# Patient Record
Sex: MALE | Race: WHITE | NOT HISPANIC OR LATINO
[De-identification: names, ages, dates, MRNs, and addresses within clinical notes are randomized per-mention and may not be internally consistent; named-entity substitution may affect disease eponyms.]

---

## 2017-01-10 ENCOUNTER — APPOINTMENT (OUTPATIENT)
Dept: PULMONOLOGY | Facility: CLINIC | Age: 52
End: 2017-01-10

## 2017-01-10 VITALS
WEIGHT: 185 LBS | BODY MASS INDEX: 28.13 KG/M2 | DIASTOLIC BLOOD PRESSURE: 80 MMHG | OXYGEN SATURATION: 100 % | HEART RATE: 77 BPM | SYSTOLIC BLOOD PRESSURE: 110 MMHG

## 2017-05-25 ENCOUNTER — APPOINTMENT (OUTPATIENT)
Dept: PULMONOLOGY | Facility: CLINIC | Age: 52
End: 2017-05-25
Payer: COMMERCIAL

## 2017-05-25 VITALS
RESPIRATION RATE: 17 BRPM | WEIGHT: 184 LBS | DIASTOLIC BLOOD PRESSURE: 80 MMHG | SYSTOLIC BLOOD PRESSURE: 110 MMHG | HEIGHT: 68 IN | OXYGEN SATURATION: 98 % | HEART RATE: 73 BPM | BODY MASS INDEX: 27.89 KG/M2

## 2017-05-25 PROCEDURE — 94010 BREATHING CAPACITY TEST: CPT

## 2017-05-25 PROCEDURE — 99214 OFFICE O/P EST MOD 30 MIN: CPT | Mod: 25

## 2017-11-27 ENCOUNTER — APPOINTMENT (OUTPATIENT)
Dept: PULMONOLOGY | Facility: CLINIC | Age: 52
End: 2017-11-27
Payer: COMMERCIAL

## 2017-11-27 VITALS
BODY MASS INDEX: 27.89 KG/M2 | HEART RATE: 91 BPM | OXYGEN SATURATION: 97 % | HEIGHT: 68 IN | DIASTOLIC BLOOD PRESSURE: 80 MMHG | SYSTOLIC BLOOD PRESSURE: 118 MMHG | WEIGHT: 184 LBS

## 2017-11-27 PROCEDURE — 99214 OFFICE O/P EST MOD 30 MIN: CPT | Mod: 25

## 2017-11-27 PROCEDURE — 94010 BREATHING CAPACITY TEST: CPT

## 2018-03-07 LAB — ERYTHROCYTE [SEDIMENTATION RATE] IN BLOOD BY WESTERGREN METHOD: 5 MM/HR

## 2018-03-13 LAB — HP PNL SER: NORMAL

## 2018-03-21 ENCOUNTER — APPOINTMENT (OUTPATIENT)
Dept: PULMONOLOGY | Facility: CLINIC | Age: 53
End: 2018-03-21

## 2018-03-27 ENCOUNTER — APPOINTMENT (OUTPATIENT)
Dept: PULMONOLOGY | Facility: CLINIC | Age: 53
End: 2018-03-27
Payer: COMMERCIAL

## 2018-03-27 VITALS
HEIGHT: 68 IN | DIASTOLIC BLOOD PRESSURE: 75 MMHG | RESPIRATION RATE: 14 BRPM | HEART RATE: 86 BPM | WEIGHT: 195 LBS | BODY MASS INDEX: 29.55 KG/M2 | OXYGEN SATURATION: 100 % | SYSTOLIC BLOOD PRESSURE: 110 MMHG

## 2018-03-27 DIAGNOSIS — J45.901 ACUTE BRONCHITIS, UNSPECIFIED: ICD-10-CM

## 2018-03-27 DIAGNOSIS — J20.9 ACUTE BRONCHITIS, UNSPECIFIED: ICD-10-CM

## 2018-03-27 PROCEDURE — 99214 OFFICE O/P EST MOD 30 MIN: CPT | Mod: 25

## 2018-03-27 PROCEDURE — 94010 BREATHING CAPACITY TEST: CPT | Mod: 59

## 2018-03-27 PROCEDURE — 94618 PULMONARY STRESS TESTING: CPT

## 2018-03-27 RX ORDER — PREDNISONE 10 MG/1
10 TABLET ORAL
Qty: 1 | Refills: 0 | Status: DISCONTINUED | COMMUNITY
Start: 2017-05-25 | End: 2018-03-27

## 2018-03-29 ENCOUNTER — APPOINTMENT (OUTPATIENT)
Dept: PULMONOLOGY | Facility: CLINIC | Age: 53
End: 2018-03-29

## 2018-06-07 ENCOUNTER — NON-APPOINTMENT (OUTPATIENT)
Age: 53
End: 2018-06-07

## 2018-06-07 ENCOUNTER — APPOINTMENT (OUTPATIENT)
Dept: PULMONOLOGY | Facility: CLINIC | Age: 53
End: 2018-06-07
Payer: COMMERCIAL

## 2018-06-07 VITALS
DIASTOLIC BLOOD PRESSURE: 70 MMHG | RESPIRATION RATE: 17 BRPM | SYSTOLIC BLOOD PRESSURE: 130 MMHG | WEIGHT: 195 LBS | HEART RATE: 92 BPM | BODY MASS INDEX: 29.55 KG/M2 | OXYGEN SATURATION: 100 % | HEIGHT: 68 IN

## 2018-06-07 PROCEDURE — 86580 TB INTRADERMAL TEST: CPT

## 2018-06-07 PROCEDURE — 99214 OFFICE O/P EST MOD 30 MIN: CPT | Mod: 25

## 2018-06-07 PROCEDURE — 94010 BREATHING CAPACITY TEST: CPT

## 2018-06-08 ENCOUNTER — TRANSCRIPTION ENCOUNTER (OUTPATIENT)
Age: 53
End: 2018-06-08

## 2018-08-14 ENCOUNTER — RX RENEWAL (OUTPATIENT)
Age: 53
End: 2018-08-14

## 2018-08-14 ENCOUNTER — MEDICATION RENEWAL (OUTPATIENT)
Age: 53
End: 2018-08-14

## 2018-10-08 ENCOUNTER — APPOINTMENT (OUTPATIENT)
Dept: PULMONOLOGY | Facility: CLINIC | Age: 53
End: 2018-10-08
Payer: COMMERCIAL

## 2018-10-08 VITALS
RESPIRATION RATE: 17 BRPM | BODY MASS INDEX: 29.25 KG/M2 | HEIGHT: 68 IN | OXYGEN SATURATION: 98 % | SYSTOLIC BLOOD PRESSURE: 120 MMHG | DIASTOLIC BLOOD PRESSURE: 80 MMHG | HEART RATE: 75 BPM | WEIGHT: 193 LBS

## 2018-10-08 PROCEDURE — 99214 OFFICE O/P EST MOD 30 MIN: CPT

## 2018-10-08 NOTE — PHYSICAL EXAM

## 2018-10-08 NOTE — ADDENDUM
[FreeTextEntry1] : Documented by Rogelio Herbert acting as a scribe for Dr. Justice Lama on 10/8/18\par \par All medical record entries made by the Scribe were at my, Dr. Justice Lama's, direction and personally dictated by me on 10/8/18. I have reviewed the chart and agree that the record accurately reflects my personal performance of the history, physical exam, assessment and plan. I have also personally directed, reviewed, and agree with the discharge instructions. \par \par \par \par \par

## 2018-10-08 NOTE — PROCEDURE
[FreeTextEntry1] : PFT (10/5- [facility]) spi reveals normal flows; FEV1 was 3.75 L which is 110% of predicted; flattened inspiratory limb. \par \par He had a CT chest scan performed on 9/7/2018, which showed Stable very small calcified and noncalcified pulmonary nodules compared to 6/4/2018 without developing supporting a benign postinflammatory etiology; no developing interstitial thickening/ fibrosis. Resolved right upper lobe infiltrate/ bile PNA. \par \par \par Mild LPR

## 2018-10-08 NOTE — REASON FOR VISIT
[Follow-Up] : a follow-up visit [FreeTextEntry1] : abnormal chest CT, allergic rhinitis, sthma, GERD, insomnia, DORA

## 2018-10-08 NOTE — ASSESSMENT
[FreeTextEntry1] : **********************ALL PRINTED SCRIPTS TO BE FILLED THROUGH [facility] FUND*************************\par \par Mr. Corrales has a history of asthma, allergy, GERD, and abnormal CT. He is s/p WTC exposure.\par \par problem 1: abnormal chest CT \par -? reflecting pneumonia or inflammatory panel- Resolved\par -PPD Quantiferon gold (-)\par -Follow up Chest St in 3 months if change \par -get second opinion radiological read on CT scan\par -ESR and hypersensitivity panel were normal \par -follow up chest CT  in 9/2018\par \par problem 2: asthma (active)\par -continue QVar 80 puffs BID\par -continue to use Symbicort 160 at 2 inhalations BID\par -continue Spiriva at 1 inhalation QD \par -continue to use Singulair 10 mg QHS \par -continue to use Ventolin PRN \par \par -Asthma is  believed to be caused by inherited (genetic) and environmental factor, but its exact cause is unknown. Asthma may be triggered by allergens, lung infections, or irritants in the air. Asthma triggers are different for each person\par -Inhaler technique reviewed as well as oral hygiene techniques reviewed with patient. Avoidance of cold air, extremes of temperature, rescue inhaler should be used before exercise. Order of medication reviewed with patient. Recommended use of a cool mist humidifier in the bedroom.\par \par problem 3: cough; ? Sensory neuropathic cough \par -continue Amitriptyline 10 mg up to TID, prn (DC if able)\par -continue Promethazine DM prn\par Sensory neuropathic cough is an etiology of cough that is often realized once someone has been ruled out for common disease such as: asthma, COPD, eosinophilic bronchitis, bronchiectasis, post nasal drip, and GERD. It sometimes develops following a URI, herpes zoster outbreak in pharynx or thyroid or cervical spine injury. However, many patients have no identifiable antecedent explanation. \par \par problem 4: allergic rhinitis \par -continue to use Xyzal 5 mg QHS\par -continue to use Omnaris 1 sniff/nostril BID\par -continue Olopatadine 1 sniff each nostril BID\par -referred to Dr. Ok Islas\par -Environmental measures for allergies were encouraged including mattress and pillow cover, air purifier, and environmental controls.\par \par problem 5: GERD (FeeSST- c/w LPR)\par -continue to use Dexilant 60 mg before first meal\par -continue to use Zantac 300 mg QHS\par \par -Rule of 2s: avoid eating too much, eating too late, eating too spicy, eating two hours before bed\par -Things to avoid including overeating, spicy foods, tight clothing, eating within three hours of bed, this list is not all inclusive. \par -For treatment of reflux, possible options discussed including diet control, H2 blockers, PPIs, as well as coating motility agents discussed as treatment options. Timing of meals and proximity of last meal to sleep were discussed. If symptoms persist, a formal gastrointestinal evaluation is needed. \par \par problem 6: insomnia\par -continue to use Ambien PRN \par -recommended good sleep hygiene\par -Good sleep hygiene was encouraged including avoiding watching television an hour before bed, keeping caffeine at a low,  avoiding reading, television, or anything, in bed, no drinking any liquids three hours before bedtime, and only getting into bed when tired and ready for sleep.\par \par problem 7: foot cramps\par -continue to use MyoCalm PM\par \par problem 8: pre-op \par -at this point in time there are no absolute pulmonary contraindications to go forward with the planned orthopedic procedure\par -at the time of surgery, he should have optimal pain control, incentive spirometry, DVT and GI prophylaxis. \par \par problem 9: health maintenance\par -received flu shot\par -recommended strep pneumonia vaccines: Prevnar-13 vaccine, followed by Pneumo vaccine 23 on year following\par -recommended early intervention for URIs\par -recommended osteoporosis evaluations\par -recommended early dermatological evaluations\par -recommended after the age of 50 to the age of 70, colonoscopy every 5 years\par -encouraged early intervention\par \par \par F/U in 4 months\par He is encouraged to call with any changes, concerns, or questions. \par \par ***********************ALL PRINTED SCRIPTS TO BE FILLED THROUGH [facility] FUND************************************

## 2018-10-08 NOTE — HISTORY OF PRESENT ILLNESS
[FreeTextEntry1] : Mr Corrales is a 53 year old male coming to the office today for a follow up for abnormal chest CT, allergic rhinitis, asthma, GERD, insomnia, DORA. His chief complaints are coughing and wheezing. \par - He comes in stating he had coughing and wheezing for three weeks.\par - His sleeping has improved slightly due to medication management\par - He is not snoring\par - His heartburn and reflux has improved. \par - He has gained weight, which he believes is due to Amitriptyline. \par - He denies any headaches, nausea, vomiting, fever, chills, sweats, chest pain, chest pressure, palpitations,fatigue, diarrhea, constipation, dysphagia, myalgias, dizziness, leg swelling, leg pain, itchy eyes, itchy ears, or sour taste in the mouth. \par \par

## 2018-11-21 ENCOUNTER — MEDICATION RENEWAL (OUTPATIENT)
Age: 53
End: 2018-11-21

## 2019-02-08 ENCOUNTER — APPOINTMENT (OUTPATIENT)
Dept: PULMONOLOGY | Facility: CLINIC | Age: 54
End: 2019-02-08
Payer: COMMERCIAL

## 2019-02-08 ENCOUNTER — NON-APPOINTMENT (OUTPATIENT)
Age: 54
End: 2019-02-08

## 2019-02-08 VITALS
BODY MASS INDEX: 29.55 KG/M2 | OXYGEN SATURATION: 100 % | DIASTOLIC BLOOD PRESSURE: 78 MMHG | HEART RATE: 73 BPM | WEIGHT: 195 LBS | RESPIRATION RATE: 17 BRPM | SYSTOLIC BLOOD PRESSURE: 120 MMHG | HEIGHT: 68 IN

## 2019-02-08 DIAGNOSIS — Z00.00 ENCOUNTER FOR GENERAL ADULT MEDICAL EXAMINATION W/OUT ABNORMAL FINDINGS: ICD-10-CM

## 2019-02-08 PROCEDURE — 94010 BREATHING CAPACITY TEST: CPT

## 2019-02-08 PROCEDURE — 99214 OFFICE O/P EST MOD 30 MIN: CPT | Mod: 25

## 2019-02-08 PROCEDURE — 95012 NITRIC OXIDE EXP GAS DETER: CPT

## 2019-02-08 NOTE — PROCEDURE
[FreeTextEntry1] : PFT -  spi reveals normal flows; FEV1 was 2.68 L which is 104% of predicted; flattened inspiratory limb. \par \par FENO was 16 ; a normal value being less than 25\par \par Fractional exhaled nitric oxide (FENO) is regarded as a simple, noninvasive method for assessing eosinophilic airway inflammation. Produced by a variety of cells within the lung, nitric oxide (NO) concentrations are generally low in healthy individuals. However, high concentrations of NO appear to be involved in nonspecific host defense mechanisms and chronic inflammatory diseases such as asthma. The American Thoracic Society (ATS) therefore has recommended using FENO to aid in the diagnosis and monitoring of eosinophilic airway inflammation and asthma, and for identifying steroid responsive individuals whose chronic respiratory symptoms may be caused by airway inflammation. \par \par CT Sinus without contrast  done 10.23.2018 revealed

## 2019-02-08 NOTE — REASON FOR VISIT
[Follow-Up] : a follow-up visit [FreeTextEntry1] : abnormal chest CT, allergic rhinitis, asthma, GERD, insomnia, DORA

## 2019-02-08 NOTE — ADDENDUM
[FreeTextEntry1] : Documented by EMILIA SCHNEIDER acting as a scribe for Dr. Justice Lama on 02/08/2019.\par \par All medical record entries made by the Scribe were at my, Dr. Justice Lama's, direction and personally dictated by me on 02/08/2019. I have reviewed the chart and agree that the record accurately reflects my personal performance of the history, physical exam, assessment and plan. I have also personally directed, reviewed, and agree with the discharge instructions. \par \par \par \par

## 2019-02-08 NOTE — HISTORY OF PRESENT ILLNESS
[FreeTextEntry1] : Mr Corrales is a 53 year old male coming to the office today for a follow up for abnormal chest CT, allergic rhinitis, asthma, GERD, insomnia, DORA. His chief complaints are coughing and wheezing. \par \par - He states that he is feeling okay.\par - He states that his daughter had an MVA and is now is rehab, which has been a source of stress.\par - He states that he has been short of breath intermittently and has been using inhaler.\par - He states that he is now working part time.\par - He states that he had a recent weight gain, which he believes is due to stress and lack of time to exercise. \par - He states that his sense of smell is significantly decreased.\par - He states that he sleeps only 2-3 hours at a time, which is frequently interrupted.\par - He states that he is taking Valium and Rozerem for sleep. \par - He states that he has occasional wheezing, which he feels is exercise or stress induced. \par - He states that he has been coughing more lately and producing a yellow/green mucus. \par - He denies headaches, nausea, vomiting, fever, chills, sweats, chest pain, chest pressure, diarrhea, constipation, dysphagia, dizziness, itchy eyes, itchy ears, sour taste in the mouth, heartburn, reflux, palpitations, leg swelling, leg pain

## 2019-02-08 NOTE — ASSESSMENT
[FreeTextEntry1] : **********************ALL PRINTED SCRIPTS TO BE FILLED THROUGH [facility] FUND*************************\par \par Mr. Corrales has a history of asthma, allergy, GERD, and abnormal CT. He is s/p WTC exposure.\par \par problem 1: abnormal chest CT - improved \par -? reflecting pneumonia or inflammatory panel- Resolved\par -PPD Quantiferon gold (-)\par -Follow up Chest St in 3 months if change \par -get second opinion radiological read on CT scan\par -ESR and hypersensitivity panel were normal \par -follow up chest CT  in 10/18\par \par problem 2: asthma\par -continue QVar 80 puffs BID\par -continue to use Symbicort 160 at 2 inhalations BID\par -continue Spiriva at 1 inhalation QD \par -continue to use Singulair 10 mg QHS \par -continue to use Ventolin PRN \par \par -Asthma is  believed to be caused by inherited (genetic) and environmental factor, but its exact cause is unknown. Asthma may be triggered by allergens, lung infections, or irritants in the air. Asthma triggers are different for each person\par -Inhaler technique reviewed as well as oral hygiene techniques reviewed with patient. Avoidance of cold air, extremes of temperature, rescue inhaler should be used before exercise. Order of medication reviewed with patient. Recommended use of a cool mist humidifier in the bedroom.\par \par problem 3: cough; ? Sensory neuropathic cough \par -continue Amitriptyline 10 mg up to TID, prn (DC if able)\par -continue Promethazine DM prn\par Sensory neuropathic cough is an etiology of cough that is often realized once someone has been ruled out for common disease such as: asthma, COPD, eosinophilic bronchitis, bronchiectasis, post nasal drip, and GERD. It sometimes develops following a URI, herpes zoster outbreak in pharynx or thyroid or cervical spine injury. However, many patients have no identifiable antecedent explanation. \par \par problem 4: allergic rhinitis \par -continue to use Xyzal 5 mg QHS\par -continue to use Omnaris 1 sniff/nostril BID\par -continue Olopatadine 1 sniff each nostril BID (.6)\par -follow up with Dr. Ok Islas\par -Environmental measures for allergies were encouraged including mattress and pillow cover, air purifier, and environmental controls.\par \par problem 5: GERD (FeeSST- c/w LPR)\par -continue to use Dexilant 60 mg before first meal\par -continue to use Zantac 300 mg QHS\par \par -Rule of 2s: avoid eating too much, eating too late, eating too spicy, eating two hours before bed\par -Things to avoid including overeating, spicy foods, tight clothing, eating within three hours of bed, this list is not all inclusive. \par -For treatment of reflux, possible options discussed including diet control, H2 blockers, PPIs, as well as coating motility agents discussed as treatment options. Timing of meals and proximity of last meal to sleep were discussed. If symptoms persist, a formal gastrointestinal evaluation is needed. \par \par problem 6: insomnia\par -continue to use Ambien PRN \par -recommended good sleep hygiene\par -Good sleep hygiene was encouraged including avoiding watching television an hour before bed, keeping caffeine at a low,  avoiding reading, television, or anything, in bed, no drinking any liquids three hours before bedtime, and only getting into bed when tired and ready for sleep.\par \par problem 7: foot cramps (resolved)\par -continue to use MyoCalm PM\par \par problem 8: health maintenance\par -received flu shot in 2018\par -recommended strep pneumonia vaccines: Prevnar-13 vaccine, followed by Pneumo vaccine 23 on year following\par -recommended early intervention for URIs\par -recommended osteoporosis evaluations\par -recommended early dermatological evaluations\par -recommended after the age of 50 to the age of 70, colonoscopy every 5 years\par -encouraged early intervention\par \par \par F/U in 4 months\par He is encouraged to call with any changes, concerns, or questions. \par \par ***********************ALL PRINTED SCRIPTS TO BE FILLED THROUGH [facility] FUND************************************

## 2019-02-08 NOTE — PHYSICAL EXAM

## 2019-04-19 ENCOUNTER — TRANSCRIPTION ENCOUNTER (OUTPATIENT)
Age: 54
End: 2019-04-19

## 2019-06-06 ENCOUNTER — APPOINTMENT (OUTPATIENT)
Dept: RADIOLOGY | Facility: CLINIC | Age: 54
End: 2019-06-06
Payer: COMMERCIAL

## 2019-06-06 ENCOUNTER — OUTPATIENT (OUTPATIENT)
Dept: OUTPATIENT SERVICES | Facility: HOSPITAL | Age: 54
LOS: 1 days | End: 2019-06-06
Payer: COMMERCIAL

## 2019-06-06 ENCOUNTER — APPOINTMENT (OUTPATIENT)
Dept: MRI IMAGING | Facility: CLINIC | Age: 54
End: 2019-06-06
Payer: COMMERCIAL

## 2019-06-06 DIAGNOSIS — R29.3 ABNORMAL POSTURE: ICD-10-CM

## 2019-06-06 DIAGNOSIS — M47.816 SPONDYLOSIS WITHOUT MYELOPATHY OR RADICULOPATHY, LUMBAR REGION: ICD-10-CM

## 2019-06-06 PROCEDURE — 72082 X-RAY EXAM ENTIRE SPI 2/3 VW: CPT | Mod: 26

## 2019-06-06 PROCEDURE — 72082 X-RAY EXAM ENTIRE SPI 2/3 VW: CPT

## 2019-06-06 PROCEDURE — 72148 MRI LUMBAR SPINE W/O DYE: CPT | Mod: 26

## 2019-06-06 PROCEDURE — 72148 MRI LUMBAR SPINE W/O DYE: CPT

## 2019-06-18 ENCOUNTER — CLINICAL ADVICE (OUTPATIENT)
Age: 54
End: 2019-06-18

## 2019-06-18 ENCOUNTER — NON-APPOINTMENT (OUTPATIENT)
Age: 54
End: 2019-06-18

## 2019-06-18 ENCOUNTER — APPOINTMENT (OUTPATIENT)
Dept: PULMONOLOGY | Facility: CLINIC | Age: 54
End: 2019-06-18
Payer: COMMERCIAL

## 2019-06-18 VITALS
WEIGHT: 180 LBS | SYSTOLIC BLOOD PRESSURE: 90 MMHG | HEIGHT: 68 IN | RESPIRATION RATE: 17 BRPM | DIASTOLIC BLOOD PRESSURE: 60 MMHG | BODY MASS INDEX: 27.28 KG/M2 | OXYGEN SATURATION: 95 % | HEART RATE: 90 BPM

## 2019-06-18 PROCEDURE — 95012 NITRIC OXIDE EXP GAS DETER: CPT

## 2019-06-18 PROCEDURE — 99214 OFFICE O/P EST MOD 30 MIN: CPT | Mod: 25

## 2019-06-18 PROCEDURE — 94010 BREATHING CAPACITY TEST: CPT

## 2019-07-04 ENCOUNTER — FORM ENCOUNTER (OUTPATIENT)
Age: 54
End: 2019-07-04

## 2019-07-05 ENCOUNTER — APPOINTMENT (OUTPATIENT)
Dept: CT IMAGING | Facility: CLINIC | Age: 54
End: 2019-07-05

## 2019-07-05 ENCOUNTER — OUTPATIENT (OUTPATIENT)
Dept: OUTPATIENT SERVICES | Facility: HOSPITAL | Age: 54
LOS: 1 days | End: 2019-07-05
Payer: COMMERCIAL

## 2019-07-05 DIAGNOSIS — R93.89 ABNORMAL FINDINGS ON DIAGNOSTIC IMAGING OF OTHER SPECIFIED BODY STRUCTURES: ICD-10-CM

## 2019-07-05 PROCEDURE — 72131 CT LUMBAR SPINE W/O DYE: CPT | Mod: 26

## 2019-07-05 PROCEDURE — 72131 CT LUMBAR SPINE W/O DYE: CPT

## 2019-07-08 ENCOUNTER — OUTPATIENT (OUTPATIENT)
Dept: OUTPATIENT SERVICES | Facility: HOSPITAL | Age: 54
LOS: 1 days | End: 2019-07-08
Payer: COMMERCIAL

## 2019-07-08 VITALS
TEMPERATURE: 98 F | HEART RATE: 82 BPM | OXYGEN SATURATION: 99 % | WEIGHT: 179.9 LBS | HEIGHT: 68 IN | SYSTOLIC BLOOD PRESSURE: 106 MMHG | DIASTOLIC BLOOD PRESSURE: 73 MMHG | RESPIRATION RATE: 17 BRPM

## 2019-07-08 DIAGNOSIS — M43.10 SPONDYLOLISTHESIS, SITE UNSPECIFIED: ICD-10-CM

## 2019-07-08 DIAGNOSIS — Z98.84 BARIATRIC SURGERY STATUS: Chronic | ICD-10-CM

## 2019-07-08 DIAGNOSIS — Z98.890 OTHER SPECIFIED POSTPROCEDURAL STATES: Chronic | ICD-10-CM

## 2019-07-08 DIAGNOSIS — J45.909 UNSPECIFIED ASTHMA, UNCOMPLICATED: ICD-10-CM

## 2019-07-08 DIAGNOSIS — Z90.49 ACQUIRED ABSENCE OF OTHER SPECIFIED PARTS OF DIGESTIVE TRACT: Chronic | ICD-10-CM

## 2019-07-08 DIAGNOSIS — Z01.818 ENCOUNTER FOR OTHER PREPROCEDURAL EXAMINATION: ICD-10-CM

## 2019-07-08 DIAGNOSIS — Z29.9 ENCOUNTER FOR PROPHYLACTIC MEASURES, UNSPECIFIED: ICD-10-CM

## 2019-07-08 DIAGNOSIS — I10 ESSENTIAL (PRIMARY) HYPERTENSION: ICD-10-CM

## 2019-07-08 DIAGNOSIS — M43.16 SPONDYLOLISTHESIS, LUMBAR REGION: ICD-10-CM

## 2019-07-08 DIAGNOSIS — Z98.84 BARIATRIC SURGERY STATUS: ICD-10-CM

## 2019-07-08 LAB
ANION GAP SERPL CALC-SCNC: 10 MMOL/L — SIGNIFICANT CHANGE UP (ref 5–17)
BLD GP AB SCN SERPL QL: NEGATIVE — SIGNIFICANT CHANGE UP
BUN SERPL-MCNC: 19 MG/DL — SIGNIFICANT CHANGE UP (ref 7–23)
CALCIUM SERPL-MCNC: 9.2 MG/DL — SIGNIFICANT CHANGE UP (ref 8.4–10.5)
CHLORIDE SERPL-SCNC: 104 MMOL/L — SIGNIFICANT CHANGE UP (ref 96–108)
CO2 SERPL-SCNC: 26 MMOL/L — SIGNIFICANT CHANGE UP (ref 22–31)
CREAT SERPL-MCNC: 1.05 MG/DL — SIGNIFICANT CHANGE UP (ref 0.5–1.3)
GLUCOSE SERPL-MCNC: 96 MG/DL — SIGNIFICANT CHANGE UP (ref 70–99)
HCT VFR BLD CALC: 44.4 % — SIGNIFICANT CHANGE UP (ref 39–50)
HGB BLD-MCNC: 14.3 G/DL — SIGNIFICANT CHANGE UP (ref 13–17)
MCHC RBC-ENTMCNC: 27.5 PG — SIGNIFICANT CHANGE UP (ref 27–34)
MCHC RBC-ENTMCNC: 32.2 GM/DL — SIGNIFICANT CHANGE UP (ref 32–36)
MCV RBC AUTO: 85.4 FL — SIGNIFICANT CHANGE UP (ref 80–100)
MRSA PCR RESULT.: SIGNIFICANT CHANGE UP
PLATELET # BLD AUTO: 227 K/UL — SIGNIFICANT CHANGE UP (ref 150–400)
POTASSIUM SERPL-MCNC: 4.2 MMOL/L — SIGNIFICANT CHANGE UP (ref 3.5–5.3)
POTASSIUM SERPL-SCNC: 4.2 MMOL/L — SIGNIFICANT CHANGE UP (ref 3.5–5.3)
RBC # BLD: 5.2 M/UL — SIGNIFICANT CHANGE UP (ref 4.2–5.8)
RBC # FLD: 15.7 % — HIGH (ref 10.3–14.5)
RH IG SCN BLD-IMP: POSITIVE — SIGNIFICANT CHANGE UP
S AUREUS DNA NOSE QL NAA+PROBE: SIGNIFICANT CHANGE UP
SODIUM SERPL-SCNC: 140 MMOL/L — SIGNIFICANT CHANGE UP (ref 135–145)
WBC # BLD: 8.73 K/UL — SIGNIFICANT CHANGE UP (ref 3.8–10.5)
WBC # FLD AUTO: 8.73 K/UL — SIGNIFICANT CHANGE UP (ref 3.8–10.5)

## 2019-07-08 PROCEDURE — 86901 BLOOD TYPING SEROLOGIC RH(D): CPT

## 2019-07-08 PROCEDURE — G0463: CPT

## 2019-07-08 PROCEDURE — 85027 COMPLETE CBC AUTOMATED: CPT

## 2019-07-08 PROCEDURE — 80048 BASIC METABOLIC PNL TOTAL CA: CPT

## 2019-07-08 PROCEDURE — 87641 MR-STAPH DNA AMP PROBE: CPT

## 2019-07-08 PROCEDURE — 86900 BLOOD TYPING SEROLOGIC ABO: CPT

## 2019-07-08 PROCEDURE — 86850 RBC ANTIBODY SCREEN: CPT

## 2019-07-08 PROCEDURE — 87640 STAPH A DNA AMP PROBE: CPT

## 2019-07-08 NOTE — H&P PST ADULT - ASSESSMENT
CAPRINI SCORE [CLOT updated 18]    AGE RELATED RISK FACTORS                                                       MOBILITY RELATED FACTORS  [1 ] Age 41-60 years                                            (1 Point)                    [ ] Bed rest                                                        (1 Point)  [ ] Age: 61-74 years                                           (2 Points)                  [ ] Plaster cast                                                   (2 Points)  [ ] Age= 75 years                                              (3 Points)                    [ ] Bed bound for more than 72 hours                 (2 Points)    DISEASE RELATED RISK FACTORS                                               GENDER SPECIFIC FACTORS  [ ] Edema in the lower extremities                       (1 Point)              [ ] Pregnancy                                                     (1 Point)  [ ] Varicose veins                                               (1 Point)                     [ ] Post-partum < 6 weeks                                   (1 Point)             [1 ] BMI > 25 Kg/m2                                            (1 Point)                     [ ] Hormonal therapy  or oral contraception          (1 Point)                 [ ] Sepsis (in the previous month)                        (1 Point)               [ ] History of pregnancy complications                 (1 point)  [ ] Pneumonia or serious lung disease                                               [ ] Unexplained or recurrent                     (1 Point)           (in the previous month)                               (1 Point)  [ ] Abnormal pulmonary function test                     (1 Point)                 SURGERY RELATED RISK FACTORS  [ ] Acute myocardial infarction                              (1 Point)               [ ]  Section                                             (1 Point)  [ ] Congestive heart failure (in the previous month)  (1 Point)      [ ] Minor surgery                                                  (1 Point)   [ ] Inflammatory bowel disease                             (1 Point)               [ ] Arthroscopic surgery                                        (2 Points)  [ ] Central venous access                                      (2 Points)                [2 ] General surgery lasting more than 45 minutes (2 points)  [ ] Present or previous malignancy                     (2 Points)                [ ] Elective arthroplasty                                         (5 points)    [ ] Stroke (in the previous month)                          (5 Points)                                                                                                                                                           HEMATOLOGY RELATED FACTORS                                                 TRAUMA RELATED RISK FACTORS  [ ] Prior episodes of VTE                                     (3 Points)                [ ] Fracture of the hip, pelvis, or leg                       (5 Points)  [ ] Positive family history for VTE                         (3 Points)             [ ] Acute spinal cord injury (in the previous month)  (5 Points)  [ ] Prothrombin 19245 A                                     (3 Points)               [ ] Paralysis  (less than 1 month)                             (5 Points)  [ ] Factor V Leiden                                             (3 Points)                  [ ] Multiple Trauma within 1 month                        (5 Points)  [ ] Lupus anticoagulants                                     (3 Points)                                                           [ ] Anticardiolipin antibodies                               (3 Points)                                                       [ ] High homocysteine in the blood                      (3 Points)                                             [ ] Other congenital or acquired thrombophilia      (3 Points)                                                [ ] Heparin induced thrombocytopenia                  (3 Points)                                     Total Score [ 4 ]

## 2019-07-08 NOTE — H&P PST ADULT - ACTIVITY
able to walk up stair, moderate house chores, works as an EMT able to walk up stair, moderate house chores, works as an EMT, limited physical exam due to back pain

## 2019-07-08 NOTE — H&P PST ADULT - NSICDXFAMILYHX_GEN_ALL_CORE_FT
FAMILY HISTORY:  Family history of breast cancer, mother  age 82  FHx: Alzheimer's disease, father  age 84

## 2019-07-08 NOTE — H&P PST ADULT - GUM GEN PE MLT EXAM PC
Patient failed Xarelto  Coumadin 7.5 mg po qhs and full dose Lovenox until INR therapeutic  Unremarkable CT Abd/pelvis  Patient will need to continue follow up with outpatient Heme/Oncologist specialist and Primary Care for constant follow up of INR levels.  INR in AM: 1.42 GOAL : 2-3  Heme/Onc Dr Sheppard not examined

## 2019-07-08 NOTE — H&P PST ADULT - MALLAMPATI CLASS
normal... Well appearing, well nourished, awake, alert, oriented to person, place, time/situation and in no apparent distress. Class II - visualization of the soft palate, fauces, and uvula

## 2019-07-08 NOTE — H&P PST ADULT - HISTORY OF PRESENT ILLNESS
53 year old male with spondylolisthesis lumbar region, c/o back pain with peripheral neuropathy, states have had epidural injection with minimal relief, is now scheduled for L3 laminectomy/ L3-4 combined posterolateral posterior lumbar interbody fusion/ L3-4 pedicle screw fixation/ iliac crest bone graft. Patient medical history also includes HTN, GERD, obesity s/p gastric banding (2008), PTSD, Asthma, lung nodules.

## 2019-07-08 NOTE — H&P PST ADULT - NSICDXPASTMEDICALHX_GEN_ALL_CORE_FT
PAST MEDICAL HISTORY:  Asthma no previous intubation for asthma    H/O neuropathy upper and lower extremities    Lung nodule bilateral    PTSD (post-traumatic stress disorder) [facility] [email or web address] [facility] PAST MEDICAL HISTORY:  Asthma no previous intubation for asthma    H/O neuropathy upper and lower extremities    History of obesity s/p gastric banding    HTN (hypertension)     Lung nodule bilateral    PTSD (post-traumatic stress disorder) [facility] [employer] [facility]

## 2019-07-08 NOTE — H&P PST ADULT - NSICDXPROBLEM_GEN_ALL_CORE_FT
PROBLEM DIAGNOSES  Problem: Spondylolisthesis  Assessment and Plan: scheduled for L3 laminectomy/ L3-4 combined posterior-lateral posterior lumbar interbody fusion/ L3-4 pedicle screw fixation/ iliac crest bone graft  preop instruction given including surgical wash as per protocol discussed     Problem: LAP-BAND surgery status  Assessment and Plan: patient instructed to have lap band deflated prior to surgery, verbalized understanding     Problem: Asthma  Assessment and Plan: will continue inhalers greta-op  have been evaluated by his pulmonologist for upcoming surgery, report to obtain     Problem: HTN (hypertension)  Assessment and Plan: will continue antihypertensive medication greta-op    Problem: Need for prophylactic measure  Assessment and Plan: The Caprini score indicates this patient is at risk for a VTE event (score 3-5).  Most surgical patients in this group would benefit from pharmacologic prophylaxis.  The surgical team will determine the balance between VTE risk and bleeding risk

## 2019-07-08 NOTE — H&P PST ADULT - NSICDXPASTSURGICALHX_GEN_ALL_CORE_FT
PAST SURGICAL HISTORY:  Gastric banding status 2008    H/O carpal tunnel repair bilateral    History of cholecystectomy     History of colonoscopy 5 years ago    History of gastric surgery 1/2002    S/P LASIK surgery

## 2019-07-10 NOTE — PROCEDURE
[FreeTextEntry1] : PFT - spi reveals normal flows; FEV1 is 4.25 which is 120% of predicted, normal flow volume loop \par \par Chest CT (october 23, 2018) reveals paranasal sinuses are clear b/l. ostiomeatal complexes clear of mucosal disease. b/l Ansley cells are identified compromising the b/l infundibula more prominent on the left side. slight deviation of the bony nasal septum to the left. the previously seen sigmoid angulation is not seen in the current study. the anterior cartilaginous nasal septum is deviated to the right narrowing the right nares. focal defect or perforation seen in the anterior cartilaginous nasal septum measuring approx. 6.1 mm in AP dimension and 4.4 mm in craniocaudal dimension. this was not seen in the prior study.\par \par FENO was 10; a normal value being less than 25\par \par Fractional exhaled nitric oxide (FENO) is regarded as a simple, noninvasive method for assessing eosinophilic airway inflammation. Produced by a variety of cells within the lung, nitric oxide (NO) concentrations are generally low in healthy individuals. However, high concentrations of NO appear to be involved in nonspecific host defense mechanisms and chronic inflammatory diseases such as asthma. The American Thoracic Society (ATS) therefore has recommended using FENO to aid in the diagnosis and monitoring of eosinophilic airway inflammation and asthma, and for identifying steroid responsive individuals whose chronic respiratory symptoms may be caused by airway inflammation.

## 2019-07-10 NOTE — PHYSICAL EXAM
[General Appearance - Well Developed] : well developed [Well Groomed] : well groomed [Normal Appearance] : normal appearance [General Appearance - Well Nourished] : well nourished [No Deformities] : no deformities [Eyelids - No Xanthelasma] : the eyelids demonstrated no xanthelasmas [Normal Conjunctiva] : the conjunctiva exhibited no abnormalities [General Appearance - In No Acute Distress] : no acute distress [Normal Oropharynx] : normal oropharynx [I] : I [Neck Appearance] : the appearance of the neck was normal [Jugular Venous Distention Increased] : there was no jugular-venous distention [Neck Cervical Mass (___cm)] : no neck mass was observed [Thyroid Nodule] : there were no palpable thyroid nodules [Heart Rate And Rhythm] : heart rate and rhythm were normal [Thyroid Diffuse Enlargement] : the thyroid was not enlarged [Heart Sounds] : normal S1 and S2 [Murmurs] : no murmurs present [Auscultation Breath Sounds / Voice Sounds] : lungs were clear to auscultation bilaterally [Exaggerated Use Of Accessory Muscles For Inspiration] : no accessory muscle use [Respiration, Rhythm And Depth] : normal respiratory rhythm and effort [Abdomen Tenderness] : non-tender [Abdomen Soft] : soft [Abnormal Walk] : normal gait [Abdomen Mass (___ Cm)] : no abdominal mass palpated [Nail Clubbing] : no clubbing of the fingernails [Cyanosis, Localized] : no localized cyanosis [Gait - Sufficient For Exercise Testing] : the gait was sufficient for exercise testing [Skin Color & Pigmentation] : normal skin color and pigmentation [Petechial Hemorrhages (___cm)] : no petechial hemorrhages [No Venous Stasis] : no venous stasis [Skin Lesions] : no skin lesions [] : no rash [Deep Tendon Reflexes (DTR)] : deep tendon reflexes were 2+ and symmetric [No Skin Ulcers] : no skin ulcer [No Xanthoma] : no  xanthoma was observed [Sensation] : the sensory exam was normal to light touch and pinprick [No Focal Deficits] : no focal deficits [Affect] : the affect was normal [Impaired Insight] : insight and judgment were intact [Oriented To Time, Place, And Person] : oriented to person, place, and time [FreeTextEntry1] : I:E ratio 1:3; clear

## 2019-07-10 NOTE — REASON FOR VISIT
[Follow-Up] : a follow-up visit [FreeTextEntry1] : allergic rhinitis, asthma, abnormal chest CT, and GERD

## 2019-07-10 NOTE — HISTORY OF PRESENT ILLNESS
[FreeTextEntry1] : Mr. Corrales is 53 year old male with a history of allergic rhinitis, asthma, abnormal chest CT, and GERD presenting to the office today for a follow up visit and preop clearance. His chief complaint is back pain.\par -he will be going for an infusion on July 27 ( lumbar laminectomy and fusion)\par -he states that he has had a significant amount of neck and lower back pain.\par -he reports that he has been bringing up a lot of mucus, which he believes in from his sinuses. he has been sneezing more frequently and has been considering a sinuplasty with Dr. Wiggins\par -he notes that his reflux has been under control\par -he has been sleeping poorly lately\par -his sense of smell has been poor \par -he has been actively trying to lose weight \par -he has noticed he has needed to use his inhalers more frequently recently, although he has noticed his voice has been hoarse \par -he notes that he has  been using all of his inhalers regularly, as well as Xyzal and Singulair. \par -he denies any headaches, nausea, vomiting, fever, chills, sweats, chest pain, chest pressure, diarrhea, constipation, dysphagia, dizziness, leg swelling, leg pain, itchy eyes, itchy ears, heartburn, reflux, or sour taste in the mouth.

## 2019-07-10 NOTE — ADDENDUM
[FreeTextEntry1] : All medical record entries made by evangelista Sigala were at Dr. Justice Lama's, direction and personally dictated by me on 06/18/2019. I have reviewed the chart and agree that the record accurately reflects my personal performance of the history, physical exam, assessment and plan. I have also personally directed, reviewed, and agree with the discharge instructions.

## 2019-07-21 ENCOUNTER — TRANSCRIPTION ENCOUNTER (OUTPATIENT)
Age: 54
End: 2019-07-21

## 2019-07-22 ENCOUNTER — INPATIENT (INPATIENT)
Facility: HOSPITAL | Age: 54
LOS: 3 days | Discharge: HOME | DRG: 454 | End: 2019-07-26
Attending: NEUROLOGICAL SURGERY | Admitting: NEUROLOGICAL SURGERY
Payer: COMMERCIAL

## 2019-07-22 VITALS
HEIGHT: 68 IN | TEMPERATURE: 98 F | HEART RATE: 72 BPM | WEIGHT: 179.9 LBS | RESPIRATION RATE: 18 BRPM | OXYGEN SATURATION: 100 % | SYSTOLIC BLOOD PRESSURE: 87 MMHG | DIASTOLIC BLOOD PRESSURE: 62 MMHG

## 2019-07-22 DIAGNOSIS — Z98.84 BARIATRIC SURGERY STATUS: Chronic | ICD-10-CM

## 2019-07-22 DIAGNOSIS — Z98.890 OTHER SPECIFIED POSTPROCEDURAL STATES: Chronic | ICD-10-CM

## 2019-07-22 DIAGNOSIS — Z90.49 ACQUIRED ABSENCE OF OTHER SPECIFIED PARTS OF DIGESTIVE TRACT: Chronic | ICD-10-CM

## 2019-07-22 DIAGNOSIS — M43.16 SPONDYLOLISTHESIS, LUMBAR REGION: ICD-10-CM

## 2019-07-22 LAB
ANION GAP SERPL CALC-SCNC: 15 MMOL/L — SIGNIFICANT CHANGE UP (ref 5–17)
BASOPHILS # BLD AUTO: 0 K/UL — SIGNIFICANT CHANGE UP (ref 0–0.2)
BASOPHILS NFR BLD AUTO: 0.2 % — SIGNIFICANT CHANGE UP (ref 0–2)
BUN SERPL-MCNC: 16 MG/DL — SIGNIFICANT CHANGE UP (ref 7–23)
CALCIUM SERPL-MCNC: 7.8 MG/DL — LOW (ref 8.4–10.5)
CHLORIDE SERPL-SCNC: 103 MMOL/L — SIGNIFICANT CHANGE UP (ref 96–108)
CO2 SERPL-SCNC: 21 MMOL/L — LOW (ref 22–31)
CREAT SERPL-MCNC: 0.92 MG/DL — SIGNIFICANT CHANGE UP (ref 0.5–1.3)
EOSINOPHIL # BLD AUTO: 0 K/UL — SIGNIFICANT CHANGE UP (ref 0–0.5)
EOSINOPHIL NFR BLD AUTO: 0.3 % — SIGNIFICANT CHANGE UP (ref 0–6)
GLUCOSE SERPL-MCNC: 143 MG/DL — HIGH (ref 70–99)
HCT VFR BLD CALC: 37.9 % — LOW (ref 39–50)
HGB BLD-MCNC: 13.3 G/DL — SIGNIFICANT CHANGE UP (ref 13–17)
LYMPHOCYTES # BLD AUTO: 0.7 K/UL — LOW (ref 1–3.3)
LYMPHOCYTES # BLD AUTO: 5.9 % — LOW (ref 13–44)
MCHC RBC-ENTMCNC: 29.7 PG — SIGNIFICANT CHANGE UP (ref 27–34)
MCHC RBC-ENTMCNC: 35 GM/DL — SIGNIFICANT CHANGE UP (ref 32–36)
MCV RBC AUTO: 84.9 FL — SIGNIFICANT CHANGE UP (ref 80–100)
MONOCYTES # BLD AUTO: 0.2 K/UL — SIGNIFICANT CHANGE UP (ref 0–0.9)
MONOCYTES NFR BLD AUTO: 1.4 % — LOW (ref 2–14)
NEUTROPHILS # BLD AUTO: 10.5 K/UL — HIGH (ref 1.8–7.4)
NEUTROPHILS NFR BLD AUTO: 92.2 % — HIGH (ref 43–77)
PLATELET # BLD AUTO: 226 K/UL — SIGNIFICANT CHANGE UP (ref 150–400)
POTASSIUM SERPL-MCNC: 4 MMOL/L — SIGNIFICANT CHANGE UP (ref 3.5–5.3)
POTASSIUM SERPL-SCNC: 4 MMOL/L — SIGNIFICANT CHANGE UP (ref 3.5–5.3)
RBC # BLD: 4.47 M/UL — SIGNIFICANT CHANGE UP (ref 4.2–5.8)
RBC # FLD: 14.4 % — SIGNIFICANT CHANGE UP (ref 10.3–14.5)
RH IG SCN BLD-IMP: POSITIVE — SIGNIFICANT CHANGE UP
SODIUM SERPL-SCNC: 139 MMOL/L — SIGNIFICANT CHANGE UP (ref 135–145)
WBC # BLD: 11.4 K/UL — HIGH (ref 3.8–10.5)
WBC # FLD AUTO: 11.4 K/UL — HIGH (ref 3.8–10.5)

## 2019-07-22 RX ORDER — ONDANSETRON 8 MG/1
4 TABLET, FILM COATED ORAL EVERY 4 HOURS
Refills: 0 | Status: DISCONTINUED | OUTPATIENT
Start: 2019-07-22 | End: 2019-07-26

## 2019-07-22 RX ORDER — LORATADINE 10 MG/1
10 TABLET ORAL DAILY
Refills: 0 | Status: DISCONTINUED | OUTPATIENT
Start: 2019-07-22 | End: 2019-07-25

## 2019-07-22 RX ORDER — ACETAMINOPHEN 500 MG
650 TABLET ORAL EVERY 6 HOURS
Refills: 0 | Status: DISCONTINUED | OUTPATIENT
Start: 2019-07-22 | End: 2019-07-26

## 2019-07-22 RX ORDER — METHOCARBAMOL 500 MG/1
1000 TABLET, FILM COATED ORAL
Refills: 0 | Status: DISCONTINUED | OUTPATIENT
Start: 2019-07-22 | End: 2019-07-26

## 2019-07-22 RX ORDER — NALOXONE HYDROCHLORIDE 4 MG/.1ML
0.1 SPRAY NASAL
Refills: 0 | Status: DISCONTINUED | OUTPATIENT
Start: 2019-07-22 | End: 2019-07-24

## 2019-07-22 RX ORDER — MEPERIDINE HYDROCHLORIDE 50 MG/ML
12.5 INJECTION INTRAMUSCULAR; INTRAVENOUS; SUBCUTANEOUS
Refills: 0 | Status: DISCONTINUED | OUTPATIENT
Start: 2019-07-22 | End: 2019-07-22

## 2019-07-22 RX ORDER — CEFAZOLIN SODIUM 1 G
2000 VIAL (EA) INJECTION EVERY 8 HOURS
Refills: 0 | Status: COMPLETED | OUTPATIENT
Start: 2019-07-22 | End: 2019-07-23

## 2019-07-22 RX ORDER — FAMOTIDINE 10 MG/ML
20 INJECTION INTRAVENOUS
Refills: 0 | Status: DISCONTINUED | OUTPATIENT
Start: 2019-07-22 | End: 2019-07-26

## 2019-07-22 RX ORDER — DOCUSATE SODIUM 100 MG
100 CAPSULE ORAL THREE TIMES A DAY
Refills: 0 | Status: DISCONTINUED | OUTPATIENT
Start: 2019-07-22 | End: 2019-07-26

## 2019-07-22 RX ORDER — CEFAZOLIN SODIUM 1 G
2000 VIAL (EA) INJECTION ONCE
Refills: 0 | Status: COMPLETED | OUTPATIENT
Start: 2019-07-22 | End: 2019-07-22

## 2019-07-22 RX ORDER — LOSARTAN POTASSIUM 100 MG/1
50 TABLET, FILM COATED ORAL DAILY
Refills: 0 | Status: DISCONTINUED | OUTPATIENT
Start: 2019-07-22 | End: 2019-07-26

## 2019-07-22 RX ORDER — HYDROMORPHONE HYDROCHLORIDE 2 MG/ML
0.5 INJECTION INTRAMUSCULAR; INTRAVENOUS; SUBCUTANEOUS
Refills: 0 | Status: DISCONTINUED | OUTPATIENT
Start: 2019-07-22 | End: 2019-07-23

## 2019-07-22 RX ORDER — PANTOPRAZOLE SODIUM 20 MG/1
40 TABLET, DELAYED RELEASE ORAL
Refills: 0 | Status: DISCONTINUED | OUTPATIENT
Start: 2019-07-22 | End: 2019-07-25

## 2019-07-22 RX ORDER — DIAZEPAM 5 MG
10 TABLET ORAL
Refills: 0 | Status: DISCONTINUED | OUTPATIENT
Start: 2019-07-22 | End: 2019-07-26

## 2019-07-22 RX ORDER — FLUTICASONE PROPIONATE 50 MCG
2 SPRAY, SUSPENSION NASAL DAILY
Refills: 0 | Status: DISCONTINUED | OUTPATIENT
Start: 2019-07-22 | End: 2019-07-26

## 2019-07-22 RX ORDER — ENOXAPARIN SODIUM 100 MG/ML
40 INJECTION SUBCUTANEOUS EVERY 24 HOURS
Refills: 0 | Status: DISCONTINUED | OUTPATIENT
Start: 2019-07-23 | End: 2019-07-26

## 2019-07-22 RX ORDER — BUDESONIDE AND FORMOTEROL FUMARATE DIHYDRATE 160; 4.5 UG/1; UG/1
2 AEROSOL RESPIRATORY (INHALATION)
Refills: 0 | Status: DISCONTINUED | OUTPATIENT
Start: 2019-07-22 | End: 2019-07-26

## 2019-07-22 RX ORDER — DEXTROSE MONOHYDRATE, SODIUM CHLORIDE, AND POTASSIUM CHLORIDE 50; .745; 4.5 G/1000ML; G/1000ML; G/1000ML
1000 INJECTION, SOLUTION INTRAVENOUS
Refills: 0 | Status: DISCONTINUED | OUTPATIENT
Start: 2019-07-22 | End: 2019-07-23

## 2019-07-22 RX ORDER — CHLORHEXIDINE GLUCONATE 213 G/1000ML
1 SOLUTION TOPICAL ONCE
Refills: 0 | Status: DISCONTINUED | OUTPATIENT
Start: 2019-07-22 | End: 2019-07-22

## 2019-07-22 RX ORDER — TIOTROPIUM BROMIDE 18 UG/1
1 CAPSULE ORAL; RESPIRATORY (INHALATION) DAILY
Refills: 0 | Status: DISCONTINUED | OUTPATIENT
Start: 2019-07-22 | End: 2019-07-26

## 2019-07-22 RX ORDER — ONDANSETRON 8 MG/1
4 TABLET, FILM COATED ORAL ONCE
Refills: 0 | Status: DISCONTINUED | OUTPATIENT
Start: 2019-07-22 | End: 2019-07-22

## 2019-07-22 RX ORDER — ALBUTEROL 90 UG/1
2 AEROSOL, METERED ORAL EVERY 6 HOURS
Refills: 0 | Status: DISCONTINUED | OUTPATIENT
Start: 2019-07-22 | End: 2019-07-26

## 2019-07-22 RX ORDER — LORATADINE 10 MG/1
10 TABLET ORAL DAILY
Refills: 0 | Status: DISCONTINUED | OUTPATIENT
Start: 2019-07-22 | End: 2019-07-26

## 2019-07-22 RX ORDER — LIDOCAINE HCL 20 MG/ML
0.2 VIAL (ML) INJECTION ONCE
Refills: 0 | Status: DISCONTINUED | OUTPATIENT
Start: 2019-07-22 | End: 2019-07-22

## 2019-07-22 RX ORDER — ONDANSETRON 8 MG/1
4 TABLET, FILM COATED ORAL EVERY 6 HOURS
Refills: 0 | Status: DISCONTINUED | OUTPATIENT
Start: 2019-07-22 | End: 2019-07-26

## 2019-07-22 RX ORDER — HYDROMORPHONE HYDROCHLORIDE 2 MG/ML
0.5 INJECTION INTRAMUSCULAR; INTRAVENOUS; SUBCUTANEOUS
Refills: 0 | Status: DISCONTINUED | OUTPATIENT
Start: 2019-07-22 | End: 2019-07-22

## 2019-07-22 RX ORDER — MONTELUKAST 4 MG/1
10 TABLET, CHEWABLE ORAL DAILY
Refills: 0 | Status: DISCONTINUED | OUTPATIENT
Start: 2019-07-22 | End: 2019-07-26

## 2019-07-22 RX ORDER — HYDROMORPHONE HYDROCHLORIDE 2 MG/ML
30 INJECTION INTRAMUSCULAR; INTRAVENOUS; SUBCUTANEOUS
Refills: 0 | Status: DISCONTINUED | OUTPATIENT
Start: 2019-07-22 | End: 2019-07-23

## 2019-07-22 RX ORDER — VERAPAMIL HCL 240 MG
240 CAPSULE, EXTENDED RELEASE PELLETS 24 HR ORAL DAILY
Refills: 0 | Status: DISCONTINUED | OUTPATIENT
Start: 2019-07-22 | End: 2019-07-26

## 2019-07-22 RX ORDER — SODIUM CHLORIDE 9 MG/ML
3 INJECTION INTRAMUSCULAR; INTRAVENOUS; SUBCUTANEOUS EVERY 8 HOURS
Refills: 0 | Status: DISCONTINUED | OUTPATIENT
Start: 2019-07-22 | End: 2019-07-22

## 2019-07-22 RX ORDER — SENNA PLUS 8.6 MG/1
2 TABLET ORAL AT BEDTIME
Refills: 0 | Status: DISCONTINUED | OUTPATIENT
Start: 2019-07-22 | End: 2019-07-24

## 2019-07-22 RX ORDER — DIPHENHYDRAMINE HCL 50 MG
12.5 CAPSULE ORAL ONCE
Refills: 0 | Status: COMPLETED | OUTPATIENT
Start: 2019-07-22 | End: 2019-07-22

## 2019-07-22 RX ORDER — ACETAMINOPHEN 500 MG
1000 TABLET ORAL ONCE
Refills: 0 | Status: COMPLETED | OUTPATIENT
Start: 2019-07-22 | End: 2019-07-22

## 2019-07-22 RX ADMIN — ALBUTEROL 2 PUFF(S): 90 AEROSOL, METERED ORAL at 23:39

## 2019-07-22 RX ADMIN — DEXTROSE MONOHYDRATE, SODIUM CHLORIDE, AND POTASSIUM CHLORIDE 75 MILLILITER(S): 50; .745; 4.5 INJECTION, SOLUTION INTRAVENOUS at 13:54

## 2019-07-22 RX ADMIN — Medication 10 MILLIGRAM(S): at 13:21

## 2019-07-22 RX ADMIN — Medication 400 MILLIGRAM(S): at 13:00

## 2019-07-22 RX ADMIN — HYDROMORPHONE HYDROCHLORIDE 0.5 MILLIGRAM(S): 2 INJECTION INTRAMUSCULAR; INTRAVENOUS; SUBCUTANEOUS at 13:15

## 2019-07-22 RX ADMIN — HYDROMORPHONE HYDROCHLORIDE 0.5 MILLIGRAM(S): 2 INJECTION INTRAMUSCULAR; INTRAVENOUS; SUBCUTANEOUS at 13:30

## 2019-07-22 RX ADMIN — HYDROMORPHONE HYDROCHLORIDE 0.5 MILLIGRAM(S): 2 INJECTION INTRAMUSCULAR; INTRAVENOUS; SUBCUTANEOUS at 13:00

## 2019-07-22 RX ADMIN — HYDROMORPHONE HYDROCHLORIDE 0.5 MILLIGRAM(S): 2 INJECTION INTRAMUSCULAR; INTRAVENOUS; SUBCUTANEOUS at 13:10

## 2019-07-22 RX ADMIN — HYDROMORPHONE HYDROCHLORIDE 30 MILLILITER(S): 2 INJECTION INTRAMUSCULAR; INTRAVENOUS; SUBCUTANEOUS at 21:34

## 2019-07-22 RX ADMIN — SODIUM CHLORIDE 3 MILLILITER(S): 9 INJECTION INTRAMUSCULAR; INTRAVENOUS; SUBCUTANEOUS at 13:20

## 2019-07-22 RX ADMIN — Medication 1000 MILLIGRAM(S): at 13:30

## 2019-07-22 RX ADMIN — HYDROMORPHONE HYDROCHLORIDE 0.5 MILLIGRAM(S): 2 INJECTION INTRAMUSCULAR; INTRAVENOUS; SUBCUTANEOUS at 13:45

## 2019-07-22 RX ADMIN — Medication 12.5 MILLIGRAM(S): at 16:30

## 2019-07-22 RX ADMIN — FAMOTIDINE 20 MILLIGRAM(S): 10 INJECTION INTRAVENOUS at 13:22

## 2019-07-22 RX ADMIN — METHOCARBAMOL 1000 MILLIGRAM(S): 500 TABLET, FILM COATED ORAL at 23:39

## 2019-07-22 RX ADMIN — HYDROMORPHONE HYDROCHLORIDE 30 MILLILITER(S): 2 INJECTION INTRAMUSCULAR; INTRAVENOUS; SUBCUTANEOUS at 14:24

## 2019-07-22 RX ADMIN — Medication 100 MILLIGRAM(S): at 19:45

## 2019-07-22 NOTE — PHYSICAL THERAPY INITIAL EVALUATION ADULT - PERTINENT HX OF CURRENT PROBLEM, REHAB EVAL
54 yo M with spondylolisthesis lumbar region, c/o back pain with peripheral neuropathy, states have had epidural injection with minimal relief. Patient medical history also includes HTN, GERD, obesity s/p gastric banding (2008), PTSD, Asthma, lung nodules. Now s/p L3-L4 PLIF on 7/22

## 2019-07-22 NOTE — PHYSICAL THERAPY INITIAL EVALUATION ADULT - ADDITIONAL COMMENTS
Pt lives in a  with his daughter and spouse +2 steps to enter with U HR. All needs met on main floor. Pt was ambulating independently without use of an AD prior and was independent with all ADLS. Retired EMS and .

## 2019-07-22 NOTE — PHYSICAL THERAPY INITIAL EVALUATION ADULT - GENERAL OBSERVATIONS, REHAB EVAL
Rec'd semi-supine in bed in PACU in NAD, VSS, +PCA, +IV, +venodynes, family at b/s, +HMV x2, agreeable to PT session

## 2019-07-22 NOTE — PATIENT PROFILE ADULT - PRIMARY ROLES/RESPONSIBILITIES
Scheduled for:  EGD [id number]  Provider Name: Dr. Reed De Leon  Date:  3/27/18  Location:  [facility]  Sedation:  MAC  Time:  0602 (pt is aware to arrive at 0815)   Prep:  NPO after midnight  Meds/Allergies Reconciled?:  Physician reviewed   Diagnosis with codes:  GERD K21
retired

## 2019-07-22 NOTE — PHYSICAL THERAPY INITIAL EVALUATION ADULT - ACTIVE RANGE OF MOTION EXAMINATION, REHAB EVAL
R hip AROM limited 2/2 inc'd sciatic discomfort/Right UE Active ROM was WFL (within functional limits)/Left UE Active ROM was WFL (within functional limits)/Left LE Active ROM was WFL (within functional limits)/Right LE Active ROM was WFL (within functional limits)

## 2019-07-22 NOTE — PROGRESS NOTE ADULT - SUBJECTIVE AND OBJECTIVE BOX
Patient seen and examined at bedside.    --Anticoagulation--    T(C): 36 (07-22-19 @ 12:40), Max: 36.6 (07-22-19 @ 05:57)  HR: 92 (07-22-19 @ 14:45) (72 - 92)  BP: 127/65 (07-22-19 @ 14:45) (87/62 - 127/65)  RR: 16 (07-22-19 @ 14:45) (14 - 18)  SpO2: 98% (07-22-19 @ 14:45) (98% - 100%)  Wt(kg): --    Exam:  AAOx3, FC, 5/5 bilateral upper and lower extremities. Dressing intact without strikethrough.

## 2019-07-23 LAB
ANION GAP SERPL CALC-SCNC: 11 MMOL/L — SIGNIFICANT CHANGE UP (ref 5–17)
BASOPHILS # BLD AUTO: 0.01 K/UL — SIGNIFICANT CHANGE UP (ref 0–0.2)
BASOPHILS NFR BLD AUTO: 0.1 % — SIGNIFICANT CHANGE UP (ref 0–2)
BUN SERPL-MCNC: 12 MG/DL — SIGNIFICANT CHANGE UP (ref 7–23)
CALCIUM SERPL-MCNC: 8.9 MG/DL — SIGNIFICANT CHANGE UP (ref 8.4–10.5)
CHLORIDE SERPL-SCNC: 104 MMOL/L — SIGNIFICANT CHANGE UP (ref 96–108)
CO2 SERPL-SCNC: 25 MMOL/L — SIGNIFICANT CHANGE UP (ref 22–31)
CREAT SERPL-MCNC: 0.98 MG/DL — SIGNIFICANT CHANGE UP (ref 0.5–1.3)
EOSINOPHIL # BLD AUTO: 0 K/UL — SIGNIFICANT CHANGE UP (ref 0–0.5)
EOSINOPHIL NFR BLD AUTO: 0 % — SIGNIFICANT CHANGE UP (ref 0–6)
GLUCOSE SERPL-MCNC: 119 MG/DL — HIGH (ref 70–99)
HCT VFR BLD CALC: 35.4 % — LOW (ref 39–50)
HGB BLD-MCNC: 11.6 G/DL — LOW (ref 13–17)
IMM GRANULOCYTES NFR BLD AUTO: 0.4 % — SIGNIFICANT CHANGE UP (ref 0–1.5)
LYMPHOCYTES # BLD AUTO: 0.94 K/UL — LOW (ref 1–3.3)
LYMPHOCYTES # BLD AUTO: 8.9 % — LOW (ref 13–44)
MCHC RBC-ENTMCNC: 28.2 PG — SIGNIFICANT CHANGE UP (ref 27–34)
MCHC RBC-ENTMCNC: 32.8 GM/DL — SIGNIFICANT CHANGE UP (ref 32–36)
MCV RBC AUTO: 86.1 FL — SIGNIFICANT CHANGE UP (ref 80–100)
MONOCYTES # BLD AUTO: 0.72 K/UL — SIGNIFICANT CHANGE UP (ref 0–0.9)
MONOCYTES NFR BLD AUTO: 6.8 % — SIGNIFICANT CHANGE UP (ref 2–14)
NEUTROPHILS # BLD AUTO: 8.83 K/UL — HIGH (ref 1.8–7.4)
NEUTROPHILS NFR BLD AUTO: 83.8 % — HIGH (ref 43–77)
PLATELET # BLD AUTO: 227 K/UL — SIGNIFICANT CHANGE UP (ref 150–400)
POTASSIUM SERPL-MCNC: 5 MMOL/L — SIGNIFICANT CHANGE UP (ref 3.5–5.3)
POTASSIUM SERPL-SCNC: 5 MMOL/L — SIGNIFICANT CHANGE UP (ref 3.5–5.3)
RBC # BLD: 4.11 M/UL — LOW (ref 4.2–5.8)
RBC # FLD: 15.4 % — HIGH (ref 10.3–14.5)
SODIUM SERPL-SCNC: 140 MMOL/L — SIGNIFICANT CHANGE UP (ref 135–145)
WBC # BLD: 10.54 K/UL — HIGH (ref 3.8–10.5)
WBC # FLD AUTO: 10.54 K/UL — HIGH (ref 3.8–10.5)

## 2019-07-23 RX ORDER — OXYCODONE HYDROCHLORIDE 5 MG/1
10 TABLET ORAL EVERY 4 HOURS
Refills: 0 | Status: DISCONTINUED | OUTPATIENT
Start: 2019-07-23 | End: 2019-07-26

## 2019-07-23 RX ORDER — OXYCODONE HYDROCHLORIDE 5 MG/1
5 TABLET ORAL EVERY 4 HOURS
Refills: 0 | Status: DISCONTINUED | OUTPATIENT
Start: 2019-07-23 | End: 2019-07-26

## 2019-07-23 RX ORDER — DIPHENHYDRAMINE HCL 50 MG
50 CAPSULE ORAL EVERY 4 HOURS
Refills: 0 | Status: DISCONTINUED | OUTPATIENT
Start: 2019-07-23 | End: 2019-07-26

## 2019-07-23 RX ORDER — LANOLIN ALCOHOL/MO/W.PET/CERES
3 CREAM (GRAM) TOPICAL AT BEDTIME
Refills: 0 | Status: DISCONTINUED | OUTPATIENT
Start: 2019-07-23 | End: 2019-07-26

## 2019-07-23 RX ADMIN — LORATADINE 10 MILLIGRAM(S): 10 TABLET ORAL at 11:29

## 2019-07-23 RX ADMIN — METHOCARBAMOL 1000 MILLIGRAM(S): 500 TABLET, FILM COATED ORAL at 22:48

## 2019-07-23 RX ADMIN — TIOTROPIUM BROMIDE 1 CAPSULE(S): 18 CAPSULE ORAL; RESPIRATORY (INHALATION) at 11:29

## 2019-07-23 RX ADMIN — METHOCARBAMOL 1000 MILLIGRAM(S): 500 TABLET, FILM COATED ORAL at 17:30

## 2019-07-23 RX ADMIN — Medication 100 MILLIGRAM(S): at 02:29

## 2019-07-23 RX ADMIN — DEXTROSE MONOHYDRATE, SODIUM CHLORIDE, AND POTASSIUM CHLORIDE 75 MILLILITER(S): 50; .745; 4.5 INJECTION, SOLUTION INTRAVENOUS at 02:17

## 2019-07-23 RX ADMIN — OXYCODONE HYDROCHLORIDE 10 MILLIGRAM(S): 5 TABLET ORAL at 13:28

## 2019-07-23 RX ADMIN — FAMOTIDINE 20 MILLIGRAM(S): 10 INJECTION INTRAVENOUS at 17:30

## 2019-07-23 RX ADMIN — OXYCODONE HYDROCHLORIDE 10 MILLIGRAM(S): 5 TABLET ORAL at 20:00

## 2019-07-23 RX ADMIN — BUDESONIDE AND FORMOTEROL FUMARATE DIHYDRATE 2 PUFF(S): 160; 4.5 AEROSOL RESPIRATORY (INHALATION) at 05:40

## 2019-07-23 RX ADMIN — Medication 50 MILLIGRAM(S): at 17:11

## 2019-07-23 RX ADMIN — Medication 240 MILLIGRAM(S): at 05:42

## 2019-07-23 RX ADMIN — OXYCODONE HYDROCHLORIDE 10 MILLIGRAM(S): 5 TABLET ORAL at 14:00

## 2019-07-23 RX ADMIN — Medication 50 MILLIGRAM(S): at 22:49

## 2019-07-23 RX ADMIN — FAMOTIDINE 20 MILLIGRAM(S): 10 INJECTION INTRAVENOUS at 05:42

## 2019-07-23 RX ADMIN — BUDESONIDE AND FORMOTEROL FUMARATE DIHYDRATE 2 PUFF(S): 160; 4.5 AEROSOL RESPIRATORY (INHALATION) at 17:30

## 2019-07-23 RX ADMIN — OXYCODONE HYDROCHLORIDE 10 MILLIGRAM(S): 5 TABLET ORAL at 09:10

## 2019-07-23 RX ADMIN — OXYCODONE HYDROCHLORIDE 10 MILLIGRAM(S): 5 TABLET ORAL at 09:40

## 2019-07-23 RX ADMIN — Medication 3 MILLIGRAM(S): at 02:17

## 2019-07-23 RX ADMIN — OXYCODONE HYDROCHLORIDE 10 MILLIGRAM(S): 5 TABLET ORAL at 20:30

## 2019-07-23 RX ADMIN — METHOCARBAMOL 1000 MILLIGRAM(S): 500 TABLET, FILM COATED ORAL at 11:29

## 2019-07-23 RX ADMIN — PANTOPRAZOLE SODIUM 40 MILLIGRAM(S): 20 TABLET, DELAYED RELEASE ORAL at 05:42

## 2019-07-23 RX ADMIN — HYDROMORPHONE HYDROCHLORIDE 30 MILLILITER(S): 2 INJECTION INTRAMUSCULAR; INTRAVENOUS; SUBCUTANEOUS at 02:30

## 2019-07-23 RX ADMIN — ENOXAPARIN SODIUM 40 MILLIGRAM(S): 100 INJECTION SUBCUTANEOUS at 17:30

## 2019-07-23 RX ADMIN — LOSARTAN POTASSIUM 50 MILLIGRAM(S): 100 TABLET, FILM COATED ORAL at 05:42

## 2019-07-23 RX ADMIN — HYDROMORPHONE HYDROCHLORIDE 30 MILLILITER(S): 2 INJECTION INTRAMUSCULAR; INTRAVENOUS; SUBCUTANEOUS at 07:12

## 2019-07-23 RX ADMIN — MONTELUKAST 10 MILLIGRAM(S): 4 TABLET, CHEWABLE ORAL at 11:29

## 2019-07-23 RX ADMIN — METHOCARBAMOL 1000 MILLIGRAM(S): 500 TABLET, FILM COATED ORAL at 05:41

## 2019-07-23 NOTE — PROGRESS NOTE ADULT - SUBJECTIVE AND OBJECTIVE BOX
SUBJECTIVE: Patient seen and examined at bedside. Complains of incisional pain, minimally improved with pain medication. He ambulated through hallways today. Denies chest pain, shortness of breath, nausea/vomiting. Pre-operative back pain and bilateral lower extremity pain slightly improved post operatively.     Vital Signs Last 24 Hrs  T(C): 36.6 (07-23-19 @ 08:05), Max: 36.6 (07-22-19 @ 22:35)  T(F): 97.8 (07-23-19 @ 08:05), Max: 97.9 (07-23-19 @ 04:56)  HR: 87 (07-23-19 @ 08:05) (73 - 97)  BP: 107/62 (07-23-19 @ 08:05) (102/57 - 138/91)  BP(mean): 87 (07-22-19 @ 19:00) (72 - 91)  RR: 17 (07-23-19 @ 08:05) (11 - 19)  SpO2: 100% (07-23-19 @ 08:05) (96% - 100%)    PHYSICAL EXAM:    Constitutional: No Acute Distress, resting comfortably in bed    Neurological: Awake, alert, oriented to person, place and time, speech clear and fluent, face equal, tongue midline, briskly following commands, no drift, moves all extremities with 5/5 strength, sensation intact to light touch throughout, pupils 4mm and reactive bilaterally, extraocular movements intact, no nystagmus    Incision: +low back dressing C/D/I    Drains: +HMV    Pulmonary: Clear to Auscultation, No rales, No rhonchi, No wheezes     Cardiovascular: S1, S2, Regular rate and rhythm     Gastrointestinal: Soft, Non-tender, Non-distended, +bowel sounds x 4    Extremities: No calf tenderness bilaterally, no cyanosis, clubbing or edema          LABS:                          11.6   10.54 )-----------( 227      ( 23 Jul 2019 08:23 )             35.4    07-23    140  |  104  |  12  ----------------------------<  119<H>  5.0   |  25  |  0.98    Ca    8.9      23 Jul 2019 06:17        07-22 @ 07:01  -  07-23 @ 07:00  --------------------------------------------------------  IN: 975 mL / OUT: 3065 mL / NET: -2090 mL    07-23 @ 07:01 - 07-23 @ 13:16  --------------------------------------------------------  IN: 300 mL / OUT: 970 mL / NET: -670 mL        IMAGING:     MEDICATIONS:  Antibiotics:    Neuro:  acetaminophen   Tablet .. 650 milliGRAM(s) Oral every 6 hours PRN Temp greater or equal to 38C (100.4F), Mild Pain (1 - 3)  diazepam    Tablet 10 milliGRAM(s) Oral two times a day PRN as needed  melatonin 3 milliGRAM(s) Oral at bedtime PRN Insomnia  methocarbamol 1000 milliGRAM(s) Oral four times a day  ondansetron   Disintegrating Tablet 4 milliGRAM(s) Oral every 4 hours PRN Nausea  ondansetron Injectable 4 milliGRAM(s) IV Push every 6 hours PRN Nausea  oxyCODONE    IR 5 milliGRAM(s) Oral every 4 hours PRN Moderate Pain (4 - 6)  oxyCODONE    IR 10 milliGRAM(s) Oral every 4 hours PRN Severe Pain (7 - 10)    Cardiac:  losartan 50 milliGRAM(s) Oral daily  verapamil  milliGRAM(s) Oral daily    Pulm:  ALBUTerol    90 MICROgram(s) HFA Inhaler 2 Puff(s) Inhalation every 6 hours PRN Wheezing  buDESOnide 160 MICROgram(s)/formoterol 4.5 MICROgram(s) Inhaler 2 Puff(s) Inhalation two times a day  loratadine 10 milliGRAM(s) Oral daily  loratadine 10 milliGRAM(s) Oral daily  montelukast 10 milliGRAM(s) Oral daily  tiotropium 18 MICROgram(s) Capsule 1 Capsule(s) Inhalation daily    GI/:  docusate sodium 100 milliGRAM(s) Oral three times a day  famotidine    Tablet 20 milliGRAM(s) Oral two times a day  pantoprazole    Tablet 40 milliGRAM(s) Oral before breakfast  senna 2 Tablet(s) Oral at bedtime    Other:   enoxaparin Injectable 40 milliGRAM(s) SubCutaneous every 24 hours  fluticasone propionate 50 MICROgram(s)/spray Nasal Spray 2 Spray(s) Both Nostrils daily  naloxone Injectable 0.1 milliGRAM(s) IV Push every 3 minutes PRN For ANY of the following changes in patient status:  A. RR LESS THAN 10 breaths per minute, B. Oxygen saturation LESS THAN 90%, C. Sedation score of 6        DIET: SUBJECTIVE: Patient seen and examined at bedside. Complains of incisional pain, minimally improved with pain medication. He ambulated through hallways today. Denies chest pain, shortness of breath, nausea/vomiting. Pre-operative back pain and bilateral lower extremity pain slightly improved post operatively.     Vital Signs Last 24 Hrs  T(C): 36.6 (07-23-19 @ 08:05), Max: 36.6 (07-22-19 @ 22:35)  T(F): 97.8 (07-23-19 @ 08:05), Max: 97.9 (07-23-19 @ 04:56)  HR: 87 (07-23-19 @ 08:05) (73 - 97)  BP: 107/62 (07-23-19 @ 08:05) (102/57 - 138/91)  BP(mean): 87 (07-22-19 @ 19:00) (72 - 91)  RR: 17 (07-23-19 @ 08:05) (11 - 19)  SpO2: 100% (07-23-19 @ 08:05) (96% - 100%)    PHYSICAL EXAM:    Constitutional: No Acute Distress, resting comfortably in bed    Neurological: Awake, alert, oriented to person, place and time, speech clear and fluent, face equal, tongue midline, briskly following commands, no drift, moves all extremities with 5/5 strength, sensation intact to light touch throughout, pupils 4mm and reactive bilaterally, extraocular movements intact, no nystagmus    Incision: +low back dressing C/D/I    Drains: +HMV #1 w/ 345 cc output / 24 hrs  +HMV #2 w/ 115 cc output / 24 hrs    Pulmonary: Clear to Auscultation, No rales, No rhonchi, No wheezes     Cardiovascular: S1, S2, Regular rate and rhythm     Gastrointestinal: Soft, Non-tender, Non-distended, +bowel sounds x 4    Extremities: No calf tenderness bilaterally, no cyanosis, clubbing or edema          LABS:                          11.6   10.54 )-----------( 227      ( 23 Jul 2019 08:23 )             35.4    07-23    140  |  104  |  12  ----------------------------<  119<H>  5.0   |  25  |  0.98    Ca    8.9      23 Jul 2019 06:17        07-22 @ 07:01 - 07-23 @ 07:00  --------------------------------------------------------  IN: 975 mL / OUT: 3065 mL / NET: -2090 mL    07-23 @ 07:01 - 07-23 @ 13:16  --------------------------------------------------------  IN: 300 mL / OUT: 970 mL / NET: -670 mL        IMAGING: no new imaging    MEDICATIONS:  Antibiotics:    Neuro:  acetaminophen   Tablet .. 650 milliGRAM(s) Oral every 6 hours PRN Temp greater or equal to 38C (100.4F), Mild Pain (1 - 3)  diazepam    Tablet 10 milliGRAM(s) Oral two times a day PRN as needed  melatonin 3 milliGRAM(s) Oral at bedtime PRN Insomnia  methocarbamol 1000 milliGRAM(s) Oral four times a day  ondansetron   Disintegrating Tablet 4 milliGRAM(s) Oral every 4 hours PRN Nausea  ondansetron Injectable 4 milliGRAM(s) IV Push every 6 hours PRN Nausea  oxyCODONE    IR 5 milliGRAM(s) Oral every 4 hours PRN Moderate Pain (4 - 6)  oxyCODONE    IR 10 milliGRAM(s) Oral every 4 hours PRN Severe Pain (7 - 10)    Cardiac:  losartan 50 milliGRAM(s) Oral daily  verapamil  milliGRAM(s) Oral daily    Pulm:  ALBUTerol    90 MICROgram(s) HFA Inhaler 2 Puff(s) Inhalation every 6 hours PRN Wheezing  buDESOnide 160 MICROgram(s)/formoterol 4.5 MICROgram(s) Inhaler 2 Puff(s) Inhalation two times a day  loratadine 10 milliGRAM(s) Oral daily  loratadine 10 milliGRAM(s) Oral daily  montelukast 10 milliGRAM(s) Oral daily  tiotropium 18 MICROgram(s) Capsule 1 Capsule(s) Inhalation daily    GI/:  docusate sodium 100 milliGRAM(s) Oral three times a day  famotidine    Tablet 20 milliGRAM(s) Oral two times a day  pantoprazole    Tablet 40 milliGRAM(s) Oral before breakfast  senna 2 Tablet(s) Oral at bedtime    Other:   enoxaparin Injectable 40 milliGRAM(s) SubCutaneous every 24 hours  fluticasone propionate 50 MICROgram(s)/spray Nasal Spray 2 Spray(s) Both Nostrils daily  naloxone Injectable 0.1 milliGRAM(s) IV Push every 3 minutes PRN For ANY of the following changes in patient status:  A. RR LESS THAN 10 breaths per minute, B. Oxygen saturation LESS THAN 90%, C. Sedation score of 6        DIET: regular

## 2019-07-23 NOTE — PROGRESS NOTE ADULT - SUBJECTIVE AND OBJECTIVE BOX
Day 1 of Anesthesia Pain Management Service    SUBJECTIVE: Doing well    Pain Scale Score:	[X] Refer to charted pain scores    THERAPY:    [ ] IV PCA Morphine		        [ ] 5 mg/mL	[ ] 1 mg/mL  [X] IV PCA Hydromorphone	[ ] 5 mg/mL	[X] 1 mg/mL  [ ] IV PCA Fentanyl		        [ ] 50 micrograms/mL    Demand dose: 0.2 mg     Lockout: 6 minutes   Continuous Rate: 0 mg/hr  4 Hour Limit: 4 mg    MEDICATIONS  (STANDING):  buDESOnide 160 MICROgram(s)/formoterol 4.5 MICROgram(s) Inhaler 2 Puff(s) Inhalation two times a day  docusate sodium 100 milliGRAM(s) Oral three times a day  enoxaparin Injectable 40 milliGRAM(s) SubCutaneous every 24 hours  famotidine    Tablet 20 milliGRAM(s) Oral two times a day  fluticasone propionate 50 MICROgram(s)/spray Nasal Spray 2 Spray(s) Both Nostrils daily  loratadine 10 milliGRAM(s) Oral daily  loratadine 10 milliGRAM(s) Oral daily  losartan 50 milliGRAM(s) Oral daily  methocarbamol 1000 milliGRAM(s) Oral four times a day  montelukast 10 milliGRAM(s) Oral daily  pantoprazole    Tablet 40 milliGRAM(s) Oral before breakfast  senna 2 Tablet(s) Oral at bedtime  tiotropium 18 MICROgram(s) Capsule 1 Capsule(s) Inhalation daily  verapamil  milliGRAM(s) Oral daily    MEDICATIONS  (PRN):  acetaminophen   Tablet .. 650 milliGRAM(s) Oral every 6 hours PRN Temp greater or equal to 38C (100.4F), Mild Pain (1 - 3)  ALBUTerol    90 MICROgram(s) HFA Inhaler 2 Puff(s) Inhalation every 6 hours PRN Wheezing  diazepam    Tablet 10 milliGRAM(s) Oral two times a day PRN as needed  melatonin 3 milliGRAM(s) Oral at bedtime PRN Insomnia  naloxone Injectable 0.1 milliGRAM(s) IV Push every 3 minutes PRN For ANY of the following changes in patient status:  A. RR LESS THAN 10 breaths per minute, B. Oxygen saturation LESS THAN 90%, C. Sedation score of 6  ondansetron   Disintegrating Tablet 4 milliGRAM(s) Oral every 4 hours PRN Nausea  ondansetron Injectable 4 milliGRAM(s) IV Push every 6 hours PRN Nausea  oxyCODONE    IR 5 milliGRAM(s) Oral every 4 hours PRN Moderate Pain (4 - 6)  oxyCODONE    IR 10 milliGRAM(s) Oral every 4 hours PRN Severe Pain (7 - 10)      OBJECTIVE:    Sedation Score:	[ X] Alert	 [ ] Drowsy 	[ ] Arousable	[ ] Asleep	[ ] Unresponsive    Side Effects:	[X ] None	[ ] Nausea	[ ] Vomiting	[ ] Pruritus  		[ ] Other:    Vital Signs Last 24 Hrs  T(C): 36.6 (23 Jul 2019 08:05), Max: 36.6 (22 Jul 2019 22:35)  T(F): 97.8 (23 Jul 2019 08:05), Max: 97.9 (23 Jul 2019 04:56)  HR: 87 (23 Jul 2019 08:05) (72 - 97)  BP: 107/62 (23 Jul 2019 08:05) (102/57 - 138/91)  BP(mean): 87 (22 Jul 2019 19:00) (72 - 91)  RR: 17 (23 Jul 2019 08:05) (11 - 19)  SpO2: 100% (23 Jul 2019 08:05) (96% - 100%)    ASSESSMENT/ PLAN    Therapy to  be:               [  ] Continued   [X ] Discontinued   [ X] Changed to PRN Analgesics    Documentation and Verification of current medications:   [X] Done	[ ] Not done, not eligible    Comments: PCA D\C'd by surgical service

## 2019-07-23 NOTE — PROGRESS NOTE ADULT - ASSESSMENT
HPI: 53 year old male with a past medical history of HTN, GERD, asthma, lung nodules, obesity s/p gastric band in 2008 with spondylolisthesis lumbar region, c/o back pain with peripheral neuropathy, states have had epidural injection with minimal relief, is now scheduled for L3 laminectomy/ L3-4 combined posterolateral posterior lumbar interbody fusion/ L3-4 pedicle screw fixation/ iliac crest bone graft.    PROCEDURE: 7/22 s/p L3-4 PLIF; POD#1    PLAN:   - Continue HMVs; monitor output  - PCA discontinued today; started on Oxycodone 5/10mg PRN. Continue Valium and Robaxin PRN for muscle spasm (Valium 10mg BID and Robaxin 1000mg QID are home doses - confirmed Valium on [place]  Reference # [id number])  - Continue Losartan 50mg daily and Verapamil 240mg daily for history of HTN; BP well controlled  - Continue Proventil, Symbicort, Singulair, Spiriva for history of asthma  - Continue colace, senna for bowel regimen  - Continue Protonix and Pepcid for history of GERD  - Encouraged ambulation  - Encouraged incentive spirometer  - DVT prophylaxis: SQL, b/l venodynes  - Dispo: outpatient PT upon discharge  - Possible discharge home tomorrow   - Will discuss with Dr. Skinner    Spectralink # [id number]    Assessment:  Please Check When Present   []  GCS  E   V  M     Heart Failure: []Acute, [] acute on chronic , []chronic  Heart Failure:  [] Diastolic (HFpEF), [] Systolic (HFrEF), []Combined (HFpEF and HFrEF), [] RHF, [] Pulm HTN, [] Other    [] АННА, [] ATN, [] AIN, [] other  [] CKD1, [] CKD2, [] CKD 3, [] CKD 4, [] CKD 5, []ESRD    Encephalopathy: [] Metabolic, [] Hepatic, [] toxic, [] Neurological, [] Other    Abnormal Nurtitional Status: [] malnurtition (see nutrition note), [ ]underweight: BMI < 19, [] morbid obesity: BMI >40, [] Cachexia    [] Sepsis  [] hypovolemic shock,[] cardiogenic shock, [] hemorrhagic shock, [] neuogenic shock  [] Acute Respiratory Failure  []Cerebral edema, [] Brain compression/ herniation,   [] Functional quadriplegia  [] Acute blood loss anemia

## 2019-07-23 NOTE — CHART NOTE - NSCHARTNOTEFT_GEN_A_CORE
CAPRINI SCORE [CLOT] Score on Admission for     AGE RELATED RISK FACTORS                                                       MOBILITY RELATED FACTORS  [x ] Age 41-60 years                                            (1 Point)                  [ ] Bed rest                                                        (1 Point)  [ ] Age 61-74 years                                           (2 Points)                 [ ] Plaster cast                                                   (2 Points)  [ ] Age > 75 years                                              (3 Points)                 [ ] Bed bound for more than 72 hours         (2 Points)    DISEASE RELATED RISK FACTORS                                               GENDER SPECIFIC FACTORS  [ ] Edema in the lower extremities                       (1 Point)           [ ] Pregnancy                                                          (1 Point)  [ ] Varicose veins                                               (1 Point)                  [ ] Post-partum < 6 weeks                                   (1 Point)             [ ] BMI > 25 Kg/m2                                            (1 Point)                  [ ] Hormonal therapy  or oral contraception   (1 Point)                 [ ] Sepsis (in the previous month)                        (1 Point)            [ ] History of pregnancy complications             (1 point)  [ ] Pneumonia or serious lung disease                                            [ ] Unexplained or recurrent               (1 Point)           (in the previous month)                               (1 Point)  [ ] Abnormal pulmonary function test                     (1 Point)                 SURGERY RELATED RISK FACTORS (include planned surgeries)  [ ] Acute myocardial infarction                              (1 Point)                 [ ]  Section                                             (1 Point)  [ ] Congestive heart failure (in the previous month)  (1 Point)        [ ] Minor surgery                                                  (1 Point)   [ ] Inflammatory bowel disease                             (1 Point)                 [ ] Arthroscopic surgery                                        (2 Points)  [ ] Central venous access                                      (2 Points)  [ ] History / presence of malignancy                   (2 points)   [ ] General surgery lasting more than 45 minutes   (2 Points)       [ ] Stroke (in the previous month)                          (5 Points)               [ ] Elective arthroplasty                                         (5 Points)                                                                                                                                               HEMATOLOGY RELATED FACTORS                                                 TRAUMA RELATED RISK FACTORS  [ ] Prior episodes of VTE                                     (3 Points)                [ ] Fracture of the hip, pelvis, or leg                       (5 Points)  [ ] Positive family history for VTE                         (3 Points)             [ ] Acute spinal cord injury (in the previous month)  (5 Points)  [ ] Prothrombin 78499 A                                     (3 Points)                [ ] Paralysis  (less than 1 month)                             (5 Points)  [ ] Factor V Leiden                                             (3 Points)                   [ ] Multiple Trauma within 1 month                        (5 Points)  [ ] Lupus anticoagulants                                     (3 Points)                                                           [ ] Anticardiolipin antibodies                               (3 Points)                                                       [ ] High homocysteine in the blood                      (3 Points)                                             [ ] Other congenital or acquired thrombophilia      (3 Points)                                                [ ] Heparin induced thrombocytopenia                  (3 Points)                                          Total Score [      1    ]    Risk:  Very low 0   Low 1 to 2   Moderate 3 to 4   High =5       VTE Prophylaxis Recommendations:  [ x] mechanical pneumatic compression devices                                      [ ] contraindicated: _____________________  [ ] chemoprophylaxis                                                                                    [x ] contraindicated: ____post op_________________    **** HIGH LIKELIHOOD DVT PRESENT ON ADMISSION  [ ] (please order LE dopplers within 24 hours of admission)

## 2019-07-24 LAB
ANION GAP SERPL CALC-SCNC: 11 MMOL/L — SIGNIFICANT CHANGE UP (ref 5–17)
BASOPHILS # BLD AUTO: 0.04 K/UL — SIGNIFICANT CHANGE UP (ref 0–0.2)
BASOPHILS NFR BLD AUTO: 0.4 % — SIGNIFICANT CHANGE UP (ref 0–2)
BUN SERPL-MCNC: 16 MG/DL — SIGNIFICANT CHANGE UP (ref 7–23)
CALCIUM SERPL-MCNC: 9 MG/DL — SIGNIFICANT CHANGE UP (ref 8.4–10.5)
CHLORIDE SERPL-SCNC: 100 MMOL/L — SIGNIFICANT CHANGE UP (ref 96–108)
CO2 SERPL-SCNC: 26 MMOL/L — SIGNIFICANT CHANGE UP (ref 22–31)
CREAT SERPL-MCNC: 1.26 MG/DL — SIGNIFICANT CHANGE UP (ref 0.5–1.3)
EOSINOPHIL # BLD AUTO: 0.01 K/UL — SIGNIFICANT CHANGE UP (ref 0–0.5)
EOSINOPHIL NFR BLD AUTO: 0.1 % — SIGNIFICANT CHANGE UP (ref 0–6)
GLUCOSE SERPL-MCNC: 103 MG/DL — HIGH (ref 70–99)
HCT VFR BLD CALC: 36.9 % — LOW (ref 39–50)
HGB BLD-MCNC: 11.8 G/DL — LOW (ref 13–17)
IMM GRANULOCYTES NFR BLD AUTO: 0.6 % — SIGNIFICANT CHANGE UP (ref 0–1.5)
LYMPHOCYTES # BLD AUTO: 2.38 K/UL — SIGNIFICANT CHANGE UP (ref 1–3.3)
LYMPHOCYTES # BLD AUTO: 22.4 % — SIGNIFICANT CHANGE UP (ref 13–44)
MCHC RBC-ENTMCNC: 28.1 PG — SIGNIFICANT CHANGE UP (ref 27–34)
MCHC RBC-ENTMCNC: 32 GM/DL — SIGNIFICANT CHANGE UP (ref 32–36)
MCV RBC AUTO: 87.9 FL — SIGNIFICANT CHANGE UP (ref 80–100)
MONOCYTES # BLD AUTO: 0.98 K/UL — HIGH (ref 0–0.9)
MONOCYTES NFR BLD AUTO: 9.2 % — SIGNIFICANT CHANGE UP (ref 2–14)
NEUTROPHILS # BLD AUTO: 7.17 K/UL — SIGNIFICANT CHANGE UP (ref 1.8–7.4)
NEUTROPHILS NFR BLD AUTO: 67.3 % — SIGNIFICANT CHANGE UP (ref 43–77)
PLATELET # BLD AUTO: 249 K/UL — SIGNIFICANT CHANGE UP (ref 150–400)
POTASSIUM SERPL-MCNC: 4.8 MMOL/L — SIGNIFICANT CHANGE UP (ref 3.5–5.3)
POTASSIUM SERPL-SCNC: 4.8 MMOL/L — SIGNIFICANT CHANGE UP (ref 3.5–5.3)
RBC # BLD: 4.2 M/UL — SIGNIFICANT CHANGE UP (ref 4.2–5.8)
RBC # FLD: 15.8 % — HIGH (ref 10.3–14.5)
SODIUM SERPL-SCNC: 137 MMOL/L — SIGNIFICANT CHANGE UP (ref 135–145)
WBC # BLD: 10.64 K/UL — HIGH (ref 3.8–10.5)
WBC # FLD AUTO: 10.64 K/UL — HIGH (ref 3.8–10.5)

## 2019-07-24 RX ORDER — LOPERAMIDE HCL 2 MG
2 TABLET ORAL ONCE
Refills: 0 | Status: COMPLETED | OUTPATIENT
Start: 2019-07-24 | End: 2019-07-24

## 2019-07-24 RX ADMIN — Medication 650 MILLIGRAM(S): at 11:27

## 2019-07-24 RX ADMIN — METHOCARBAMOL 1000 MILLIGRAM(S): 500 TABLET, FILM COATED ORAL at 23:27

## 2019-07-24 RX ADMIN — OXYCODONE HYDROCHLORIDE 10 MILLIGRAM(S): 5 TABLET ORAL at 01:34

## 2019-07-24 RX ADMIN — OXYCODONE HYDROCHLORIDE 10 MILLIGRAM(S): 5 TABLET ORAL at 20:08

## 2019-07-24 RX ADMIN — Medication 100 MILLIGRAM(S): at 06:45

## 2019-07-24 RX ADMIN — TIOTROPIUM BROMIDE 1 CAPSULE(S): 18 CAPSULE ORAL; RESPIRATORY (INHALATION) at 11:27

## 2019-07-24 RX ADMIN — OXYCODONE HYDROCHLORIDE 10 MILLIGRAM(S): 5 TABLET ORAL at 19:37

## 2019-07-24 RX ADMIN — BUDESONIDE AND FORMOTEROL FUMARATE DIHYDRATE 2 PUFF(S): 160; 4.5 AEROSOL RESPIRATORY (INHALATION) at 17:24

## 2019-07-24 RX ADMIN — OXYCODONE HYDROCHLORIDE 10 MILLIGRAM(S): 5 TABLET ORAL at 01:04

## 2019-07-24 RX ADMIN — OXYCODONE HYDROCHLORIDE 10 MILLIGRAM(S): 5 TABLET ORAL at 16:11

## 2019-07-24 RX ADMIN — FAMOTIDINE 20 MILLIGRAM(S): 10 INJECTION INTRAVENOUS at 17:24

## 2019-07-24 RX ADMIN — Medication 10 MILLIGRAM(S): at 01:05

## 2019-07-24 RX ADMIN — OXYCODONE HYDROCHLORIDE 10 MILLIGRAM(S): 5 TABLET ORAL at 07:20

## 2019-07-24 RX ADMIN — OXYCODONE HYDROCHLORIDE 10 MILLIGRAM(S): 5 TABLET ORAL at 12:10

## 2019-07-24 RX ADMIN — OXYCODONE HYDROCHLORIDE 10 MILLIGRAM(S): 5 TABLET ORAL at 06:46

## 2019-07-24 RX ADMIN — BUDESONIDE AND FORMOTEROL FUMARATE DIHYDRATE 2 PUFF(S): 160; 4.5 AEROSOL RESPIRATORY (INHALATION) at 06:44

## 2019-07-24 RX ADMIN — Medication 10 MILLIGRAM(S): at 06:45

## 2019-07-24 RX ADMIN — OXYCODONE HYDROCHLORIDE 10 MILLIGRAM(S): 5 TABLET ORAL at 15:41

## 2019-07-24 RX ADMIN — ENOXAPARIN SODIUM 40 MILLIGRAM(S): 100 INJECTION SUBCUTANEOUS at 17:24

## 2019-07-24 RX ADMIN — METHOCARBAMOL 1000 MILLIGRAM(S): 500 TABLET, FILM COATED ORAL at 11:27

## 2019-07-24 RX ADMIN — Medication 100 MILLIGRAM(S): at 11:27

## 2019-07-24 RX ADMIN — PANTOPRAZOLE SODIUM 40 MILLIGRAM(S): 20 TABLET, DELAYED RELEASE ORAL at 06:45

## 2019-07-24 RX ADMIN — OXYCODONE HYDROCHLORIDE 10 MILLIGRAM(S): 5 TABLET ORAL at 11:39

## 2019-07-24 RX ADMIN — MONTELUKAST 10 MILLIGRAM(S): 4 TABLET, CHEWABLE ORAL at 11:27

## 2019-07-24 RX ADMIN — LORATADINE 10 MILLIGRAM(S): 10 TABLET ORAL at 11:27

## 2019-07-24 RX ADMIN — Medication 2 MILLIGRAM(S): at 23:45

## 2019-07-24 RX ADMIN — FAMOTIDINE 20 MILLIGRAM(S): 10 INJECTION INTRAVENOUS at 06:45

## 2019-07-24 NOTE — PROGRESS NOTE ADULT - ASSESSMENT
HPI: 53 year old male with a past medical history of HTN, GERD, asthma, lung nodules, obesity s/p gastric band in 2008 with spondylolisthesis lumbar region, c/o back pain with peripheral neuropathy, states have had epidural injection with minimal relief, is now scheduled for L3 laminectomy/ L3-4 combined posterolateral posterior lumbar interbody fusion/ L3-4 pedicle screw fixation/ iliac crest bone graft.    PROCEDURE: 7/22 s/p L3-4 PLIF; POD#2    PLAN:   - Continue HMVs; monitor output  - Continue Oxycodone 5/10mg PRN. Continue Valium and Robaxin PRN for muscle spasm (Valium 10mg BID and Robaxin 1000mg QID are home doses - confirmed Valium on [place]  Reference # [id number])  - Continue Losartan 50mg daily and Verapamil 240mg daily for history of HTN; BP well controlled  - Continue Proventil, Symbicort, Singulair, Spiriva for history of asthma  - Continue colace, senna for bowel regimen  - Continue Protonix and Pepcid for history of GERD  - Encouraged ambulation  - Encouraged incentive spirometer  - DVT prophylaxis: SQL, b/l venodynes  - Dispo: outpatient PT upon discharge  - Possible discharge home tomorrow   - Will discuss with Dr. Skinner    Spectralink # [id number]    Assessment:  Please Check When Present   []  GCS  E   V  M     Heart Failure: []Acute, [] acute on chronic , []chronic  Heart Failure:  [] Diastolic (HFpEF), [] Systolic (HFrEF), []Combined (HFpEF and HFrEF), [] RHF, [] Pulm HTN, [] Other    [] АННА, [] ATN, [] AIN, [] other  [] CKD1, [] CKD2, [] CKD 3, [] CKD 4, [] CKD 5, []ESRD    Encephalopathy: [] Metabolic, [] Hepatic, [] toxic, [] Neurological, [] Other    Abnormal Nurtitional Status: [] malnurtition (see nutrition note), [ ]underweight: BMI < 19, [] morbid obesity: BMI >40, [] Cachexia    [] Sepsis  [] hypovolemic shock,[] cardiogenic shock, [] hemorrhagic shock, [] neuogenic shock  [] Acute Respiratory Failure  []Cerebral edema, [] Brain compression/ herniation,   [] Functional quadriplegia  [] Acute blood loss anemia

## 2019-07-24 NOTE — PROGRESS NOTE ADULT - SUBJECTIVE AND OBJECTIVE BOX
SUBJECTIVE: Patient seen and examined at bedside. Still with post operative pain which patient states is minimally improved with pain medication. He ambulated through hallways today. Denies chest pain, shortness of breath, nausea/vomiting. Pre-operative back pain and bilateral lower extremity pain slightly improved post operatively.     Vital Signs Last 24 Hrs  T(C): 36.4 (07-24-19 @ 13:23), Max: 37.3 (07-24-19 @ 09:02)  T(F): 97.5 (07-24-19 @ 13:23), Max: 99.1 (07-24-19 @ 09:02)  HR: 108 (07-24-19 @ 13:23) (83 - 108)  BP: 102/67 (07-24-19 @ 13:23) (102/66 - 131/75)  RR: 16 (07-24-19 @ 13:23) (16 - 18)  SpO2: 97% (07-24-19 @ 13:23) (97% - 99%)    PHYSICAL EXAM:    Constitutional: No Acute Distress, resting comfortably in bed    Neurological: Awake, alert, oriented to person, place and time, speech clear and fluent, face equal, tongue midline, briskly following commands, no drift, moves all extremities with 5/5 strength, sensation intact to light touch throughout, pupils 4mm and reactive bilaterally, extraocular movements intact, no nystagmus    Incision: +low back dressing C/D/I    Drains: +HMV #1 w/ 175 cc output / 24 hrs  +HMV #2 w/ 90 cc output / 24 hrs    Pulmonary: Clear to Auscultation, No rales, No rhonchi, No wheezes     Cardiovascular: S1, S2, Regular rate and rhythm     Gastrointestinal: Soft, Non-tender, Non-distended, +bowel sounds x 4    Extremities: No calf tenderness bilaterally, no cyanosis, clubbing or edema          LABS:                          11.6   10.54 )-----------( 227      ( 23 Jul 2019 08:23 )             35.4    07-23    140  |  104  |  12  ----------------------------<  119<H>  5.0   |  25  |  0.98    Ca    8.9      23 Jul 2019 06:17        07-22 @ 07:01  -  07-23 @ 07:00  --------------------------------------------------------  IN: 975 mL / OUT: 3065 mL / NET: -2090 mL    07-23 @ 07:01 - 07-23 @ 13:16  --------------------------------------------------------  IN: 300 mL / OUT: 970 mL / NET: -670 mL        IMAGING: no new imaging      MEDICATIONS  (STANDING):  buDESOnide 160 MICROgram(s)/formoterol 4.5 MICROgram(s) Inhaler 2 Puff(s) Inhalation two times a day  docusate sodium 100 milliGRAM(s) Oral three times a day  enoxaparin Injectable 40 milliGRAM(s) SubCutaneous every 24 hours  famotidine    Tablet 20 milliGRAM(s) Oral two times a day  fluticasone propionate 50 MICROgram(s)/spray Nasal Spray 2 Spray(s) Both Nostrils daily  loratadine 10 milliGRAM(s) Oral daily  loratadine 10 milliGRAM(s) Oral daily  losartan 50 milliGRAM(s) Oral daily  methocarbamol 1000 milliGRAM(s) Oral four times a day  montelukast 10 milliGRAM(s) Oral daily  pantoprazole    Tablet 40 milliGRAM(s) Oral before breakfast  senna 2 Tablet(s) Oral at bedtime  tiotropium 18 MICROgram(s) Capsule 1 Capsule(s) Inhalation daily  verapamil  milliGRAM(s) Oral daily    MEDICATIONS  (PRN):  acetaminophen   Tablet .. 650 milliGRAM(s) Oral every 6 hours PRN Temp greater or equal to 38C (100.4F), Mild Pain (1 - 3)  ALBUTerol    90 MICROgram(s) HFA Inhaler 2 Puff(s) Inhalation every 6 hours PRN Wheezing  diazepam    Tablet 10 milliGRAM(s) Oral two times a day PRN as needed  diphenhydrAMINE 50 milliGRAM(s) Oral every 4 hours PRN Rash and/or Itching  melatonin 3 milliGRAM(s) Oral at bedtime PRN Insomnia  ondansetron   Disintegrating Tablet 4 milliGRAM(s) Oral every 4 hours PRN Nausea  ondansetron Injectable 4 milliGRAM(s) IV Push every 6 hours PRN Nausea  oxyCODONE    IR 5 milliGRAM(s) Oral every 4 hours PRN Moderate Pain (4 - 6)  oxyCODONE    IR 10 milliGRAM(s) Oral every 4 hours PRN Severe Pain (7 - 10)          DIET: regular

## 2019-07-24 NOTE — PROGRESS NOTE ADULT - SUBJECTIVE AND OBJECTIVE BOX
HPI:  53 year old male with spondylolisthesis lumbar region, c/o back pain with peripheral neuropathy, states have had epidural injection with minimal relief, is now scheduled for L3 laminectomy/ L3-4 combined posterolateral posterior lumbar interbody fusion/ L3-4 pedicle screw fixation/ iliac crest bone graft. Patient medical history also includes HTN, GERD, obesity s/p gastric banding (2008), PTSD, Asthma, lung nodules. (08 Jul 2019 07:47)    OVERNIGHT EVENTS:  Vital Signs Last 24 Hrs  T(C): 36.4 (24 Jul 2019 13:23), Max: 37.3 (24 Jul 2019 09:02)  T(F): 97.5 (24 Jul 2019 13:23), Max: 99.1 (24 Jul 2019 09:02)  HR: 108 (24 Jul 2019 13:23) (87 - 108)  BP: 102/67 (24 Jul 2019 13:23) (102/66 - 131/75)  BP(mean): --  RR: 16 (24 Jul 2019 13:23) (16 - 18)  SpO2: 97% (24 Jul 2019 13:23) (97% - 99%)    I&O's Detail    23 Jul 2019 07:01  -  24 Jul 2019 07:00  --------------------------------------------------------  IN:    Oral Fluid: 1840 mL  Total IN: 1840 mL    OUT:    Accordian: 175 mL    Accordian: 90 mL    Indwelling Catheter - Urethral: 900 mL    Voided: 2600 mL  Total OUT: 3765 mL    Total NET: -1925 mL      24 Jul 2019 07:01  -  24 Jul 2019 13:47  --------------------------------------------------------  IN:    Oral Fluid: 840 mL  Total IN: 840 mL    OUT:    Accordian: 10 mL    Accordian: 8 mL    Voided: 700 mL  Total OUT: 718 mL    Total NET: 122 mL        I&O's Summary    23 Jul 2019 07:01  -  24 Jul 2019 07:00  --------------------------------------------------------  IN: 1840 mL / OUT: 3765 mL / NET: -1925 mL    24 Jul 2019 07:01  -  24 Jul 2019 13:47  --------------------------------------------------------  IN: 840 mL / OUT: 718 mL / NET: 122 mL        PHYSICAL EXAM:  Neurological:    Motor exam:         [x] Upper extremity                 D             B          T          WE       WF      HI                                               R         5/5        5/5        5/5       5/5     5/5       5/5                                               L          5/5        5/5        5/5       5/5     5/5       5/5         [x] Lower extremity                Ps          Ha        Quad    EHL        FHL                                               R        5/5        5/5        5/5       5/5         5/5                                               L         5/5        5/5       5/5       5/5          5/5                                                        [] warm well perfused; capillary refill <3 seconds     Sensation: [x] intact to light touch  [] decreased:     DTR's 1/2            Gait Ambulating independently    Cardiovascular:  Respiratory:  Gastrointestinal:  Genitourinary:  Extremities:  Incision/Wound: Clean and dry    TUBES/LINES:  [] CVC  [] A-line  [] Lumbar Drain  [] Ventriculostomy  [] Other    DIET:  [] NPO  [] Mechanical  [] Tube feeds    LABS:                        11.8   10.64 )-----------( 249      ( 24 Jul 2019 09:45 )             36.9     07-24    137  |  100  |  16  ----------------------------<  103<H>  4.8   |  26  |  1.26    Ca    9.0      24 Jul 2019 07:08              CAPILLARY BLOOD GLUCOSE              Drug Levels: [] N/A    CSF Analysis: [] N/A      Allergies    No Known Allergies    Intolerances      MEDICATIONS:  Antibiotics:    Neuro:  acetaminophen   Tablet .. 650 milliGRAM(s) Oral every 6 hours PRN  diazepam    Tablet 10 milliGRAM(s) Oral two times a day PRN  diphenhydrAMINE 50 milliGRAM(s) Oral every 4 hours PRN  melatonin 3 milliGRAM(s) Oral at bedtime PRN  methocarbamol 1000 milliGRAM(s) Oral four times a day  ondansetron   Disintegrating Tablet 4 milliGRAM(s) Oral every 4 hours PRN  ondansetron Injectable 4 milliGRAM(s) IV Push every 6 hours PRN  oxyCODONE    IR 5 milliGRAM(s) Oral every 4 hours PRN  oxyCODONE    IR 10 milliGRAM(s) Oral every 4 hours PRN    Anticoagulation:  enoxaparin Injectable 40 milliGRAM(s) SubCutaneous every 24 hours    OTHER:  ALBUTerol    90 MICROgram(s) HFA Inhaler 2 Puff(s) Inhalation every 6 hours PRN  buDESOnide 160 MICROgram(s)/formoterol 4.5 MICROgram(s) Inhaler 2 Puff(s) Inhalation two times a day  docusate sodium 100 milliGRAM(s) Oral three times a day  famotidine    Tablet 20 milliGRAM(s) Oral two times a day  fluticasone propionate 50 MICROgram(s)/spray Nasal Spray 2 Spray(s) Both Nostrils daily  loratadine 10 milliGRAM(s) Oral daily  loratadine 10 milliGRAM(s) Oral daily  losartan 50 milliGRAM(s) Oral daily  montelukast 10 milliGRAM(s) Oral daily  pantoprazole    Tablet 40 milliGRAM(s) Oral before breakfast  senna 2 Tablet(s) Oral at bedtime  tiotropium 18 MICROgram(s) Capsule 1 Capsule(s) Inhalation daily  verapamil  milliGRAM(s) Oral daily    IVF:    CULTURES:    RADIOLOGY & ADDITIONAL TESTS:      ASSESSMENT:  HPI:  53 year old male with spondylolisthesis lumbar region, c/o back pain with peripheral neuropathy, states have had epidural injection with minimal relief, is now scheduled for L3 laminectomy/ L3-4 combined posterolateral posterior lumbar interbody fusion/ L3-4 pedicle screw fixation/ iliac crest bone graft. Patient medical history also includes HTN, GERD, obesity s/p gastric banding (2008), PTSD, Asthma, lung nodules. (08 Jul 2019 07:47)    53y Male s/p    PLAN:  NEURO:  CARDIOVASCULAR:  PULMONARY:  RENAL:  GI:  HEME:  ID:  ENDO:    DVT PROPHYLAXIS:  [x] Venodynes                                [x] Heparin/Lovenox    FALL RISK:  [] Low Risk                                    [] Impulsive

## 2019-07-25 LAB
ANION GAP SERPL CALC-SCNC: 12 MMOL/L — SIGNIFICANT CHANGE UP (ref 5–17)
BASOPHILS # BLD AUTO: 0.03 K/UL — SIGNIFICANT CHANGE UP (ref 0–0.2)
BASOPHILS NFR BLD AUTO: 0.3 % — SIGNIFICANT CHANGE UP (ref 0–2)
BUN SERPL-MCNC: 10 MG/DL — SIGNIFICANT CHANGE UP (ref 7–23)
C DIFF GDH STL QL: NEGATIVE — SIGNIFICANT CHANGE UP
C DIFF GDH STL QL: SIGNIFICANT CHANGE UP
CALCIUM SERPL-MCNC: 8.9 MG/DL — SIGNIFICANT CHANGE UP (ref 8.4–10.5)
CHLORIDE SERPL-SCNC: 96 MMOL/L — SIGNIFICANT CHANGE UP (ref 96–108)
CO2 SERPL-SCNC: 28 MMOL/L — SIGNIFICANT CHANGE UP (ref 22–31)
CREAT SERPL-MCNC: 1.2 MG/DL — SIGNIFICANT CHANGE UP (ref 0.5–1.3)
EOSINOPHIL # BLD AUTO: 0.04 K/UL — SIGNIFICANT CHANGE UP (ref 0–0.5)
EOSINOPHIL NFR BLD AUTO: 0.5 % — SIGNIFICANT CHANGE UP (ref 0–6)
GLUCOSE SERPL-MCNC: 84 MG/DL — SIGNIFICANT CHANGE UP (ref 70–99)
HCT VFR BLD CALC: 34.2 % — LOW (ref 39–50)
HGB BLD-MCNC: 10.9 G/DL — LOW (ref 13–17)
IMM GRANULOCYTES NFR BLD AUTO: 0.5 % — SIGNIFICANT CHANGE UP (ref 0–1.5)
LYMPHOCYTES # BLD AUTO: 2.3 K/UL — SIGNIFICANT CHANGE UP (ref 1–3.3)
LYMPHOCYTES # BLD AUTO: 26.7 % — SIGNIFICANT CHANGE UP (ref 13–44)
MCHC RBC-ENTMCNC: 27.4 PG — SIGNIFICANT CHANGE UP (ref 27–34)
MCHC RBC-ENTMCNC: 31.9 GM/DL — LOW (ref 32–36)
MCV RBC AUTO: 85.9 FL — SIGNIFICANT CHANGE UP (ref 80–100)
MONOCYTES # BLD AUTO: 0.9 K/UL — SIGNIFICANT CHANGE UP (ref 0–0.9)
MONOCYTES NFR BLD AUTO: 10.5 % — SIGNIFICANT CHANGE UP (ref 2–14)
NEUTROPHILS # BLD AUTO: 5.3 K/UL — SIGNIFICANT CHANGE UP (ref 1.8–7.4)
NEUTROPHILS NFR BLD AUTO: 61.5 % — SIGNIFICANT CHANGE UP (ref 43–77)
PLATELET # BLD AUTO: 250 K/UL — SIGNIFICANT CHANGE UP (ref 150–400)
POTASSIUM SERPL-MCNC: 3.6 MMOL/L — SIGNIFICANT CHANGE UP (ref 3.5–5.3)
POTASSIUM SERPL-SCNC: 3.6 MMOL/L — SIGNIFICANT CHANGE UP (ref 3.5–5.3)
RBC # BLD: 3.98 M/UL — LOW (ref 4.2–5.8)
RBC # FLD: 15.2 % — HIGH (ref 10.3–14.5)
SODIUM SERPL-SCNC: 136 MMOL/L — SIGNIFICANT CHANGE UP (ref 135–145)
WBC # BLD: 8.61 K/UL — SIGNIFICANT CHANGE UP (ref 3.8–10.5)
WBC # FLD AUTO: 8.61 K/UL — SIGNIFICANT CHANGE UP (ref 3.8–10.5)

## 2019-07-25 PROCEDURE — 99222 1ST HOSP IP/OBS MODERATE 55: CPT | Mod: GC

## 2019-07-25 RX ORDER — LOPERAMIDE HCL 2 MG
2 TABLET ORAL EVERY 6 HOURS
Refills: 0 | Status: DISCONTINUED | OUTPATIENT
Start: 2019-07-25 | End: 2019-07-26

## 2019-07-25 RX ADMIN — PANTOPRAZOLE SODIUM 40 MILLIGRAM(S): 20 TABLET, DELAYED RELEASE ORAL at 06:59

## 2019-07-25 RX ADMIN — OXYCODONE HYDROCHLORIDE 10 MILLIGRAM(S): 5 TABLET ORAL at 00:32

## 2019-07-25 RX ADMIN — OXYCODONE HYDROCHLORIDE 10 MILLIGRAM(S): 5 TABLET ORAL at 19:42

## 2019-07-25 RX ADMIN — LORATADINE 10 MILLIGRAM(S): 10 TABLET ORAL at 12:00

## 2019-07-25 RX ADMIN — OXYCODONE HYDROCHLORIDE 10 MILLIGRAM(S): 5 TABLET ORAL at 01:02

## 2019-07-25 RX ADMIN — BUDESONIDE AND FORMOTEROL FUMARATE DIHYDRATE 2 PUFF(S): 160; 4.5 AEROSOL RESPIRATORY (INHALATION) at 17:49

## 2019-07-25 RX ADMIN — OXYCODONE HYDROCHLORIDE 10 MILLIGRAM(S): 5 TABLET ORAL at 14:32

## 2019-07-25 RX ADMIN — Medication 240 MILLIGRAM(S): at 06:59

## 2019-07-25 RX ADMIN — LOSARTAN POTASSIUM 50 MILLIGRAM(S): 100 TABLET, FILM COATED ORAL at 07:00

## 2019-07-25 RX ADMIN — FAMOTIDINE 20 MILLIGRAM(S): 10 INJECTION INTRAVENOUS at 17:50

## 2019-07-25 RX ADMIN — OXYCODONE HYDROCHLORIDE 10 MILLIGRAM(S): 5 TABLET ORAL at 05:15

## 2019-07-25 RX ADMIN — METHOCARBAMOL 1000 MILLIGRAM(S): 500 TABLET, FILM COATED ORAL at 06:59

## 2019-07-25 RX ADMIN — BUDESONIDE AND FORMOTEROL FUMARATE DIHYDRATE 2 PUFF(S): 160; 4.5 AEROSOL RESPIRATORY (INHALATION) at 07:01

## 2019-07-25 RX ADMIN — METHOCARBAMOL 1000 MILLIGRAM(S): 500 TABLET, FILM COATED ORAL at 17:50

## 2019-07-25 RX ADMIN — TIOTROPIUM BROMIDE 1 CAPSULE(S): 18 CAPSULE ORAL; RESPIRATORY (INHALATION) at 12:00

## 2019-07-25 RX ADMIN — OXYCODONE HYDROCHLORIDE 10 MILLIGRAM(S): 5 TABLET ORAL at 09:35

## 2019-07-25 RX ADMIN — OXYCODONE HYDROCHLORIDE 10 MILLIGRAM(S): 5 TABLET ORAL at 20:12

## 2019-07-25 RX ADMIN — METHOCARBAMOL 1000 MILLIGRAM(S): 500 TABLET, FILM COATED ORAL at 12:00

## 2019-07-25 RX ADMIN — ENOXAPARIN SODIUM 40 MILLIGRAM(S): 100 INJECTION SUBCUTANEOUS at 17:49

## 2019-07-25 RX ADMIN — FAMOTIDINE 20 MILLIGRAM(S): 10 INJECTION INTRAVENOUS at 06:59

## 2019-07-25 RX ADMIN — OXYCODONE HYDROCHLORIDE 10 MILLIGRAM(S): 5 TABLET ORAL at 15:02

## 2019-07-25 RX ADMIN — OXYCODONE HYDROCHLORIDE 10 MILLIGRAM(S): 5 TABLET ORAL at 04:42

## 2019-07-25 RX ADMIN — OXYCODONE HYDROCHLORIDE 10 MILLIGRAM(S): 5 TABLET ORAL at 09:05

## 2019-07-25 RX ADMIN — MONTELUKAST 10 MILLIGRAM(S): 4 TABLET, CHEWABLE ORAL at 12:00

## 2019-07-25 RX ADMIN — Medication 2 MILLIGRAM(S): at 21:48

## 2019-07-25 NOTE — CONSULT NOTE ADULT - SUBJECTIVE AND OBJECTIVE BOX
Patient is a 53y old  Male who presented with a chief complaint of Admitted 7/22 s/p L3-4 PLIF (24 Jul 2019 13:36)      HPI:  53 year old male with spondylolisthesis lumbar region, c/o back pain with peripheral neuropathy, states have had epidural injection with minimal relief, is now scheduled for L3 laminectomy/ L3-4 combined posterolateral posterior lumbar interbody fusion/ L3-4 pedicle screw fixation/ iliac crest bone graft. Patient medical history also includes HTN, GERD, obesity s/p gastric bypass (2008) followed by gastric band, (~2014) PTSD, Asthma, lung nodules. (08 Jul 2019 07:47)    Pt s/p lumbar fusion 7/22  Reports baseline has BMs ~daily at home- "normal formed stools" no dumping syndrome post bariatric surgery  no nausea or vomiting  gastric band deflated pre-op for lumbar surgery.  Yesterday had first BMs after surgery on 7/22 - loose watery and "clear viscous: in appearance.  Proceeded to have 12  similar watery stools in 12 hours.  Has been tolerating PO (solids) with no associated nausea/ vomiting or abdominal distension. no fever.  no personal or family history of IBD  has not had any laxatives  some feeling of rectal pressure and hemorrhoidal discomfort  no history of ileus or SBO  no raw or uncooked foods, no sick contacts, no recent travel    Overnight received 1 dose Imodium - no decrease in stool frequency  stool specimen sent for  Cdiff today    PAST MEDICAL & SURGICAL HISTORY:  History of obesity: s/p gastric banding following gastric bypass  HTN (hypertension)  PTSD (post-traumatic stress disorder): [place] [employer] survivor ([employer])  H/O neuropathy: upper and lower extremities  Lung nodule: bilateral  Asthma: no previous intubation for asthma  S/P LASIK surgery  History of cholecystectomy  History of colonoscopy: 5 years ago (hemorrhoids)  Gastric bypass status: 2008  H/O carpal tunnel repair: bilateral  s/p aissatou      Allergies  No Known Allergies    PRE-OP HOME MEDS:  · 	Singulair 10 mg oral tablet: Last Dose Taken:  , 1 tab(s) orally once a day  · 	albuterol 90 mcg/inh inhalation aerosol: Last Dose Taken:  , 2 puff(s) inhaled 4 times a day  · 	Xyzal 5 mg oral tablet: Last Dose Taken:  , 1 tab(s) orally once a day (in the evening)  · 	Dexilant 60 mg oral delayed release capsule: Last Dose Taken:  , 1 cap(s) orally once a day  · 	raNITIdine 300 mg oral tablet: Last Dose Taken:  , 1 tab(s) orally once a day in the morning   · 	diazePAM 10 mg oral tablet: Last Dose Taken:  , 2 tablet orally as needed   · 	verapamil 240 mg/12 hours oral tablet, extended release: Last Dose Taken:  , 1 tab(s) orally once a day (in the morning)  · 	telmisartan 40 mg oral tablet: Last Dose Taken:  , 1 tab(s) orally once a day  · 	loratadine 10 mg oral tablet: Last Dose Taken:  , 1 tab(s) orally once a day  · 	Omnaris 50 mcg/inh nasal spray: Last Dose Taken:  , 1 spray(s) nasal once a day  · 	Symbicort 160 mcg-4.5 mcg/inh inhalation aerosol: 2 puff(s) inhaled 2 times a day  · 	Robaxin 500 mg oral tablet: 2 tab(s) orally 4 times a day  · 	Percocet 10/325 oral tablet: Last Dose Taken:  , 1 tab(s) orally every 6 hours, PRN  · 	1to 1 ratio tincture CBD oil: Last Dose Taken:  , at night   · 	THC oil: 0.5 ml at bedtime   · 	Spiriva 18 mcg inhalation capsule: 1 cap(s) inhaled once a day      MEDICATIONS  (STANDING):  buDESOnide 160 MICROgram(s)/formoterol 4.5 MICROgram(s) Inhaler 2 Puff(s) Inhalation two times a day  docusate sodium 100 milliGRAM(s) Oral three times a day  enoxaparin Injectable 40 milliGRAM(s) SubCutaneous every 24 hours  famotidine    Tablet 20 milliGRAM(s) Oral two times a day  fluticasone propionate 50 MICROgram(s)/spray Nasal Spray 2 Spray(s) Both Nostrils daily  loratadine 10 milliGRAM(s) Oral daily  losartan 50 milliGRAM(s) Oral daily  methocarbamol 1000 milliGRAM(s) Oral four times a day  montelukast 10 milliGRAM(s) Oral daily  tiotropium 18 MICROgram(s) Capsule 1 Capsule(s) Inhalation daily  verapamil  milliGRAM(s) Oral daily    MEDICATIONS  (PRN):  acetaminophen   Tablet .. 650 milliGRAM(s) Oral every 6 hours PRN Temp greater or equal to 38C (100.4F), Mild Pain (1 - 3)  ALBUTerol    90 MICROgram(s) HFA Inhaler 2 Puff(s) Inhalation every 6 hours PRN Wheezing  diazepam    Tablet 10 milliGRAM(s) Oral two times a day PRN as needed  diphenhydrAMINE 50 milliGRAM(s) Oral every 4 hours PRN Rash and/or Itching  melatonin 3 milliGRAM(s) Oral at bedtime PRN Insomnia  ondansetron   Disintegrating Tablet 4 milliGRAM(s) Oral every 4 hours PRN Nausea  ondansetron Injectable 4 milliGRAM(s) IV Push every 6 hours PRN Nausea  oxyCODONE    IR 5 milliGRAM(s) Oral every 4 hours PRN Moderate Pain (4 - 6)  oxyCODONE    IR 10 milliGRAM(s) Oral every 4 hours PRN Severe Pain (7 - 10)      Social History:    Marital Status:  ( X  )    (   ) Single    (   )    (  )   Lives with: (  ) alone  ( X ) children   ( X ) spouse   (  ) parents  (  ) other      Family History   IBD (  ) Yes   (X  ) No  GI Malignancy (  )  Yes    (X  ) No    Health Management  Last Colonoscopy - 5-6 years ago, hemorrhoids  Last EGD 2-3 years ago (Dr LIZBETH Chirinos)      Advanced Directives: (  X   ) None    (      ) DNR    (     ) DNI    (     ) Health Care Proxy:     Review of Systems:    General:  No wt loss, fevers, chills, night sweats,fatigue,   CV:  No pain, palpitatioins, hypo/hypertension  Resp:  No dyspnea, cough, tachypnea, wheezing  GI:  see HPI  :  No pain, bleeding, incontinence, nocturia  Muscle:  +incisional back pain  Neuro:  s/p lumbar fusion +drain x2, some pain to thighs. ambulating +incisional pain  Psych:  No fatigue, insomnia, mood problems +PTSD  Endocrine:  No polyuria, polydypsia, cold/heat intolerance  Heme:  No petechiae, ecchymosis, easy bruisability  Skin:  No rash, tattoos, scars, edema      Vital Signs Last 24 Hrs  T(C): 36.9 (25 Jul 2019 13:19), Max: 37 (25 Jul 2019 04:39)  T(F): 98.5 (25 Jul 2019 13:19), Max: 98.6 (25 Jul 2019 04:39)  HR: 89 (25 Jul 2019 13:19) (89 - 105)  BP: 91/58 (25 Jul 2019 13:19) (91/58 - 118/66)  BP(mean): --  RR: 16 (25 Jul 2019 13:19) (16 - 16)  SpO2: 97% (25 Jul 2019 13:19) (97% - 98%)    PHYSICAL EXAM:    Constitutional: NAD, well-developed pleasant WM  sitting at edge of bad and ambulating in room.  Neck: No LAD, supple  Respiratory: clear b/l no rales, rhonchi or wheeze  Cardiovascular: S1 and S2, RRR, no M  Gastrointestinal: BS+, soft, NT/ND, +well healed surgical scars.    Rectal: normal tone +external hemorrhoids - non bleeding.  no palpable rectal mass or stool, no lesion appreciated  Back: incision with dressing +hemovac x2  Extremities: No peripheral edema, neg clubbing, cyanosis  Vascular: 2+ peripheral pulses  Neurological: A/O x 3, no focal deficits  Psychiatric: Normal mood, normal affect  Skin: No rashes WH surgical scars        LABS:                        10.9   8.61  )-----------( 250      ( 25 Jul 2019 09:48 )             34.2     07-25    136  |  96  |  10  ----------------------------<  84  3.6   |  28  |  1.20    Ca    8.9      25 Jul 2019 07:48      C. difficile GDH &amp; toxins A/B by EIA . (07.25.19 @ 11:13)    Clostridium difficile GDH Toxins A&amp;B, EIA:   Negative    Clostridium difficile GDH Interpretation: Negative for toxigenic C. Difficile.  This specimen is negative for C.  Difficile glutamate dehydrogenase (GDH) antigen and negative for C.  Difficile Toxins A & B, by EIA      RADIOLOGY & ADDITIONAL TESTS:

## 2019-07-25 NOTE — PROVIDER CONTACT NOTE (OTHER) - BACKGROUND
7/22 L3 decompressive laminectomy, L3-4 combined posterolateral and posterior lumbar interbody fusion with iliac crest bone graft and local autograft.  L3-4 pedicle screw fixation

## 2019-07-25 NOTE — PROGRESS NOTE ADULT - SUBJECTIVE AND OBJECTIVE BOX
SUBJECTIVE: Patient seen and examined.  No acute distress.  Patient doing very well.  He is up walking.  Some pain in left thigh that comes and goes.  He had that pre operatively as well.  He is also complaining of 12 loose watery bowel movements in 12 hours.  He has not taken any stool softners.      Vital Signs Last 24 Hrs  T(C): 36.9 (25 Jul 2019 08:39), Max: 37 (25 Jul 2019 04:39)  T(F): 98.5 (25 Jul 2019 08:39), Max: 98.6 (25 Jul 2019 04:39)  HR: 91 (25 Jul 2019 08:39) (91 - 108)  BP: 116/67 (25 Jul 2019 08:39) (94/64 - 118/66)  BP(mean): --  RR: 16 (25 Jul 2019 08:39) (16 - 16)  SpO2: 97% (25 Jul 2019 08:39) (97% - 98%)    PHYSICAL EXAM:    Constitutional: No Acute Distress     Neurological: AOx3, Following Commands, Moving all Extremities     Motor exam:          Upper extremity                         Delt     Bicep     Tricep    HG                                                 R         5/5        5/5        5/5       5/5                                               L          5/5        5/5        5/5       5/5          Lower extremity                        HF         KF        KE       DF         PF                                                  R        5/5        5/5        5/5       5/5         5/5                                               L         5/5        5/5       5/5       5/5          5/5                                                 Sensation: [x] intact to light touch  [] decreased:     Pulmonary: Clear to Auscultation, No rales, No rhonchi, No wheezes     Cardiovascular: S1, S2, Regular rate and rhythm     Gastrointestinal: Soft, Non-tender, Non-distended     Extremities: No calf tenderness     Incision: c/d/i   LABS:                        10.9   8.61  )-----------( 250      ( 25 Jul 2019 09:48 )             34.2    07-25    136  |  96  |  10  ----------------------------<  84  3.6   |  28  |  1.20    Ca    8.9      25 Jul 2019 07:48        07-24 @ 07:01  -  07-25 @ 07:00  --------------------------------------------------------  IN: 1440 mL / OUT: 2137 mL / NET: -697 mL    07-25 @ 07:01 - 07-25 @ 11:01  --------------------------------------------------------  IN: 360 mL / OUT: 250 mL / NET: 110 mL      DRAINS: hmv 67, hmv95    MEDICATIONS:  Anticoagulation:   enoxaparin Injectable 40 milliGRAM(s) SubCutaneous every 24 hours    Antibiotics:    Endo:    Neuro:  acetaminophen   Tablet .. 650 milliGRAM(s) Oral every 6 hours PRN Temp greater or equal to 38C (100.4F), Mild Pain (1 - 3)  diazepam    Tablet 10 milliGRAM(s) Oral two times a day PRN as needed  diphenhydrAMINE 50 milliGRAM(s) Oral every 4 hours PRN Rash and/or Itching  melatonin 3 milliGRAM(s) Oral at bedtime PRN Insomnia  methocarbamol 1000 milliGRAM(s) Oral four times a day  ondansetron   Disintegrating Tablet 4 milliGRAM(s) Oral every 4 hours PRN Nausea  ondansetron Injectable 4 milliGRAM(s) IV Push every 6 hours PRN Nausea  oxyCODONE    IR 5 milliGRAM(s) Oral every 4 hours PRN Moderate Pain (4 - 6)  oxyCODONE    IR 10 milliGRAM(s) Oral every 4 hours PRN Severe Pain (7 - 10)    Cardiac:  losartan 50 milliGRAM(s) Oral daily  verapamil  milliGRAM(s) Oral daily    Pulm:  ALBUTerol    90 MICROgram(s) HFA Inhaler 2 Puff(s) Inhalation every 6 hours PRN Wheezing  buDESOnide 160 MICROgram(s)/formoterol 4.5 MICROgram(s) Inhaler 2 Puff(s) Inhalation two times a day  loratadine 10 milliGRAM(s) Oral daily  montelukast 10 milliGRAM(s) Oral daily  tiotropium 18 MICROgram(s) Capsule 1 Capsule(s) Inhalation daily    GI/:  docusate sodium 100 milliGRAM(s) Oral three times a day  famotidine    Tablet 20 milliGRAM(s) Oral two times a day    Other:   fluticasone propionate 50 MICROgram(s)/spray Nasal Spray 2 Spray(s) Both Nostrils daily    DIET: regular

## 2019-07-25 NOTE — CONSULT NOTE ADULT - ASSESSMENT
53 year old male with a past medical history of HTN, GERD, asthma, lung nodules, obesity s/p gastric band after gastric bypass with spondylolisthesis lumbar region, c/o back pain with peripheral neuropathy, states have had epidural injection with minimal relief, Pt now s/p  L3 laminectomy/ L3-4 combined posterolateral posterior lumbar interbody fusion/ L3-4 pedicle screw fixation/ iliac crest bone graft.    Acute diarrhea with benign abdominal exam and tolerating PO diet (gastric band deflated pre-op)  -C Diff negative 7/25  Clinically does not appear to be infectious etiology       RECS:  -OK for PO diet  -Given C diff negative, can give prn Imodium doses  -Monitor BMs  -Low fiber diet  -agree with change from PPI to pepcid for GI prophylaxis (decrease SE of loose stool)  -will check stool mata stain and stool culture for completeness    Discussed with Neurosurgery team    Thank you for the courtesy of this consult.  Julio Bennett PA-C    [facility]  [phone]  After hours and weekend coverage [phone] 53 year old male with a past medical history of HTN, GERD, asthma, lung nodules, obesity s/p gastric band after gastric bypass with spondylolisthesis lumbar region, c/o back pain with peripheral neuropathy, states have had epidural injection with minimal relief, Pt now s/p  L3 laminectomy/ L3-4 combined posterolateral posterior lumbar interbody fusion/ L3-4 pedicle screw fixation/ iliac crest bone graft.    Acute diarrhea with benign abdominal exam and tolerating PO diet (gastric band deflated pre-op)  -C Diff negative 7/25  Clinically does not appear to be infectious etiology       RECS:  -OK for PO diet (phase 3 bariatric low fiber)  -Given C diff negative, can give prn Imodium doses  -Monitor BMs  -agree with change from PPI to pepcid for GI prophylaxis (decrease SE of loose stool)  -will check stool mata stain and stool culture for completeness    Discussed with Neurosurgery team    Thank you for the courtesy of this consult.  Julio Bennett PA-C    [facility]  [phone]  After hours and weekend coverage [phone]

## 2019-07-25 NOTE — PROGRESS NOTE ADULT - SUBJECTIVE AND OBJECTIVE BOX
HPI:  53 year old male with spondylolisthesis lumbar region, c/o back pain with peripheral neuropathy, states have had epidural injection with minimal relief, is now scheduled for L3 laminectomy/ L3-4 combined posterolateral posterior lumbar interbody fusion/ L3-4 pedicle screw fixation/ iliac crest bone graft. Patient medical history also includes HTN, GERD, obesity s/p gastric banding (2008), PTSD, Asthma, lung nodules. (08 Jul 2019 07:47)    OVERNIGHT EVENTS:  Vital Signs Last 24 Hrs  T(C): 37 (25 Jul 2019 04:39), Max: 37.3 (24 Jul 2019 09:02)  T(F): 98.6 (25 Jul 2019 04:39), Max: 99.1 (24 Jul 2019 09:02)  HR: 99 (25 Jul 2019 04:39) (99 - 108)  BP: 109/76 (25 Jul 2019 04:39) (94/64 - 118/66)  BP(mean): --  RR: 16 (25 Jul 2019 04:39) (16 - 16)  SpO2: 97% (25 Jul 2019 04:39) (97% - 98%)    I&O's Detail    24 Jul 2019 07:01  -  25 Jul 2019 07:00  --------------------------------------------------------  IN:    Oral Fluid: 1440 mL  Total IN: 1440 mL    OUT:    Accordian: 95 mL    Accordian: 67 mL    Voided: 1975 mL  Total OUT: 2137 mL    Total NET: -697 mL        I&O's Summary    24 Jul 2019 07:01  -  25 Jul 2019 07:00  --------------------------------------------------------  IN: 1440 mL / OUT: 2137 mL / NET: -697 mL        PHYSICAL EXAM:  Neurological:    Motor exam:         [x] Upper extremity                 D             B          T          WE       WF      HI                                               R         5/5        5/5        5/5       5/5     5/5       5/5                                               L          5/5        5/5        5/5       5/5     5/5       5/5         [x] Lower extremity                Ps          Ha        Quad    EHL        FHL                                               R        5/5        5/5        5/5       5/5         5/5                                               L         5/5        5/5       5/5       5/5          5/5                                                        [] warm well perfused; capillary refill <3 seconds     Sensation: [x] intact to light touch  [] decreased:     Gait Amb independently    Cardiovascular:  Respiratory:  Gastrointestinal:  Genitourinary:  Extremities:  Incision/Wound: Clean and dry    TUBES/LINES:  [] CVC  [] A-line  [] Lumbar Drain  [] Ventriculostomy  [] Other    DIET:  [] NPO  [] Mechanical  [] Tube feeds    LABS:                        11.8   10.64 )-----------( 249      ( 24 Jul 2019 09:45 )             36.9     07-24    137  |  100  |  16  ----------------------------<  103<H>  4.8   |  26  |  1.26    Ca    9.0      24 Jul 2019 07:08              CAPILLARY BLOOD GLUCOSE              Drug Levels: [] N/A    CSF Analysis: [] N/A      Allergies    No Known Allergies    Intolerances      MEDICATIONS:  Antibiotics:    Neuro:  acetaminophen   Tablet .. 650 milliGRAM(s) Oral every 6 hours PRN  diazepam    Tablet 10 milliGRAM(s) Oral two times a day PRN  diphenhydrAMINE 50 milliGRAM(s) Oral every 4 hours PRN  melatonin 3 milliGRAM(s) Oral at bedtime PRN  methocarbamol 1000 milliGRAM(s) Oral four times a day  ondansetron   Disintegrating Tablet 4 milliGRAM(s) Oral every 4 hours PRN  ondansetron Injectable 4 milliGRAM(s) IV Push every 6 hours PRN  oxyCODONE    IR 5 milliGRAM(s) Oral every 4 hours PRN  oxyCODONE    IR 10 milliGRAM(s) Oral every 4 hours PRN    Anticoagulation:  enoxaparin Injectable 40 milliGRAM(s) SubCutaneous every 24 hours    OTHER:  ALBUTerol    90 MICROgram(s) HFA Inhaler 2 Puff(s) Inhalation every 6 hours PRN  buDESOnide 160 MICROgram(s)/formoterol 4.5 MICROgram(s) Inhaler 2 Puff(s) Inhalation two times a day  docusate sodium 100 milliGRAM(s) Oral three times a day  famotidine    Tablet 20 milliGRAM(s) Oral two times a day  fluticasone propionate 50 MICROgram(s)/spray Nasal Spray 2 Spray(s) Both Nostrils daily  loratadine 10 milliGRAM(s) Oral daily  loratadine 10 milliGRAM(s) Oral daily  losartan 50 milliGRAM(s) Oral daily  montelukast 10 milliGRAM(s) Oral daily  pantoprazole    Tablet 40 milliGRAM(s) Oral before breakfast  tiotropium 18 MICROgram(s) Capsule 1 Capsule(s) Inhalation daily  verapamil  milliGRAM(s) Oral daily    IVF:    CULTURES:    RADIOLOGY & ADDITIONAL TESTS:      ASSESSMENT:  HPI:  53 year old male with spondylolisthesis lumbar region, c/o back pain with peripheral neuropathy, states have had epidural injection with minimal relief, is now scheduled for L3 laminectomy/ L3-4 combined posterolateral posterior lumbar interbody fusion/ L3-4 pedicle screw fixation/ iliac crest bone graft. Patient medical history also includes HTN, GERD, obesity s/p gastric banding (2008), PTSD, Asthma, lung nodules. (08 Jul 2019 07:47)    53y Male s/p    PLAN:  NEURO:  CARDIOVASCULAR:  PULMONARY:  RENAL:  GI:  HEME:  ID:  ENDO:    DVT PROPHYLAXIS:  [x] Venodynes                                [x] Heparin/Lovenox    FALL RISK:  [] Low Risk                                    [] Impulsive

## 2019-07-25 NOTE — PROGRESS NOTE ADULT - ASSESSMENT
HPI: 53 year old male with a past medical history of HTN, GERD, asthma, lung nodules, obesity s/p gastric band in 2008 with spondylolisthesis lumbar region, c/o back pain with peripheral neuropathy, states have had epidural injection with minimal relief, is now scheduled for L3 laminectomy/ L3-4 combined posterolateral posterior lumbar interbody fusion/ L3-4 pedicle screw fixation/ iliac crest bone graft.    PROCEDURE: 7/22 s/p L3-4 PLIF; POD#2    PLAN:   - Continue HMVs; monitor output  - Continue Oxycodone 5/10mg PRN. Continue Valium and Robaxin PRN for muscle spasm (Valium 10mg BID and Robaxin 1000mg QID are home doses - [place] # [id number])  - Continue Losartan 50mg daily and Verapamil 240mg daily for history of HTN; BP well controlled  - Continue Proventil, Symbicort, Singulair, Spiriva for history of asthma  - no bowel regimen and has not taken any.  send for cdiff.  GI called as sample was clear watery and no color.  History of gastric bypass many years ago but has never had this before.    - dc protonix as can cause diarrhea continue pepcid.    - Encouraged ambulation  - Encouraged incentive spirometer  - DVT prophylaxis: SQL, b/l venodynes  - Dispo: outpatient PT upon discharge  - Possible discharge home tomorrow   - Will discuss with Dr. Skinner    Spectralink # [id number]    Assessment:  Please Check When Present   []  GCS  E   V  M     Heart Failure: []Acute, [] acute on chronic , []chronic  Heart Failure:  [] Diastolic (HFpEF), [] Systolic (HFrEF), []Combined (HFpEF and HFrEF), [] RHF, [] Pulm HTN, [] Other    [] АННА, [] ATN, [] AIN, [] other  [] CKD1, [] CKD2, [] CKD 3, [] CKD 4, [] CKD 5, []ESRD    Encephalopathy: [] Metabolic, [] Hepatic, [] toxic, [] Neurological, [] Other    Abnormal Nurtitional Status: [] malnurtition (see nutrition note), [ ]underweight: BMI < 19, [] morbid obesity: BMI >40, [] Cachexia    [] Sepsis  [] hypovolemic shock,[] cardiogenic shock, [] hemorrhagic shock, [] neuogenic shock  [] Acute Respiratory Failure  []Cerebral edema, [] Brain compression/ herniation,   [] Functional quadriplegia  [] Acute blood loss anemia

## 2019-07-26 ENCOUNTER — TRANSCRIPTION ENCOUNTER (OUTPATIENT)
Age: 54
End: 2019-07-26

## 2019-07-26 VITALS
RESPIRATION RATE: 18 BRPM | TEMPERATURE: 98 F | OXYGEN SATURATION: 95 % | DIASTOLIC BLOOD PRESSURE: 64 MMHG | HEART RATE: 89 BPM | SYSTOLIC BLOOD PRESSURE: 96 MMHG

## 2019-07-26 LAB
ALBUMIN SERPL ELPH-MCNC: 3.3 G/DL — SIGNIFICANT CHANGE UP (ref 3.3–5)
ALP SERPL-CCNC: 58 U/L — SIGNIFICANT CHANGE UP (ref 40–120)
ALT FLD-CCNC: 13 U/L — SIGNIFICANT CHANGE UP (ref 10–45)
ANION GAP SERPL CALC-SCNC: 12 MMOL/L — SIGNIFICANT CHANGE UP (ref 5–17)
AST SERPL-CCNC: 18 U/L — SIGNIFICANT CHANGE UP (ref 10–40)
BILIRUB SERPL-MCNC: 0.4 MG/DL — SIGNIFICANT CHANGE UP (ref 0.2–1.2)
BUN SERPL-MCNC: 17 MG/DL — SIGNIFICANT CHANGE UP (ref 7–23)
CALCIUM SERPL-MCNC: 8.6 MG/DL — SIGNIFICANT CHANGE UP (ref 8.4–10.5)
CHLORIDE SERPL-SCNC: 95 MMOL/L — LOW (ref 96–108)
CO2 SERPL-SCNC: 26 MMOL/L — SIGNIFICANT CHANGE UP (ref 22–31)
CREAT SERPL-MCNC: 1.13 MG/DL — SIGNIFICANT CHANGE UP (ref 0.5–1.3)
GLUCOSE SERPL-MCNC: 104 MG/DL — HIGH (ref 70–99)
HCT VFR BLD CALC: 30.9 % — LOW (ref 39–50)
HGB BLD-MCNC: 10.5 G/DL — LOW (ref 13–17)
MCHC RBC-ENTMCNC: 29.4 PG — SIGNIFICANT CHANGE UP (ref 27–34)
MCHC RBC-ENTMCNC: 34 GM/DL — SIGNIFICANT CHANGE UP (ref 32–36)
MCV RBC AUTO: 86.4 FL — SIGNIFICANT CHANGE UP (ref 80–100)
PLATELET # BLD AUTO: 208 K/UL — SIGNIFICANT CHANGE UP (ref 150–400)
POTASSIUM SERPL-MCNC: 3.7 MMOL/L — SIGNIFICANT CHANGE UP (ref 3.5–5.3)
POTASSIUM SERPL-SCNC: 3.7 MMOL/L — SIGNIFICANT CHANGE UP (ref 3.5–5.3)
PROT SERPL-MCNC: 5.5 G/DL — LOW (ref 6–8.3)
RBC # BLD: 3.58 M/UL — LOW (ref 4.2–5.8)
RBC # FLD: 13.8 % — SIGNIFICANT CHANGE UP (ref 10.3–14.5)
SODIUM SERPL-SCNC: 133 MMOL/L — LOW (ref 135–145)
WBC # BLD: 7.4 K/UL — SIGNIFICANT CHANGE UP (ref 3.8–10.5)
WBC # FLD AUTO: 7.4 K/UL — SIGNIFICANT CHANGE UP (ref 3.8–10.5)
WRIGHT STN STL: NEGATIVE — SIGNIFICANT CHANGE UP

## 2019-07-26 PROCEDURE — 86901 BLOOD TYPING SEROLOGIC RH(D): CPT

## 2019-07-26 PROCEDURE — 80053 COMPREHEN METABOLIC PANEL: CPT

## 2019-07-26 PROCEDURE — 97161 PT EVAL LOW COMPLEX 20 MIN: CPT

## 2019-07-26 PROCEDURE — 94640 AIRWAY INHALATION TREATMENT: CPT

## 2019-07-26 PROCEDURE — C1713: CPT

## 2019-07-26 PROCEDURE — 99233 SBSQ HOSP IP/OBS HIGH 50: CPT

## 2019-07-26 PROCEDURE — 80048 BASIC METABOLIC PNL TOTAL CA: CPT

## 2019-07-26 PROCEDURE — 85027 COMPLETE CBC AUTOMATED: CPT

## 2019-07-26 PROCEDURE — 87324 CLOSTRIDIUM AG IA: CPT

## 2019-07-26 PROCEDURE — C1889: CPT

## 2019-07-26 PROCEDURE — 87046 STOOL CULTR AEROBIC BACT EA: CPT

## 2019-07-26 PROCEDURE — 87045 FECES CULTURE AEROBIC BACT: CPT

## 2019-07-26 PROCEDURE — 86900 BLOOD TYPING SEROLOGIC ABO: CPT

## 2019-07-26 PROCEDURE — 76000 FLUOROSCOPY <1 HR PHYS/QHP: CPT

## 2019-07-26 PROCEDURE — 89055 LEUKOCYTE ASSESSMENT FECAL: CPT

## 2019-07-26 PROCEDURE — 87449 NOS EACH ORGANISM AG IA: CPT

## 2019-07-26 RX ORDER — LOPERAMIDE HCL 2 MG
1 TABLET ORAL
Qty: 90 | Refills: 0
Start: 2019-07-26

## 2019-07-26 RX ORDER — PHENYLEPHRINE-SHARK LIVER OIL-MINERAL OIL-PETROLATUM RECTAL OINTMENT
1 OINTMENT (GRAM) RECTAL
Qty: 1 | Refills: 0
Start: 2019-07-26

## 2019-07-26 RX ORDER — DIAZEPAM 5 MG
0 TABLET ORAL
Qty: 0 | Refills: 0 | DISCHARGE

## 2019-07-26 RX ORDER — ACETAMINOPHEN 500 MG
2 TABLET ORAL
Qty: 0 | Refills: 0 | DISCHARGE
Start: 2019-07-26

## 2019-07-26 RX ORDER — OXYCODONE HYDROCHLORIDE 5 MG/1
1 TABLET ORAL
Qty: 30 | Refills: 0
Start: 2019-07-26

## 2019-07-26 RX ORDER — PHENYLEPHRINE-SHARK LIVER OIL-MINERAL OIL-PETROLATUM RECTAL OINTMENT
1 OINTMENT (GRAM) RECTAL
Refills: 0 | Status: DISCONTINUED | OUTPATIENT
Start: 2019-07-26 | End: 2019-07-26

## 2019-07-26 RX ORDER — DIAZEPAM 5 MG
1 TABLET ORAL
Qty: 30 | Refills: 0
Start: 2019-07-26

## 2019-07-26 RX ADMIN — Medication 10 MILLIGRAM(S): at 02:38

## 2019-07-26 RX ADMIN — METHOCARBAMOL 1000 MILLIGRAM(S): 500 TABLET, FILM COATED ORAL at 00:20

## 2019-07-26 RX ADMIN — OXYCODONE HYDROCHLORIDE 10 MILLIGRAM(S): 5 TABLET ORAL at 05:38

## 2019-07-26 RX ADMIN — Medication 240 MILLIGRAM(S): at 05:38

## 2019-07-26 RX ADMIN — FAMOTIDINE 20 MILLIGRAM(S): 10 INJECTION INTRAVENOUS at 05:38

## 2019-07-26 RX ADMIN — LOSARTAN POTASSIUM 50 MILLIGRAM(S): 100 TABLET, FILM COATED ORAL at 06:42

## 2019-07-26 RX ADMIN — OXYCODONE HYDROCHLORIDE 10 MILLIGRAM(S): 5 TABLET ORAL at 00:50

## 2019-07-26 RX ADMIN — METHOCARBAMOL 1000 MILLIGRAM(S): 500 TABLET, FILM COATED ORAL at 05:39

## 2019-07-26 RX ADMIN — OXYCODONE HYDROCHLORIDE 10 MILLIGRAM(S): 5 TABLET ORAL at 00:20

## 2019-07-26 RX ADMIN — OXYCODONE HYDROCHLORIDE 10 MILLIGRAM(S): 5 TABLET ORAL at 06:08

## 2019-07-26 RX ADMIN — BUDESONIDE AND FORMOTEROL FUMARATE DIHYDRATE 2 PUFF(S): 160; 4.5 AEROSOL RESPIRATORY (INHALATION) at 05:39

## 2019-07-26 RX ADMIN — Medication 2 MILLIGRAM(S): at 05:41

## 2019-07-26 NOTE — DISCHARGE NOTE NURSING/CASE MANAGEMENT/SOCIAL WORK - NSDCDPATPORTLINK_GEN_ALL_CORE
You can access the [facility], offered by [facility], by registering with the following website: [email or web address]

## 2019-07-26 NOTE — DISCHARGE NOTE PROVIDER - NSDCFUADDINST_GEN_ALL_CORE_FT
You may shower tomorrow.    please keep incision clean and dry, do not submerge wound in water for prolonged periods of time, pat dry after showering, and do not use any creams or ointments to incision.  you may not drive until cleared by Dr. Skinner.

## 2019-07-26 NOTE — DISCHARGE NOTE PROVIDER - HOSPITAL COURSE
53 year old male with spondylolisthesis lumbar region, c/o back pain with peripheral neuropathy, states have had epidural injection with minimal relief, is now scheduled for L3 laminectomy/ L3-4 combined posterolateral posterior lumbar interbody fusion/ L3-4 pedicle screw fixation/ iliac crest bone graft. Patient medical history also includes HTN, GERD, obesity s/p gastric banding (2008), PTSD, Asthma, lung nodules.  On 7/22/2019 Patient had l3-4 plif.  Patient did very well.  Patient had diarrhea and seen by gastroentrology.  Patient was started on immodium and c diff was negative.  Patient seen by physical therapy and recommended for outpatient physical therapy.

## 2019-07-26 NOTE — DISCHARGE NOTE PROVIDER - NSDCCPCAREPLAN_GEN_ALL_CORE_FT
PRINCIPAL DISCHARGE DIAGNOSIS  Diagnosis: Spondylolisthesis  Assessment and Plan of Treatment: 7/22/2019 l3-4 PLIF      SECONDARY DISCHARGE DIAGNOSES  Diagnosis: HTN (hypertension)  Assessment and Plan of Treatment: continue home medication    Diagnosis: Asthma  Assessment and Plan of Treatment: continue home medication

## 2019-07-26 NOTE — DISCHARGE NOTE PROVIDER - CARE PROVIDER_API CALL
Malcolm Skinner)  Neurological Surgery  [address], [address]  [place] [address]  Phone: [phone]  Fax: [phone]  Follow Up Time:     Deisy Villalobos)  Gastroenterology  [address] [place] [address], [address]  [place] [address]  Phone: [phone]  Fax: [phone]  Follow Up Time:

## 2019-07-26 NOTE — PROGRESS NOTE ADULT - ATTENDING COMMENTS
Pt doing well  Neurologic exam non-focal  Ambulating well  Continue Hemovacs  DC planning
Pt doing well neurologically  Having liquid stools - DC stool softener  Continue hemovacs
Pt doing well  Continue hemovacs  Ambulating independently.
Pt doing well  Continues to have liquid stools - decreasing  Hemovacs DC'ed  DC home, RTC 1 week
Pt doing well  Continue hemovacs  Ambulating independently.

## 2019-07-26 NOTE — DISCHARGE NOTE PROVIDER - CARE PROVIDERS DIRECT ADDRESSES
[email or web address]

## 2019-07-26 NOTE — PROGRESS NOTE ADULT - SUBJECTIVE AND OBJECTIVE BOX
SUBJECTIVE: Patient seen and examined.  No acute distress.  Diarrhea improving.  He wants to go home       Vital Signs Last 24 Hrs  T(C): 36.9 (26 Jul 2019 05:09), Max: 36.9 (25 Jul 2019 08:39)  T(F): 98.4 (26 Jul 2019 05:09), Max: 98.5 (25 Jul 2019 08:39)  HR: 90 (26 Jul 2019 06:40) (89 - 99)  BP: 110/71 (26 Jul 2019 06:40) (91/58 - 116/67)  BP(mean): --  RR: 18 (26 Jul 2019 05:09) (16 - 18)  SpO2: 97% (26 Jul 2019 05:09) (97% - 99%)    PHYSICAL EXAM:    Constitutional: No Acute Distress     Neurological: AOx3, Following Commands, Moving all Extremities     Motor exam:          Upper extremity                         Delt     Bicep     Tricep    HG                                                 R         5/5        5/5        5/5       5/5                                               L          5/5        5/5        5/5       5/5          Lower extremity                        HF         KF        KE       DF         PF                                                  R        5/5        5/5        5/5       5/5         5/5                                               L         5/5        5/5       5/5       5/5          5/5                                                 Sensation: x[] intact to light touch  [] decreased:     Pulmonary: Clear to Auscultation, No rales, No rhonchi, No wheezes     Cardiovascular: S1, S2, Regular rate and rhythm     Gastrointestinal: Soft, Non-tender, Non-distended     Extremities: No calf tenderness     Incision: c/d/i  LABS:                        10.5   7.4   )-----------( 208      ( 26 Jul 2019 06:22 )             30.9    07-26    133<L>  |  95<L>  |  17  ----------------------------<  104<H>  3.7   |  26  |  1.13    Ca    8.6      26 Jul 2019 06:22    TPro  5.5<L>  /  Alb  3.3  /  TBili  0.4  /  DBili  x   /  AST  18  /  ALT  13  /  AlkPhos  58  07-26 07-25 @ 07:01  -  07-26 @ 07:00  --------------------------------------------------------  IN: 1570 mL / OUT: 2185 mL / NET: -615 mL      DRAINS: hmv (65/70)    MEDICATIONS:  Anticoagulation:   enoxaparin Injectable 40 milliGRAM(s) SubCutaneous every 24 hours    Antibiotics:    Endo:    Neuro:  acetaminophen   Tablet .. 650 milliGRAM(s) Oral every 6 hours PRN Temp greater or equal to 38C (100.4F), Mild Pain (1 - 3)  diazepam    Tablet 10 milliGRAM(s) Oral two times a day PRN as needed  diphenhydrAMINE 50 milliGRAM(s) Oral every 4 hours PRN Rash and/or Itching  melatonin 3 milliGRAM(s) Oral at bedtime PRN Insomnia  methocarbamol 1000 milliGRAM(s) Oral four times a day  ondansetron   Disintegrating Tablet 4 milliGRAM(s) Oral every 4 hours PRN Nausea  ondansetron Injectable 4 milliGRAM(s) IV Push every 6 hours PRN Nausea  oxyCODONE    IR 5 milliGRAM(s) Oral every 4 hours PRN Moderate Pain (4 - 6)  oxyCODONE    IR 10 milliGRAM(s) Oral every 4 hours PRN Severe Pain (7 - 10)    Cardiac:  losartan 50 milliGRAM(s) Oral daily  verapamil  milliGRAM(s) Oral daily    Pulm:  ALBUTerol    90 MICROgram(s) HFA Inhaler 2 Puff(s) Inhalation every 6 hours PRN Wheezing  buDESOnide 160 MICROgram(s)/formoterol 4.5 MICROgram(s) Inhaler 2 Puff(s) Inhalation two times a day  loratadine 10 milliGRAM(s) Oral daily  montelukast 10 milliGRAM(s) Oral daily  tiotropium 18 MICROgram(s) Capsule 1 Capsule(s) Inhalation daily    GI/:  docusate sodium 100 milliGRAM(s) Oral three times a day  famotidine    Tablet 20 milliGRAM(s) Oral two times a day  loperamide 2 milliGRAM(s) Oral every 6 hours PRN Diarrhea    Other:   fluticasone propionate 50 MICROgram(s)/spray Nasal Spray 2 Spray(s) Both Nostrils daily    DIET: low fiber

## 2019-07-26 NOTE — PROGRESS NOTE ADULT - ASSESSMENT
HPI: 53 year old male with a past medical history of HTN, GERD, asthma, lung nodules, obesity s/p gastric band in 2008 with spondylolisthesis lumbar region, c/o back pain with peripheral neuropathy, states have had epidural injection with minimal relief, is now scheduled for L3 laminectomy/ L3-4 combined posterolateral posterior lumbar interbody fusion/ L3-4 pedicle screw fixation/ iliac crest bone graft.    PROCEDURE: 7/22 s/p L3-4 PLIF; POD#2    PLAN:   -possible dc hmvs today as keeping patient in hospital and lower output.  will discuss with Dr. Skinner   - Continue Oxycodone 5/10mg PRN. Continue Valium and Robaxin PRN for muscle spasm (Valium 10mg BID and Robaxin 1000mg QID are home doses - [place]  Reference # [id number])  - Continue Losartan 50mg daily and Verapamil 240mg daily for history of HTN; BP well controlled  - Continue Proventil, Symbicort, Singulair, Spiriva for history of asthma  - cdiff negative,  GI saw.  low fiber diet and no contraindication to discharge.  Immodium prn    - dc protonix as can cause diarrhea continue pepcid.    - Encouraged ambulation  - Encouraged incentive spirometer  - DVT prophylaxis: SQL, b/l venodynes  - Dispo: outpatient PT upon discharge  - Possible discharge today  - Will discuss with Dr. Skinner    Spectralink # [id number]    Assessment:  Please Check When Present   []  GCS  E   V  M     Heart Failure: []Acute, [] acute on chronic , []chronic  Heart Failure:  [] Diastolic (HFpEF), [] Systolic (HFrEF), []Combined (HFpEF and HFrEF), [] RHF, [] Pulm HTN, [] Other    [] АННА, [] ATN, [] AIN, [] other  [] CKD1, [] CKD2, [] CKD 3, [] CKD 4, [] CKD 5, []ESRD    Encephalopathy: [] Metabolic, [] Hepatic, [] toxic, [] Neurological, [] Other    Abnormal Nurtitional Status: [] malnurtition (see nutrition note), [ ]underweight: BMI < 19, [] morbid obesity: BMI >40, [] Cachexia    [] Sepsis  [] hypovolemic shock,[] cardiogenic shock, [] hemorrhagic shock, [] neuogenic shock  [] Acute Respiratory Failure  []Cerebral edema, [] Brain compression/ herniation,   [] Functional quadriplegia  [] Acute blood loss anemia HPI: 53 year old male with a past medical history of HTN, GERD, asthma, lung nodules, obesity s/p gastric band in 2008 with spondylolisthesis lumbar region, c/o back pain with peripheral neuropathy, states have had epidural injection with minimal relief, is now scheduled for L3 laminectomy/ L3-4 combined posterolateral posterior lumbar interbody fusion/ L3-4 pedicle screw fixation/ iliac crest bone graft.    PROCEDURE: 7/22 s/p L3-4 PLIF; POD#2    PLAN:   -possible dc hmvs today as keeping patient in hospital and lower output.  will discuss with Dr. Skinner   - Continue Oxycodone 5/10mg PRN. Continue Valium and Robaxin PRN for muscle spasm (Valium 10mg BID and Robaxin 1000mg QID are home doses - [place] # [id number])  - Continue Losartan 50mg daily and Verapamil 240mg daily for history of HTN; BP well controlled  - Continue Proventil, Symbicort, Singulair, Spiriva for history of asthma  - cdiff negative,  GI saw.  low fiber diet and no contraindication to discharge.  Immodium prn    - dc protonix as can cause diarrhea continue pepcid.    - Encouraged ambulation  - Encouraged incentive spirometer  - DVT prophylaxis: SQL, b/l venodynes  - Dispo: outpatient PT upon discharge  - Possible discharge today  - Will discuss with Dr. Skinner    Spectralink # [id number]    Assessment:  Please Check When Present   []  GCS  E   V  M     Heart Failure: []Acute, [] acute on chronic , []chronic  Heart Failure:  [] Diastolic (HFpEF), [] Systolic (HFrEF), []Combined (HFpEF and HFrEF), [] RHF, [] Pulm HTN, [] Other    [] АННА, [] ATN, [] AIN, [] other  [] CKD1, [] CKD2, [] CKD 3, [] CKD 4, [] CKD 5, []ESRD    Encephalopathy: [] Metabolic, [] Hepatic, [] toxic, [] Neurological, [] Other    Abnormal Nurtitional Status: [] malnurtition (see nutrition note), [ ]underweight: BMI < 19, [] morbid obesity: BMI >40, [] Cachexia    [] Sepsis  [] hypovolemic shock,[] cardiogenic shock, [] hemorrhagic shock, [] neuogenic shock  [] Acute Respiratory Failure  []Cerebral edema, [] Brain compression/ herniation,   [] Functional quadriplegia  [x] Acute blood loss anemia h and h now stable

## 2019-07-26 NOTE — DISCHARGE NOTE PROVIDER - NSDCACTIVITY_GEN_ALL_CORE
Do not drive or operate machinery/Stairs allowed/Showering allowed/Walking - Indoors allowed/No heavy lifting/straining/Walking - Outdoors allowed/Do not make important decisions

## 2019-07-28 LAB
CULTURE RESULTS: SIGNIFICANT CHANGE UP
SPECIMEN SOURCE: SIGNIFICANT CHANGE UP

## 2019-08-19 PROBLEM — F43.10 POST-TRAUMATIC STRESS DISORDER, UNSPECIFIED: Chronic | Status: ACTIVE | Noted: 2019-07-08

## 2019-08-19 PROBLEM — I10 ESSENTIAL (PRIMARY) HYPERTENSION: Chronic | Status: ACTIVE | Noted: 2019-07-08

## 2019-08-20 ENCOUNTER — OUTPATIENT (OUTPATIENT)
Dept: OUTPATIENT SERVICES | Facility: HOSPITAL | Age: 54
LOS: 1 days | End: 2019-08-20
Payer: COMMERCIAL

## 2019-08-20 ENCOUNTER — TRANSCRIPTION ENCOUNTER (OUTPATIENT)
Age: 54
End: 2019-08-20

## 2019-08-20 VITALS
TEMPERATURE: 98 F | DIASTOLIC BLOOD PRESSURE: 60 MMHG | HEIGHT: 64 IN | WEIGHT: 184.97 LBS | SYSTOLIC BLOOD PRESSURE: 102 MMHG | RESPIRATION RATE: 17 BRPM | OXYGEN SATURATION: 95 % | HEART RATE: 72 BPM

## 2019-08-20 DIAGNOSIS — Z98.890 OTHER SPECIFIED POSTPROCEDURAL STATES: Chronic | ICD-10-CM

## 2019-08-20 DIAGNOSIS — Z98.84 BARIATRIC SURGERY STATUS: Chronic | ICD-10-CM

## 2019-08-20 DIAGNOSIS — Z90.49 ACQUIRED ABSENCE OF OTHER SPECIFIED PARTS OF DIGESTIVE TRACT: Chronic | ICD-10-CM

## 2019-08-20 DIAGNOSIS — M43.16 SPONDYLOLISTHESIS, LUMBAR REGION: ICD-10-CM

## 2019-08-20 LAB
ALBUMIN SERPL ELPH-MCNC: 4.2 G/DL — SIGNIFICANT CHANGE UP (ref 3.3–5)
ALP SERPL-CCNC: 93 U/L — SIGNIFICANT CHANGE UP (ref 40–120)
ALT FLD-CCNC: 12 U/L — SIGNIFICANT CHANGE UP (ref 4–41)
ANION GAP SERPL CALC-SCNC: 12 MMO/L — SIGNIFICANT CHANGE UP (ref 7–14)
AST SERPL-CCNC: 16 U/L — SIGNIFICANT CHANGE UP (ref 4–40)
BILIRUB SERPL-MCNC: 0.3 MG/DL — SIGNIFICANT CHANGE UP (ref 0.2–1.2)
BUN SERPL-MCNC: 24 MG/DL — HIGH (ref 7–23)
CALCIUM SERPL-MCNC: 8.9 MG/DL — SIGNIFICANT CHANGE UP (ref 8.4–10.5)
CHLORIDE SERPL-SCNC: 105 MMOL/L — SIGNIFICANT CHANGE UP (ref 98–107)
CO2 SERPL-SCNC: 25 MMOL/L — SIGNIFICANT CHANGE UP (ref 22–31)
CREAT SERPL-MCNC: 1.18 MG/DL — SIGNIFICANT CHANGE UP (ref 0.5–1.3)
GLUCOSE SERPL-MCNC: 92 MG/DL — SIGNIFICANT CHANGE UP (ref 70–99)
HCT VFR BLD CALC: 33.9 % — LOW (ref 39–50)
HGB BLD-MCNC: 10.4 G/DL — LOW (ref 13–17)
MCHC RBC-ENTMCNC: 25.1 PG — LOW (ref 27–34)
MCHC RBC-ENTMCNC: 30.7 % — LOW (ref 32–36)
MCV RBC AUTO: 81.7 FL — SIGNIFICANT CHANGE UP (ref 80–100)
NRBC # FLD: 0 K/UL — SIGNIFICANT CHANGE UP (ref 0–0)
PLATELET # BLD AUTO: 231 K/UL — SIGNIFICANT CHANGE UP (ref 150–400)
PMV BLD: 9.8 FL — SIGNIFICANT CHANGE UP (ref 7–13)
POTASSIUM SERPL-MCNC: 4.5 MMOL/L — SIGNIFICANT CHANGE UP (ref 3.5–5.3)
POTASSIUM SERPL-SCNC: 4.5 MMOL/L — SIGNIFICANT CHANGE UP (ref 3.5–5.3)
PROT SERPL-MCNC: 6.7 G/DL — SIGNIFICANT CHANGE UP (ref 6–8.3)
RBC # BLD: 4.15 M/UL — LOW (ref 4.2–5.8)
RBC # FLD: 14.5 % — SIGNIFICANT CHANGE UP (ref 10.3–14.5)
SODIUM SERPL-SCNC: 142 MMOL/L — SIGNIFICANT CHANGE UP (ref 135–145)
WBC # BLD: 5.56 K/UL — SIGNIFICANT CHANGE UP (ref 3.8–10.5)
WBC # FLD AUTO: 5.56 K/UL — SIGNIFICANT CHANGE UP (ref 3.8–10.5)

## 2019-08-20 PROCEDURE — 93010 ELECTROCARDIOGRAM REPORT: CPT

## 2019-08-20 RX ORDER — MONTELUKAST 4 MG/1
1 TABLET, CHEWABLE ORAL
Qty: 0 | Refills: 0 | DISCHARGE

## 2019-08-20 RX ORDER — SODIUM CHLORIDE 9 MG/ML
1000 INJECTION, SOLUTION INTRAVENOUS
Refills: 0 | Status: DISCONTINUED | OUTPATIENT
Start: 2019-08-21 | End: 2019-08-21

## 2019-08-20 RX ORDER — DEXLANSOPRAZOLE 30 MG/1
1 CAPSULE, DELAYED RELEASE ORAL
Qty: 0 | Refills: 0 | DISCHARGE

## 2019-08-20 RX ORDER — TIOTROPIUM BROMIDE 18 UG/1
1 CAPSULE ORAL; RESPIRATORY (INHALATION)
Qty: 0 | Refills: 0 | DISCHARGE

## 2019-08-20 RX ORDER — ALBUTEROL 90 UG/1
2 AEROSOL, METERED ORAL
Qty: 0 | Refills: 0 | DISCHARGE

## 2019-08-20 RX ORDER — METHOCARBAMOL 500 MG/1
2 TABLET, FILM COATED ORAL
Qty: 0 | Refills: 0 | DISCHARGE

## 2019-08-20 RX ORDER — TELMISARTAN 20 MG/1
1 TABLET ORAL
Qty: 0 | Refills: 0 | DISCHARGE

## 2019-08-20 RX ORDER — LEVOCETIRIZINE DIHYDROCHLORIDE 0.5 MG/ML
1 SOLUTION ORAL
Qty: 0 | Refills: 0 | DISCHARGE

## 2019-08-20 RX ORDER — BUDESONIDE AND FORMOTEROL FUMARATE DIHYDRATE 160; 4.5 UG/1; UG/1
2 AEROSOL RESPIRATORY (INHALATION)
Qty: 0 | Refills: 0 | DISCHARGE

## 2019-08-20 RX ORDER — CICLESONIDE 37 UG/1
2 AEROSOL, METERED NASAL
Qty: 0 | Refills: 0 | DISCHARGE

## 2019-08-20 RX ORDER — VERAPAMIL HCL 240 MG
1 CAPSULE, EXTENDED RELEASE PELLETS 24 HR ORAL
Qty: 0 | Refills: 0 | DISCHARGE

## 2019-08-20 RX ORDER — RANITIDINE HYDROCHLORIDE 150 MG/1
1 TABLET, FILM COATED ORAL
Qty: 0 | Refills: 0 | DISCHARGE

## 2019-08-20 RX ORDER — LORATADINE 10 MG/1
1 TABLET ORAL
Qty: 0 | Refills: 0 | DISCHARGE

## 2019-08-20 NOTE — ASU PATIENT PROFILE, ADULT - PMH
Asthma  no previous intubation for asthma, reactive airway  GERD (gastroesophageal reflux disease)    H/O neuropathy  upper and lower extremities  History of obesity  s/p gastric banding  HTN (hypertension)    Insomnia    Lung nodule  bilateral  PTSD (post-traumatic stress disorder)  [employer] survivor  PTSD (post-traumatic stress disorder)    RAD (reactive airway disease)

## 2019-08-20 NOTE — H&P PST ADULT - NSICDXFAMILYHX_GEN_ALL_CORE_FT
FAMILY HISTORY:  Family history of breast cancer, mother  age 82  Family history of pulmonary fibrosis, mom  FHx: Alzheimer's disease, father  age 84, mom

## 2019-08-20 NOTE — H&P PST ADULT - HISTORY OF PRESENT ILLNESS
53 year old male w/ a medical history also includes HTN, GERD, obesity s/p gastric banding (2008), PTSD, Asthma, lung nodules, PTSD, spondylolisthesis lumbar region, peripheral neuropathy. Pt. states is s/p L3 laminectomy/ L3-4 combined posterolateral posterior lumbar interbody fusion/ L3-4 pedicle screw fixation/ iliac crest bone graft on 7/22/19. Wound did not heal well, drainage noted, axb treatment initiated, currently on clindamycin but infection not improving, I&D recommended at this time. + low grade fever stated. 53 year old male presents to PST w/ a preop dx of spondylolisthesis, lumbar region and to be evaluated for a scheduled I&D on 8/21/19. Pt has a medical history that includes HTN, GERD, obesity s/p gastric banding (2008), PTSD, Asthma, lung nodules, PTSD, insomnia, spondylolisthesis lumbar region, peripheral neuropathy. Pt. states is s/p L3 laminectomy/ fusion on 7/22/19. Wound did not heal well, drainage noted, axb treatment initiated, currently on clindamycin but infection not improving, I&D recommended at this time. + low grade fever and persistent pain 8/10 stated.

## 2019-08-20 NOTE — H&P PST ADULT - NEGATIVE GENERAL SYMPTOMS
no malaise/no sweating/no anorexia/no weight gain/no weight loss/no polyphagia/no polyuria/no polydipsia

## 2019-08-20 NOTE — H&P PST ADULT - NEGATIVE GASTROINTESTINAL SYMPTOMS
no steatorrhea/no flatulence/no hematochezia/no hiccoughs/no diarrhea/no nausea/no abdominal pain/no vomiting/no change in bowel habits/no melena/no jaundice

## 2019-08-20 NOTE — H&P PST ADULT - ASSESSMENT
CAPRINI SCORE [CLOT updated 18]    AGE RELATED RISK FACTORS                                                       MOBILITY RELATED FACTORS  [1 ] Age 41-60 years                                            (1 Point)                    [ ] Bed rest                                                        (1 Point)  [ ] Age: 61-74 years                                           (2 Points)                  [ ] Plaster cast                                                   (2 Points)  [ ] Age= 75 years                                              (3 Points)                    [ ] Bed bound for more than 72 hours                 (2 Points)    DISEASE RELATED RISK FACTORS                                               GENDER SPECIFIC FACTORS  [ ] Edema in the lower extremities                       (1 Point)              [ ] Pregnancy                                                     (1 Point)  [ ] Varicose veins                                               (1 Point)                     [ ] Post-partum < 6 weeks                                   (1 Point)             [1 ] BMI > 25 Kg/m2                                            (1 Point)                     [ ] Hormonal therapy  or oral contraception          (1 Point)                 [ ] Sepsis (in the previous month)                        (1 Point)               [ ] History of pregnancy complications                 (1 point)  [ ] Pneumonia or serious lung disease                                               [ ] Unexplained or recurrent                     (1 Point)           (in the previous month)                               (1 Point)  [ ] Abnormal pulmonary function test                     (1 Point)                 SURGERY RELATED RISK FACTORS  [ ] Acute myocardial infarction                              (1 Point)               [ ]  Section                                             (1 Point)  [ ] Congestive heart failure (in the previous month)  (1 Point)      [ ] Minor surgery                                                  (1 Point)   [ ] Inflammatory bowel disease                             (1 Point)               [ ] Arthroscopic surgery                                        (2 Points)  [ ] Central venous access                                      (2 Points)                [2 ] General surgery lasting more than 45 minutes (2 points)  [ ] Present or previous malignancy                     (2 Points)                [ ] Elective arthroplasty                                         (5 points)    [ ] Stroke (in the previous month)                          (5 Points)                                                                                                                                                           HEMATOLOGY RELATED FACTORS                                                 TRAUMA RELATED RISK FACTORS  [ ] Prior episodes of VTE                                     (3 Points)                [ ] Fracture of the hip, pelvis, or leg                       (5 Points)  [ ] Positive family history for VTE                         (3 Points)             [ ] Acute spinal cord injury (in the previous month)  (5 Points)  [ ] Prothrombin 88617 A                                     (3 Points)               [ ] Paralysis  (less than 1 month)                             (5 Points)  [ ] Factor V Leiden                                             (3 Points)                  [ ] Multiple Trauma within 1 month                        (5 Points)  [ ] Lupus anticoagulants                                     (3 Points)                                                           [ ] Anticardiolipin antibodies                               (3 Points)                                                       [ ] High homocysteine in the blood                      (3 Points)                                             [ ] Other congenital or acquired thrombophilia      (3 Points)                                                [ ] Heparin induced thrombocytopenia                  (3 Points)                                     Total Score [ 4 ]

## 2019-08-20 NOTE — H&P PST ADULT - NEGATIVE PSYCHIATRIC SYMPTOMS
no agitation/no visual hallucinations/no anxiety/no depression/no suicidal ideation/no memory loss/no auditory hallucinations/no paranoia/no mood swings

## 2019-08-20 NOTE — H&P PST ADULT - NEGATIVE BREAST SYMPTOMS
no nipple discharge L/no breast tenderness L/no breast tenderness R/no breast lump R/no nipple discharge R/no breast lump L

## 2019-08-20 NOTE — H&P PST ADULT - NS MD HP SKIN WOUND STATUS
Back/ lumbar region, + purulent discharge noted, open. Back/ lumbar region, + purulent discharge noted, open, no surrounding erythema, + dehiscence

## 2019-08-20 NOTE — H&P PST ADULT - LOCATION OF BACK PAIN
cervical spine/thoracic spine/lumbar spine thoracic spine/lumbar region incisional dehiscence and infection/cervical spine/lumbar spine

## 2019-08-20 NOTE — ASU PATIENT PROFILE, ADULT - PSH
Gastric banding status  2008  H/O carpal tunnel repair  bilateral w/ ulnar nerve intratment  H/O laminectomy    History of cholecystectomy    History of colonoscopy  5 years ago  History of gastric surgery  1/2002  S/P LASIK surgery

## 2019-08-20 NOTE — H&P PST ADULT - NSICDXPASTSURGICALHX_GEN_ALL_CORE_FT
PAST SURGICAL HISTORY:  Gastric banding status 2008    H/O carpal tunnel repair bilateral w/ ulnar nerve intratment    H/O laminectomy     History of cholecystectomy     History of colonoscopy 5 years ago    History of gastric surgery 1/2002    S/P LASIK surgery PAST SURGICAL HISTORY:  Gastric banding status 2008    H/O carpal tunnel repair bilateral w/ ulnar nerve entrapment    H/O laminectomy     History of cholecystectomy     History of colonoscopy 5 years ago    History of gastric surgery 1/2002    S/P LASIK surgery

## 2019-08-20 NOTE — H&P PST ADULT - ACTIVITY
able to walk up stair, moderate house chores, works as an EMT, limited physical exam due to back pain

## 2019-08-20 NOTE — H&P PST ADULT - ATTENDING COMMENTS
As above.  Awaiting CT result but no external evidence of infection.  Suspect poor wound healing from nutritional standpoint given 200 lb weight loss after gastric band surgery.  Risks and benefits of wound revision extensively discussed.  Informed consent obtained.

## 2019-08-20 NOTE — H&P PST ADULT - NEGATIVE ENMT SYMPTOMS
no sinus symptoms/no vertigo/no abnormal taste sensation/no ear pain/no gum bleeding/no nasal congestion/no nasal discharge/no nose bleeds/no recurrent cold sores/no throat pain/no dysphagia/no dry mouth

## 2019-08-20 NOTE — H&P PST ADULT - GASTROINTESTINAL DETAILS
soft/no rebound tenderness/no masses palpable/no guarding/nontender/no distention/bowel sounds normal/no rigidity/no organomegaly/no bruit

## 2019-08-20 NOTE — H&P PST ADULT - NSICDXPASTMEDICALHX_GEN_ALL_CORE_FT
PAST MEDICAL HISTORY:  Asthma no previous intubation for asthma, reactive airway    H/O neuropathy upper and lower extremities    History of obesity s/p gastric banding    HTN (hypertension)     Insomnia     Lung nodule bilateral    PTSD (post-traumatic stress disorder) [facility]

## 2019-08-20 NOTE — H&P PST ADULT - NEGATIVE SKIN SYMPTOMS
no change in size/color of mole/no tumor/no pitted nails/no itching/no dryness/no brittle nails/no rash/no hair loss

## 2019-08-20 NOTE — H&P PST ADULT - RS GEN PE MLT RESP DETAILS PC
no wheezes/no rhonchi/no intercostal retractions/no rales/clear to auscultation bilaterally/no subcutaneous emphysema/breath sounds equal/good air movement/airway patent/no chest wall tenderness/respirations non-labored

## 2019-08-20 NOTE — H&P PST ADULT - NEGATIVE NEUROLOGICAL SYMPTOMS
no tremors/no transient paralysis/no generalized seizures/no focal seizures/no confusion/no loss of consciousness/no hemiparesis/no weakness/no difficulty walking/no vertigo/no loss of sensation/no facial palsy/no headache/no syncope

## 2019-08-20 NOTE — H&P PST ADULT - NSICDXPROBLEM_GEN_ALL_CORE_FT
PROBLEM DIAGNOSES  preop instructions provided   Problem: Asthma  Assessment and Plan: will continue inhalers greta-op  have been evaluated by his pulmonologist for last surgery, report to obtain     Problem: HTN (hypertension)  Assessment and Plan: will continue antihypertensive medication greta-op PROBLEM DIAGNOSES  preop instructions provided including NPO status, pepcid. Aware to c/w all meds as ordered qpm and prn as well as axb as per surgeons' order.     Problem: Asthma  Assessment and Plan: will continue inhalers greta-op  have been evaluated by his pulmonologist for last surgery, last report in chart from [facility]    Problem: HTN (hypertension)  Assessment and Plan: will continue antihypertensive medication greta-op. Last cc and echo/stress records requested Karine to fax to [facility].    Pt has MC scheduled today at 2 om, form provided.  Meets DORA precautions criteria, OR booking notified.

## 2019-08-21 ENCOUNTER — INPATIENT (INPATIENT)
Facility: HOSPITAL | Age: 54
LOS: 0 days | Discharge: HOME | End: 2019-08-22
Attending: NEUROLOGICAL SURGERY | Admitting: NEUROLOGICAL SURGERY
Payer: COMMERCIAL

## 2019-08-21 VITALS
RESPIRATION RATE: 17 BRPM | WEIGHT: 184.97 LBS | HEIGHT: 64 IN | DIASTOLIC BLOOD PRESSURE: 71 MMHG | HEART RATE: 84 BPM | SYSTOLIC BLOOD PRESSURE: 101 MMHG | TEMPERATURE: 98 F | OXYGEN SATURATION: 100 %

## 2019-08-21 DIAGNOSIS — Z98.890 OTHER SPECIFIED POSTPROCEDURAL STATES: Chronic | ICD-10-CM

## 2019-08-21 DIAGNOSIS — Z98.1 ARTHRODESIS STATUS: ICD-10-CM

## 2019-08-21 DIAGNOSIS — M43.16 SPONDYLOLISTHESIS, LUMBAR REGION: ICD-10-CM

## 2019-08-21 DIAGNOSIS — Z98.84 BARIATRIC SURGERY STATUS: Chronic | ICD-10-CM

## 2019-08-21 DIAGNOSIS — Z90.49 ACQUIRED ABSENCE OF OTHER SPECIFIED PARTS OF DIGESTIVE TRACT: Chronic | ICD-10-CM

## 2019-08-21 LAB
ANION GAP SERPL CALC-SCNC: 14 MMO/L — SIGNIFICANT CHANGE UP (ref 7–14)
BASOPHILS # BLD AUTO: 0.04 K/UL — SIGNIFICANT CHANGE UP (ref 0–0.2)
BASOPHILS NFR BLD AUTO: 0.5 % — SIGNIFICANT CHANGE UP (ref 0–2)
BUN SERPL-MCNC: 15 MG/DL — SIGNIFICANT CHANGE UP (ref 7–23)
CALCIUM SERPL-MCNC: 9.2 MG/DL — SIGNIFICANT CHANGE UP (ref 8.4–10.5)
CHLORIDE SERPL-SCNC: 101 MMOL/L — SIGNIFICANT CHANGE UP (ref 98–107)
CO2 SERPL-SCNC: 24 MMOL/L — SIGNIFICANT CHANGE UP (ref 22–31)
CREAT SERPL-MCNC: 1.02 MG/DL — SIGNIFICANT CHANGE UP (ref 0.5–1.3)
EOSINOPHIL # BLD AUTO: 0.04 K/UL — SIGNIFICANT CHANGE UP (ref 0–0.5)
EOSINOPHIL NFR BLD AUTO: 0.5 % — SIGNIFICANT CHANGE UP (ref 0–6)
GLUCOSE SERPL-MCNC: 111 MG/DL — HIGH (ref 70–99)
GRAM STN WND: SIGNIFICANT CHANGE UP
HCT VFR BLD CALC: 32.9 % — LOW (ref 39–50)
HGB BLD-MCNC: 10.4 G/DL — LOW (ref 13–17)
IMM GRANULOCYTES NFR BLD AUTO: 0.5 % — SIGNIFICANT CHANGE UP (ref 0–1.5)
LYMPHOCYTES # BLD AUTO: 1.04 K/UL — SIGNIFICANT CHANGE UP (ref 1–3.3)
LYMPHOCYTES # BLD AUTO: 11.9 % — LOW (ref 13–44)
MCHC RBC-ENTMCNC: 25.6 PG — LOW (ref 27–34)
MCHC RBC-ENTMCNC: 31.6 % — LOW (ref 32–36)
MCV RBC AUTO: 80.8 FL — SIGNIFICANT CHANGE UP (ref 80–100)
MONOCYTES # BLD AUTO: 0.22 K/UL — SIGNIFICANT CHANGE UP (ref 0–0.9)
MONOCYTES NFR BLD AUTO: 2.5 % — SIGNIFICANT CHANGE UP (ref 2–14)
NEUTROPHILS # BLD AUTO: 7.36 K/UL — SIGNIFICANT CHANGE UP (ref 1.8–7.4)
NEUTROPHILS NFR BLD AUTO: 84.1 % — HIGH (ref 43–77)
NRBC # FLD: 0 K/UL — SIGNIFICANT CHANGE UP (ref 0–0)
PLATELET # BLD AUTO: 230 K/UL — SIGNIFICANT CHANGE UP (ref 150–400)
PMV BLD: 9.8 FL — SIGNIFICANT CHANGE UP (ref 7–13)
POTASSIUM SERPL-MCNC: 4 MMOL/L — SIGNIFICANT CHANGE UP (ref 3.5–5.3)
POTASSIUM SERPL-SCNC: 4 MMOL/L — SIGNIFICANT CHANGE UP (ref 3.5–5.3)
RBC # BLD: 4.07 M/UL — LOW (ref 4.2–5.8)
RBC # FLD: 14.4 % — SIGNIFICANT CHANGE UP (ref 10.3–14.5)
SODIUM SERPL-SCNC: 139 MMOL/L — SIGNIFICANT CHANGE UP (ref 135–145)
SPECIMEN SOURCE: SIGNIFICANT CHANGE UP
WBC # BLD: 8.74 K/UL — SIGNIFICANT CHANGE UP (ref 3.8–10.5)
WBC # FLD AUTO: 8.74 K/UL — SIGNIFICANT CHANGE UP (ref 3.8–10.5)

## 2019-08-21 PROCEDURE — 72132 CT LUMBAR SPINE W/DYE: CPT | Mod: 26

## 2019-08-21 RX ORDER — OXYCODONE HYDROCHLORIDE 5 MG/1
5 TABLET ORAL EVERY 4 HOURS
Refills: 0 | Status: DISCONTINUED | OUTPATIENT
Start: 2019-08-21 | End: 2019-08-22

## 2019-08-21 RX ORDER — OXYCODONE AND ACETAMINOPHEN 5; 325 MG/1; MG/1
2 TABLET ORAL EVERY 4 HOURS
Refills: 0 | Status: DISCONTINUED | OUTPATIENT
Start: 2019-08-21 | End: 2019-08-21

## 2019-08-21 RX ORDER — HYDROMORPHONE HYDROCHLORIDE 2 MG/ML
1 INJECTION INTRAMUSCULAR; INTRAVENOUS; SUBCUTANEOUS
Refills: 0 | Status: DISCONTINUED | OUTPATIENT
Start: 2019-08-21 | End: 2019-08-22

## 2019-08-21 RX ORDER — ALBUTEROL 90 UG/1
2 AEROSOL, METERED ORAL EVERY 6 HOURS
Refills: 0 | Status: DISCONTINUED | OUTPATIENT
Start: 2019-08-21 | End: 2019-08-22

## 2019-08-21 RX ORDER — DIAZEPAM 5 MG
10 TABLET ORAL
Refills: 0 | Status: DISCONTINUED | OUTPATIENT
Start: 2019-08-21 | End: 2019-08-22

## 2019-08-21 RX ORDER — ACETAMINOPHEN 500 MG
650 TABLET ORAL EVERY 6 HOURS
Refills: 0 | Status: DISCONTINUED | OUTPATIENT
Start: 2019-08-21 | End: 2019-08-21

## 2019-08-21 RX ORDER — DIAZEPAM 5 MG
5 TABLET ORAL ONCE
Refills: 0 | Status: DISCONTINUED | OUTPATIENT
Start: 2019-08-21 | End: 2019-08-21

## 2019-08-21 RX ORDER — TIOTROPIUM BROMIDE 18 UG/1
1 CAPSULE ORAL; RESPIRATORY (INHALATION) DAILY
Refills: 0 | Status: DISCONTINUED | OUTPATIENT
Start: 2019-08-21 | End: 2019-08-22

## 2019-08-21 RX ORDER — LOSARTAN POTASSIUM 100 MG/1
50 TABLET, FILM COATED ORAL DAILY
Refills: 0 | Status: DISCONTINUED | OUTPATIENT
Start: 2019-08-21 | End: 2019-08-21

## 2019-08-21 RX ORDER — VERAPAMIL HCL 240 MG
240 CAPSULE, EXTENDED RELEASE PELLETS 24 HR ORAL DAILY
Refills: 0 | Status: DISCONTINUED | OUTPATIENT
Start: 2019-08-21 | End: 2019-08-22

## 2019-08-21 RX ORDER — ACETAMINOPHEN 500 MG
650 TABLET ORAL ONCE
Refills: 0 | Status: COMPLETED | OUTPATIENT
Start: 2019-08-21 | End: 2019-08-21

## 2019-08-21 RX ORDER — TEMAZEPAM 15 MG/1
15 CAPSULE ORAL AT BEDTIME
Refills: 0 | Status: DISCONTINUED | OUTPATIENT
Start: 2019-08-21 | End: 2019-08-22

## 2019-08-21 RX ORDER — BUDESONIDE AND FORMOTEROL FUMARATE DIHYDRATE 160; 4.5 UG/1; UG/1
2 AEROSOL RESPIRATORY (INHALATION)
Refills: 0 | Status: DISCONTINUED | OUTPATIENT
Start: 2019-08-21 | End: 2019-08-22

## 2019-08-21 RX ORDER — PANTOPRAZOLE SODIUM 20 MG/1
40 TABLET, DELAYED RELEASE ORAL
Refills: 0 | Status: DISCONTINUED | OUTPATIENT
Start: 2019-08-21 | End: 2019-08-22

## 2019-08-21 RX ORDER — ACETAMINOPHEN 500 MG
650 TABLET ORAL EVERY 4 HOURS
Refills: 0 | Status: DISCONTINUED | OUTPATIENT
Start: 2019-08-21 | End: 2019-08-22

## 2019-08-21 RX ORDER — METHOCARBAMOL 500 MG/1
500 TABLET, FILM COATED ORAL THREE TIMES A DAY
Refills: 0 | Status: DISCONTINUED | OUTPATIENT
Start: 2019-08-21 | End: 2019-08-22

## 2019-08-21 RX ORDER — OXYCODONE HYDROCHLORIDE 5 MG/1
10 TABLET ORAL EVERY 4 HOURS
Refills: 0 | Status: DISCONTINUED | OUTPATIENT
Start: 2019-08-21 | End: 2019-08-22

## 2019-08-21 RX ORDER — FENTANYL CITRATE 50 UG/ML
25 INJECTION INTRAVENOUS
Refills: 0 | Status: DISCONTINUED | OUTPATIENT
Start: 2019-08-21 | End: 2019-08-21

## 2019-08-21 RX ORDER — HYDROMORPHONE HYDROCHLORIDE 2 MG/ML
0.5 INJECTION INTRAMUSCULAR; INTRAVENOUS; SUBCUTANEOUS
Refills: 0 | Status: DISCONTINUED | OUTPATIENT
Start: 2019-08-21 | End: 2019-08-21

## 2019-08-21 RX ORDER — DEXTROSE MONOHYDRATE, SODIUM CHLORIDE, AND POTASSIUM CHLORIDE 50; .745; 4.5 G/1000ML; G/1000ML; G/1000ML
1000 INJECTION, SOLUTION INTRAVENOUS
Refills: 0 | Status: DISCONTINUED | OUTPATIENT
Start: 2019-08-21 | End: 2019-08-22

## 2019-08-21 RX ORDER — ONDANSETRON 8 MG/1
4 TABLET, FILM COATED ORAL ONCE
Refills: 0 | Status: DISCONTINUED | OUTPATIENT
Start: 2019-08-21 | End: 2019-08-21

## 2019-08-21 RX ORDER — SENNA PLUS 8.6 MG/1
2 TABLET ORAL AT BEDTIME
Refills: 0 | Status: DISCONTINUED | OUTPATIENT
Start: 2019-08-21 | End: 2019-08-22

## 2019-08-21 RX ORDER — DOCUSATE SODIUM 100 MG
100 CAPSULE ORAL THREE TIMES A DAY
Refills: 0 | Status: DISCONTINUED | OUTPATIENT
Start: 2019-08-21 | End: 2019-08-22

## 2019-08-21 RX ORDER — LORATADINE 10 MG/1
10 TABLET ORAL DAILY
Refills: 0 | Status: DISCONTINUED | OUTPATIENT
Start: 2019-08-21 | End: 2019-08-22

## 2019-08-21 RX ORDER — MONTELUKAST 4 MG/1
10 TABLET, CHEWABLE ORAL DAILY
Refills: 0 | Status: DISCONTINUED | OUTPATIENT
Start: 2019-08-21 | End: 2019-08-22

## 2019-08-21 RX ORDER — LOSARTAN POTASSIUM 100 MG/1
50 TABLET, FILM COATED ORAL DAILY
Refills: 0 | Status: DISCONTINUED | OUTPATIENT
Start: 2019-08-21 | End: 2019-08-22

## 2019-08-21 RX ORDER — VERAPAMIL HCL 240 MG
240 CAPSULE, EXTENDED RELEASE PELLETS 24 HR ORAL DAILY
Refills: 0 | Status: DISCONTINUED | OUTPATIENT
Start: 2019-08-21 | End: 2019-08-21

## 2019-08-21 RX ORDER — OXYCODONE AND ACETAMINOPHEN 5; 325 MG/1; MG/1
1 TABLET ORAL EVERY 4 HOURS
Refills: 0 | Status: DISCONTINUED | OUTPATIENT
Start: 2019-08-21 | End: 2019-08-21

## 2019-08-21 RX ORDER — CEFAZOLIN SODIUM 1 G
2000 VIAL (EA) INJECTION EVERY 8 HOURS
Refills: 0 | Status: DISCONTINUED | OUTPATIENT
Start: 2019-08-21 | End: 2019-08-22

## 2019-08-21 RX ADMIN — HYDROMORPHONE HYDROCHLORIDE 0.5 MILLIGRAM(S): 2 INJECTION INTRAMUSCULAR; INTRAVENOUS; SUBCUTANEOUS at 19:38

## 2019-08-21 RX ADMIN — SODIUM CHLORIDE 30 MILLILITER(S): 9 INJECTION, SOLUTION INTRAVENOUS at 15:13

## 2019-08-21 RX ADMIN — HYDROMORPHONE HYDROCHLORIDE 0.5 MILLIGRAM(S): 2 INJECTION INTRAMUSCULAR; INTRAVENOUS; SUBCUTANEOUS at 20:12

## 2019-08-21 RX ADMIN — OXYCODONE HYDROCHLORIDE 10 MILLIGRAM(S): 5 TABLET ORAL at 23:45

## 2019-08-21 RX ADMIN — Medication 650 MILLIGRAM(S): at 13:52

## 2019-08-21 RX ADMIN — HYDROMORPHONE HYDROCHLORIDE 1 MILLIGRAM(S): 2 INJECTION INTRAMUSCULAR; INTRAVENOUS; SUBCUTANEOUS at 21:03

## 2019-08-21 RX ADMIN — DEXTROSE MONOHYDRATE, SODIUM CHLORIDE, AND POTASSIUM CHLORIDE 75 MILLILITER(S): 50; .745; 4.5 INJECTION, SOLUTION INTRAVENOUS at 20:09

## 2019-08-21 RX ADMIN — Medication 5 MILLIGRAM(S): at 21:37

## 2019-08-21 RX ADMIN — METHOCARBAMOL 500 MILLIGRAM(S): 500 TABLET, FILM COATED ORAL at 21:20

## 2019-08-21 RX ADMIN — HYDROMORPHONE HYDROCHLORIDE 0.5 MILLIGRAM(S): 2 INJECTION INTRAMUSCULAR; INTRAVENOUS; SUBCUTANEOUS at 20:05

## 2019-08-21 RX ADMIN — BUDESONIDE AND FORMOTEROL FUMARATE DIHYDRATE 2 PUFF(S): 160; 4.5 AEROSOL RESPIRATORY (INHALATION) at 21:30

## 2019-08-21 RX ADMIN — HYDROMORPHONE HYDROCHLORIDE 0.5 MILLIGRAM(S): 2 INJECTION INTRAMUSCULAR; INTRAVENOUS; SUBCUTANEOUS at 19:48

## 2019-08-21 RX ADMIN — HYDROMORPHONE HYDROCHLORIDE 0.5 MILLIGRAM(S): 2 INJECTION INTRAMUSCULAR; INTRAVENOUS; SUBCUTANEOUS at 20:20

## 2019-08-21 RX ADMIN — HYDROMORPHONE HYDROCHLORIDE 1 MILLIGRAM(S): 2 INJECTION INTRAMUSCULAR; INTRAVENOUS; SUBCUTANEOUS at 20:46

## 2019-08-21 RX ADMIN — HYDROMORPHONE HYDROCHLORIDE 1 MILLIGRAM(S): 2 INJECTION INTRAMUSCULAR; INTRAVENOUS; SUBCUTANEOUS at 21:31

## 2019-08-21 RX ADMIN — DEXTROSE MONOHYDRATE, SODIUM CHLORIDE, AND POTASSIUM CHLORIDE 75 MILLILITER(S): 50; .745; 4.5 INJECTION, SOLUTION INTRAVENOUS at 23:46

## 2019-08-21 RX ADMIN — Medication 650 MILLIGRAM(S): at 15:12

## 2019-08-21 NOTE — PROGRESS NOTE ADULT - SUBJECTIVE AND OBJECTIVE BOX
Neurosurgery postop  C/O incisional pain and muscle spasm  ICU Vital Signs Last 24 Hrs  T(C): 36.5 (21 Aug 2019 19:30), Max: 36.6 (21 Aug 2019 11:39)  T(F): 97.7 (21 Aug 2019 19:30), Max: 97.9 (21 Aug 2019 11:39)  HR: 89 (21 Aug 2019 21:45) (60 - 89)  BP: 118/82 (21 Aug 2019 21:45) (101/71 - 122/68)  BP(mean): 91 (21 Aug 2019 21:45) (74 - 91)  ABP: --  ABP(mean): --  RR: 22 (21 Aug 2019 21:45) (10 - 23)  SpO2: 100% (21 Aug 2019 21:45) (99% - 100%)    AAO X 3  PERRLA, EOMI  CHAVEZ strength 5/5  SILT    Dressing C/D/I    MEDICATIONS  (STANDING):  buDESOnide 160 MICROgram(s)/formoterol 4.5 MICROgram(s) Inhaler 2 Puff(s) Inhalation two times a day  ceFAZolin   IVPB 2000 milliGRAM(s) IV Intermittent every 8 hours  diazepam    Tablet 10 milliGRAM(s) Oral two times a day  docusate sodium 100 milliGRAM(s) Oral three times a day  loratadine 10 milliGRAM(s) Oral daily  losartan 50 milliGRAM(s) Oral daily  methocarbamol 500 milliGRAM(s) Oral three times a day  montelukast 10 milliGRAM(s) Oral daily  pantoprazole    Tablet 40 milliGRAM(s) Oral before breakfast  senna 2 Tablet(s) Oral at bedtime  sodium chloride 0.9% with potassium chloride 20 mEq/L 1000 milliLiter(s) (75 mL/Hr) IV Continuous <Continuous>  tiotropium 18 MICROgram(s) Capsule 1 Capsule(s) Inhalation daily  verapamil  milliGRAM(s) Oral daily    MEDICATIONS  (PRN):  acetaminophen   Tablet .. 650 milliGRAM(s) Oral every 4 hours PRN Temp greater or equal to 38C (100.4F), Mild Pain (1 - 3)  ALBUTerol    90 MICROgram(s) HFA Inhaler 2 Puff(s) Inhalation every 6 hours PRN Shortness of Breath and/or Wheezing  HYDROmorphone  Injectable 1 milliGRAM(s) IV Push every 15 minutes PRN Severe Pain (7 - 10)  HYDROmorphone  Injectable 1 milliGRAM(s) IV Push every 3 hours PRN Severe Pain (7 - 10)  ondansetron Injectable 4 milliGRAM(s) IV Push once PRN Nausea and/or Vomiting  oxyCODONE    IR 5 milliGRAM(s) Oral every 4 hours PRN Moderate Pain (4 - 6)  oxyCODONE    IR 10 milliGRAM(s) Oral every 4 hours PRN Severe Pain (7 - 10)  temazepam 15 milliGRAM(s) Oral at bedtime PRN Insomnia                          10.4   8.74  )-----------( 230      ( 21 Aug 2019 19:42 )             32.9     08-21    139  |  101  |  15  ----------------------------<  111<H>  4.0   |  24  |  1.02    Ca    9.2      21 Aug 2019 19:42    TPro  6.7  /  Alb  4.2  /  TBili  0.3  /  DBili  x   /  AST  16  /  ALT  12  /  AlkPhos  93  08-20

## 2019-08-22 ENCOUNTER — TRANSCRIPTION ENCOUNTER (OUTPATIENT)
Age: 54
End: 2019-08-22

## 2019-08-22 VITALS — RESPIRATION RATE: 16 BRPM | OXYGEN SATURATION: 100 % | TEMPERATURE: 98 F

## 2019-08-22 RX ORDER — ACETAMINOPHEN 500 MG
2 TABLET ORAL
Qty: 0 | Refills: 0 | DISCHARGE
Start: 2019-08-22

## 2019-08-22 RX ORDER — DIPHENHYDRAMINE HCL 50 MG
25 CAPSULE ORAL ONCE
Refills: 0 | Status: COMPLETED | OUTPATIENT
Start: 2019-08-22 | End: 2019-08-22

## 2019-08-22 RX ORDER — CEPHALEXIN 500 MG
1 CAPSULE ORAL
Qty: 10 | Refills: 0
Start: 2019-08-22 | End: 2019-08-26

## 2019-08-22 RX ORDER — OXYCODONE HYDROCHLORIDE 5 MG/1
1 TABLET ORAL
Qty: 21 | Refills: 0
Start: 2019-08-22 | End: 2019-08-28

## 2019-08-22 RX ADMIN — Medication 650 MILLIGRAM(S): at 10:24

## 2019-08-22 RX ADMIN — Medication 650 MILLIGRAM(S): at 10:54

## 2019-08-22 RX ADMIN — Medication 25 MILLIGRAM(S): at 01:31

## 2019-08-22 RX ADMIN — Medication 10 MILLIGRAM(S): at 06:09

## 2019-08-22 RX ADMIN — PANTOPRAZOLE SODIUM 40 MILLIGRAM(S): 20 TABLET, DELAYED RELEASE ORAL at 06:09

## 2019-08-22 RX ADMIN — OXYCODONE HYDROCHLORIDE 10 MILLIGRAM(S): 5 TABLET ORAL at 05:21

## 2019-08-22 RX ADMIN — Medication 100 MILLIGRAM(S): at 03:02

## 2019-08-22 RX ADMIN — OXYCODONE HYDROCHLORIDE 10 MILLIGRAM(S): 5 TABLET ORAL at 00:15

## 2019-08-22 RX ADMIN — Medication 650 MILLIGRAM(S): at 03:48

## 2019-08-22 RX ADMIN — BUDESONIDE AND FORMOTEROL FUMARATE DIHYDRATE 2 PUFF(S): 160; 4.5 AEROSOL RESPIRATORY (INHALATION) at 10:25

## 2019-08-22 RX ADMIN — OXYCODONE HYDROCHLORIDE 10 MILLIGRAM(S): 5 TABLET ORAL at 05:52

## 2019-08-22 RX ADMIN — OXYCODONE HYDROCHLORIDE 10 MILLIGRAM(S): 5 TABLET ORAL at 11:36

## 2019-08-22 RX ADMIN — Medication 650 MILLIGRAM(S): at 03:17

## 2019-08-22 NOTE — PROGRESS NOTE ADULT - SUBJECTIVE AND OBJECTIVE BOX
ANESTHESIA POSTOP CHECK    53y Male POSTOP DAY 1 S/P Exploration and Drainage of Back Wound    Vital Signs Last 24 Hrs  T(C): 36.7 (22 Aug 2019 11:04), Max: 36.7 (21 Aug 2019 22:00)  T(F): 98 (22 Aug 2019 11:04), Max: 98.1 (21 Aug 2019 22:00)  HR: 83 (22 Aug 2019 05:20) (60 - 93)  BP: 102/68 (22 Aug 2019 05:20) (99/61 - 126/84)  BP(mean): 70 (21 Aug 2019 22:45) (70 - 92)  RR: 16 (22 Aug 2019 11:04) (10 - 23)  SpO2: 100% (22 Aug 2019 11:04) (99% - 100%)  I&O's Summary    21 Aug 2019 07:01  -  22 Aug 2019 07:00  --------------------------------------------------------  IN: 1350 mL / OUT: 625 mL / NET: 725 mL        [x ] NO APPARENT ANESTHESIA COMPLICATIONS      Comments:

## 2019-08-22 NOTE — DISCHARGE NOTE PROVIDER - NSDCFUADDINST_GEN_ALL_CORE_FT
Do not drive while taking narcotics or pain medications.   Do not lift more than 5-10 lbs.   Keep incision clean and dry and do not use any ointments.   Report to the ED for persistent fever, vomiting, headaches not relieved by medications, drainage from the incision or any change in neurologic or mental status or seizures.   Call the office to make a follow up appointment

## 2019-08-22 NOTE — PROGRESS NOTE ADULT - ASSESSMENT
53M now POD 1 s/p complex back wound closure, doing well this AM, no issues, doing well and stable for discharge    - please send with Aquacel surgical dressings, can shower and dry and then apply pad to back  - please have follow-up with Dr. Castro in 1 week    #[id number] Plastic Surgery

## 2019-08-22 NOTE — DISCHARGE NOTE PROVIDER - HOSPITAL COURSE
Hospital Course    53 year old male presents to PST w/ a preop dx of spondylolisthesis, lumbar region and to be evaluated for a scheduled I&D on 8/21/19. Pt has a medical history that includes HTN, GERD, obesity s/p gastric banding (2008), PTSD, Asthma, lung nodules, PTSD, insomnia, spondylolisthesis lumbar region, peripheral neuropathy. Pt. states is s/p L3 laminectomy/ fusion on 7/22/19. Wound did not heal well, drainage noted, axb treatment initiated, currently on clindamycin but infection not improving, I&D recommended at this time. + low grade fever and persistent pain 8/10 stated. (20 Aug 2019 08:15)        8/21 s/p wound washout and repair. Planned procedure went uneventful.    Patient stable for Dc to home with keflex 500mg bid x 5 days.    Daily aquacel dressing change to back.

## 2019-08-22 NOTE — DISCHARGE NOTE PROVIDER - PROVIDER TOKENS
PROVIDER:[TOKEN:[[id number]],FOLLOWUP:[1 week]],PROVIDER:[TOKEN:[[id number]],FOLLOWUP:[1 week]]

## 2019-08-22 NOTE — PROGRESS NOTE ADULT - ASSESSMENT
AP  dc home today per neuro  patient to see Dr Castro next week  patient should wet incision with soap and water in shower  patient will be sent with dressings for home

## 2019-08-22 NOTE — PROGRESS NOTE ADULT - SUBJECTIVE AND OBJECTIVE BOX
Subjective: POD 1 s/p complex back wound closure, doing well this AM, no issues    Exam:  GA: well-appearing  Pulm: breathing comfortably  GI: non-distended  Back: incision c/d/i, Aquacel dressing replaced    Vital Signs Last 24 Hrs  T(C): 36.4 (22 Aug 2019 05:20), Max: 36.7 (21 Aug 2019 22:00)  T(F): 97.5 (22 Aug 2019 05:20), Max: 98.1 (21 Aug 2019 22:00)  HR: 83 (22 Aug 2019 05:20) (60 - 93)  BP: 102/68 (22 Aug 2019 05:20) (99/61 - 126/84)  BP(mean): 70 (21 Aug 2019 22:45) (70 - 92)  RR: 18 (22 Aug 2019 05:20) (10 - 23)  SpO2: 99% (22 Aug 2019 05:20) (99% - 100%)    I&O's Detail    21 Aug 2019 07:01  -  22 Aug 2019 07:00  --------------------------------------------------------  IN:    IV PiggyBack: 50 mL    lactated ringers.: 400 mL    Oral Fluid: 150 mL    sodium chloride 0.9% with potassium chloride 20 mEq/L: 750 mL  Total IN: 1350 mL    OUT:    Voided: 625 mL  Total OUT: 625 mL    Total NET: 725 mL      MEDICATIONS  (STANDING):  buDESOnide 160 MICROgram(s)/formoterol 4.5 MICROgram(s) Inhaler 2 Puff(s) Inhalation two times a day  ceFAZolin   IVPB 2000 milliGRAM(s) IV Intermittent every 8 hours  diazepam    Tablet 10 milliGRAM(s) Oral two times a day  docusate sodium 100 milliGRAM(s) Oral three times a day  loratadine 10 milliGRAM(s) Oral daily  losartan 50 milliGRAM(s) Oral daily  methocarbamol 500 milliGRAM(s) Oral three times a day  montelukast 10 milliGRAM(s) Oral daily  pantoprazole    Tablet 40 milliGRAM(s) Oral before breakfast  senna 2 Tablet(s) Oral at bedtime  sodium chloride 0.9% with potassium chloride 20 mEq/L 1000 milliLiter(s) (75 mL/Hr) IV Continuous <Continuous>  tiotropium 18 MICROgram(s) Capsule 1 Capsule(s) Inhalation daily  verapamil  milliGRAM(s) Oral daily    MEDICATIONS  (PRN):  acetaminophen   Tablet .. 650 milliGRAM(s) Oral every 4 hours PRN Temp greater or equal to 38C (100.4F), Mild Pain (1 - 3)  ALBUTerol    90 MICROgram(s) HFA Inhaler 2 Puff(s) Inhalation every 6 hours PRN Shortness of Breath and/or Wheezing  HYDROmorphone  Injectable 1 milliGRAM(s) IV Push every 15 minutes PRN Severe Pain (7 - 10)  HYDROmorphone  Injectable 1 milliGRAM(s) IV Push every 3 hours PRN Severe Pain (7 - 10)  oxyCODONE    IR 5 milliGRAM(s) Oral every 4 hours PRN Moderate Pain (4 - 6)  oxyCODONE    IR 10 milliGRAM(s) Oral every 4 hours PRN Severe Pain (7 - 10)  temazepam 15 milliGRAM(s) Oral at bedtime PRN Insomnia      LABS:                        10.4   8.74  )-----------( 230      ( 21 Aug 2019 19:42 )             32.9     08-21    139  |  101  |  15  ----------------------------<  111<H>  4.0   |  24  |  1.02    Ca    9.2      21 Aug 2019 19:42

## 2019-08-22 NOTE — PROGRESS NOTE ADULT - SUBJECTIVE AND OBJECTIVE BOX
POD 1 s/p complex back wound closure. Patient states "I feel 300% better today."      Exam  Dressing intact  No collections

## 2019-08-22 NOTE — DISCHARGE NOTE PROVIDER - NSDCCPCAREPLAN_GEN_ALL_CORE_FT
PRINCIPAL DISCHARGE DIAGNOSIS  Diagnosis: Disruption of traumatic injury wound repair  Assessment and Plan of Treatment:

## 2019-08-22 NOTE — DISCHARGE NOTE PROVIDER - NSDCFUSCHEDAPPT_GEN_ALL_CORE_FT
ABHIJIT PETERS ; 09/20/2019 ; NPP Puled [address] [facility] ABHIJIT PETERS ; 09/20/2019 ; NPP Puled [address] [facility]

## 2019-08-22 NOTE — DISCHARGE NOTE PROVIDER - CARE PROVIDERS DIRECT ADDRESSES
[email or web address]

## 2019-08-22 NOTE — DISCHARGE NOTE PROVIDER - CARE PROVIDER_API CALL
Malcolm Skinner)  Neurological Surgery  [address], [address]  [place] [address]  Phone: [phone]  Fax: [phone]  Follow Up Time: 1 week    Maria Guadalupe Castro)  Plastic Surgery  [address] [place] [address]  Phone: [phone]  Fax: [phone]  Follow Up Time: 1 week

## 2019-08-22 NOTE — DISCHARGE NOTE NURSING/CASE MANAGEMENT/SOCIAL WORK - NSDCDPATPORTLINK_GEN_ALL_CORE
You can access the [facility] Patient Portal, offered by [facility], by registering with the following website: [email or web address]

## 2019-08-24 LAB
GRAM STN WND: SIGNIFICANT CHANGE UP
METHOD TYPE: SIGNIFICANT CHANGE UP
ORGANISM # SPEC MICROSCOPIC CNT: SIGNIFICANT CHANGE UP

## 2019-08-25 LAB
-  AMIKACIN: SIGNIFICANT CHANGE UP
-  AMPICILLIN/SULBACTAM: SIGNIFICANT CHANGE UP
-  AMPICILLIN: SIGNIFICANT CHANGE UP
-  AMPICILLIN: SIGNIFICANT CHANGE UP
-  AZTREONAM: SIGNIFICANT CHANGE UP
-  CEFAZOLIN: SIGNIFICANT CHANGE UP
-  CEFEPIME: SIGNIFICANT CHANGE UP
-  CEFOXITIN: SIGNIFICANT CHANGE UP
-  CEFTAZIDIME: SIGNIFICANT CHANGE UP
-  CEFTRIAXONE: SIGNIFICANT CHANGE UP
-  CIPROFLOXACIN: SIGNIFICANT CHANGE UP
-  CIPROFLOXACIN: SIGNIFICANT CHANGE UP
-  ERTAPENEM: SIGNIFICANT CHANGE UP
-  GENTAMICIN: SIGNIFICANT CHANGE UP
-  IMIPENEM: SIGNIFICANT CHANGE UP
-  LEVOFLOXACIN: SIGNIFICANT CHANGE UP
-  MEROPENEM: SIGNIFICANT CHANGE UP
-  PIPERACILLIN/TAZOBACTAM: SIGNIFICANT CHANGE UP
-  TETRACYCLINE: SIGNIFICANT CHANGE UP
-  TIGECYCLINE: SIGNIFICANT CHANGE UP
-  TOBRAMYCIN: SIGNIFICANT CHANGE UP
-  TRIMETHOPRIM/SULFAMETHOXAZOLE: SIGNIFICANT CHANGE UP
-  VANCOMYCIN: SIGNIFICANT CHANGE UP
METHOD TYPE: SIGNIFICANT CHANGE UP

## 2019-09-20 ENCOUNTER — APPOINTMENT (OUTPATIENT)
Dept: PULMONOLOGY | Facility: CLINIC | Age: 54
End: 2019-09-20
Payer: COMMERCIAL

## 2019-09-20 ENCOUNTER — NON-APPOINTMENT (OUTPATIENT)
Age: 54
End: 2019-09-20

## 2019-09-20 VITALS
OXYGEN SATURATION: 98 % | WEIGHT: 181 LBS | HEART RATE: 75 BPM | DIASTOLIC BLOOD PRESSURE: 70 MMHG | HEIGHT: 68 IN | RESPIRATION RATE: 17 BRPM | SYSTOLIC BLOOD PRESSURE: 120 MMHG | BODY MASS INDEX: 27.43 KG/M2

## 2019-09-20 PROBLEM — G47.00 INSOMNIA, UNSPECIFIED: Chronic | Status: ACTIVE | Noted: 2019-08-20

## 2019-09-20 PROCEDURE — 94010 BREATHING CAPACITY TEST: CPT

## 2019-09-20 PROCEDURE — 99214 OFFICE O/P EST MOD 30 MIN: CPT | Mod: 25

## 2019-09-20 NOTE — REVIEW OF SYSTEMS
[Negative] : Pulmonary Hypertension [Dyspnea] : dyspnea [Constipation] : constipation [Back Pain] : ~T back pain [Myalgias] : myalgias [Arthralgias] : arthralgias [As Noted in HPI] : as noted in HPI [Difficulty Initiating Sleep] : difficulty falling asleep [Difficulty Maintaining Sleep] : difficulty maintaining sleep [Postnasal Drip] : no postnasal drip [Nasal Congestion] : no nasal congestion [Sinus Problems] : no sinus problems [Dysphagia] : no dysphagia [Chest Discomfort] : no chest discomfort [Diarrhea] : no diarrhea

## 2019-09-20 NOTE — HISTORY OF PRESENT ILLNESS
[FreeTextEntry1] : Mr. Corrales is 53 year old male with a history of allergic rhinitis, asthma, abnormal chest CT, and GERD presenting to the office today for a follow up visit and preop clearance. His chief complaint is \par -he reports having neck and muscle pain from having slept on it wrong\par -he reports feeling genuinely uncomfortable s/p orthopedic surgery (laminectomy, lumbar fusion, and cleanup of 1st surgery with the help of a plastic surgeon)\par -he notes he had a barbed suture that began to poke through his skin\par -he reports feeling that his muscles feel completely ripped apart \par -he notes Dr Wiggins wants him to have a clarifix (ENT)\par -he notes his whole C spine is in stenosis, herniated, and bulging\par -he notes he gets neuropathy in his hands\par -he notes he has difficulty exercising with even 3-5 pound weights\par -he reports he has been needing to use his Ventolin more often, possibly due to the change in season or inactivity\par -he notes having had an Echo\par -he notes having had an endoscopy last week, which was normal \par -he notes his sinuses are clear\par -he notes he is unable to sleep well due to his pain.\par -he notes he has been feeling constipated due to his medications\par -he denies any chest pain, chest pressure, diarrhea, dysphagia, dizziness, sour taste in the mouth

## 2019-09-20 NOTE — ASSESSMENT
[FreeTextEntry1] : **********************ALL PRINTED SCRIPTS TO BE FILLED THROUGH [facility] FUND*************************\par \par Mr. Corrales has a history of asthma, allergy, GERD, and abnormal CT. He is s/p [facility] exposure. He now presents to the office today for preop clearance for a laminectomy. He is currently stable from a pulmonary perspective. He is post-op complicated spinal surgery.\par \par problem 1: abnormal chest CT - improved \par -? reflecting pneumonia or inflammatory panel- Resolved\par -PPD Quantiferon gold (-), ESR / Hypersensitivity panel (wnl)\par -Follow up Chest CT - 9/2020\par \par problem 2: asthma\par -continue QVar 80 puffs BID\par -continue to use Symbicort 160 at 2 inhalations BID\par -continue Spiriva at 1 inhalation QD \par -continue to use Singulair 10 mg QHS \par -continue to use Ventolin PRN \par \par -Asthma is  believed to be caused by inherited (genetic) and environmental factor, but its exact cause is unknown. Asthma may be triggered by allergens, lung infections, or irritants in the air. Asthma triggers are different for each person\par -Inhaler technique reviewed as well as oral hygiene techniques reviewed with patient. Avoidance of cold air, extremes of temperature, rescue inhaler should be used before exercise. Order of medication reviewed with patient. Recommended use of a cool mist humidifier in the bedroom.\par \par problem 3: cough; ? Sensory neuropathic cough - improved. \par -continue Amitriptyline 10 mg up to TID, prn (DC if able)\par -continue Promethazine DM prn\par Sensory neuropathic cough is an etiology of cough that is often realized once someone has been ruled out for common disease such as: asthma, COPD, eosinophilic bronchitis, bronchiectasis, post nasal drip, and GERD. It sometimes develops following a URI, herpes zoster outbreak in pharynx or thyroid or cervical spine injury. However, many patients have no identifiable antecedent explanation. \par \par problem 4: allergic rhinitis \par -recommended to use "Navage" nasal rinse device \par -add Claritin 10 mg QAM \par -continue to use Xyzal 5 mg QHS\par -continue to use Omnaris 1 sniff/nostril BID\par -continue Olopatadine 1 sniff each nostril BID (.6)\par -follow up with Dr. Ok Islas  - ?surgery\par -Environmental measures for allergies were encouraged including mattress and pillow cover, air purifier, and environmental controls.\par \par problem 5: GERD (FeeSST- c/w LPR)\par -continue to use Dexilant 60 mg before first meal\par -discontinue to use Zantac 300 mg QHS; transition to Pepcid 40 mg QHS \par \par -Rule of 2s: avoid eating too much, eating too late, eating too spicy, eating two hours before bed\par -Things to avoid including overeating, spicy foods, tight clothing, eating within three hours of bed, this list is not all inclusive. \par -For treatment of reflux, possible options discussed including diet control, H2 blockers, PPIs, as well as coating motility agents discussed as treatment options. Timing of meals and proximity of last meal to sleep were discussed. If symptoms persist, a formal gastrointestinal evaluation is needed. \par \par problem 6: insomnia\par -continue to use Ambien PRN \par -recommended good sleep hygiene\par -Good sleep hygiene was encouraged including avoiding watching television an hour before bed, keeping caffeine at a low,  avoiding reading, television, or anything, in bed, no drinking any liquids three hours before bedtime, and only getting into bed when tired and ready for sleep.\par \par problem 7: foot cramps (resolved)\par -continue to use MyoCalm PM\par \par problem 8: health maintenance\par -received flu shot in 2018\par -recommended strep pneumonia vaccines: Prevnar-13 vaccine, followed by Pneumo vaccine 23 on year following\par -recommended early intervention for URIs\par -recommended osteoporosis evaluations\par -recommended early dermatological evaluations\par -recommended after the age of 50 to the age of 70, colonoscopy every 5 years\par -encouraged early intervention\par \par \par F/U in 4 months\par He is encouraged to call with any changes, concerns, or questions. \par \par ***********************ALL PRINTED SCRIPTS TO BE FILLED THROUGH [facility] FUND************************************

## 2019-09-20 NOTE — ADDENDUM
[FreeTextEntry1] : Documented by George Govea acting as a scribe for Dr. Justice Lama on 09/20/2019.\par \par All medical record entries made by the Scribe were at my, Dr. Justice Lama's, direction and personally dictated by me on 09/20/2019. I have reviewed the chart and agree that the record accurately reflects my personal performance of the history, physical exam, assessment and plan. I have also personally directed, reviewed, and agree with the discharge instructions.

## 2019-09-20 NOTE — PHYSICAL EXAM
[General Appearance - Well Developed] : well developed [Normal Appearance] : normal appearance [Well Groomed] : well groomed [General Appearance - Well Nourished] : well nourished [No Deformities] : no deformities [General Appearance - In No Acute Distress] : no acute distress [Normal Conjunctiva] : the conjunctiva exhibited no abnormalities [Eyelids - No Xanthelasma] : the eyelids demonstrated no xanthelasmas [Normal Oropharynx] : normal oropharynx [I] : I [Neck Appearance] : the appearance of the neck was normal [Neck Cervical Mass (___cm)] : no neck mass was observed [Thyroid Diffuse Enlargement] : the thyroid was not enlarged [Jugular Venous Distention Increased] : there was no jugular-venous distention [Thyroid Nodule] : there were no palpable thyroid nodules [Heart Rate And Rhythm] : heart rate and rhythm were normal [Heart Sounds] : normal S1 and S2 [Murmurs] : no murmurs present [Respiration, Rhythm And Depth] : normal respiratory rhythm and effort [Exaggerated Use Of Accessory Muscles For Inspiration] : no accessory muscle use [Auscultation Breath Sounds / Voice Sounds] : lungs were clear to auscultation bilaterally [Abdomen Tenderness] : non-tender [Abdomen Soft] : soft [Abdomen Mass (___ Cm)] : no abdominal mass palpated [Abnormal Walk] : normal gait [Gait - Sufficient For Exercise Testing] : the gait was sufficient for exercise testing [Nail Clubbing] : no clubbing of the fingernails [Cyanosis, Localized] : no localized cyanosis [Petechial Hemorrhages (___cm)] : no petechial hemorrhages [Skin Color & Pigmentation] : normal skin color and pigmentation [] : no rash [Skin Lesions] : no skin lesions [No Venous Stasis] : no venous stasis [No Skin Ulcers] : no skin ulcer [No Xanthoma] : no  xanthoma was observed [Deep Tendon Reflexes (DTR)] : deep tendon reflexes were 2+ and symmetric [Sensation] : the sensory exam was normal to light touch and pinprick [No Focal Deficits] : no focal deficits [Oriented To Time, Place, And Person] : oriented to person, place, and time [Impaired Insight] : insight and judgment were intact [Affect] : the affect was normal [FreeTextEntry1] : I:E ratio 1:3; clear

## 2019-09-20 NOTE — PROCEDURE
[FreeTextEntry1] : PFT revealed normal flows, with a FEV1 of 4.12L, which is 116% of predicted, with a normal flow volume loop \par \par Lumbar CT (7.5.19) reveals Multilevel spondylosis, most pronounced at the L3-4 level where there is moderate to severe central canal stenosis and bilateral foraminal narrowing.\par \par Chest CT (9.6.19) reveals no change prior study dated 9/17/2018. These findings are likely of postinflammatory, or postinfectious etiology.

## 2019-10-21 ENCOUNTER — OUTPATIENT (OUTPATIENT)
Dept: OUTPATIENT SERVICES | Facility: HOSPITAL | Age: 54
LOS: 1 days | End: 2019-10-21
Payer: COMMERCIAL

## 2019-10-21 ENCOUNTER — APPOINTMENT (OUTPATIENT)
Dept: MRI IMAGING | Facility: CLINIC | Age: 54
End: 2019-10-21
Payer: COMMERCIAL

## 2019-10-21 DIAGNOSIS — Z98.84 BARIATRIC SURGERY STATUS: Chronic | ICD-10-CM

## 2019-10-21 DIAGNOSIS — Z98.890 OTHER SPECIFIED POSTPROCEDURAL STATES: Chronic | ICD-10-CM

## 2019-10-21 DIAGNOSIS — Z90.49 ACQUIRED ABSENCE OF OTHER SPECIFIED PARTS OF DIGESTIVE TRACT: Chronic | ICD-10-CM

## 2019-10-21 DIAGNOSIS — Z00.8 ENCOUNTER FOR OTHER GENERAL EXAMINATION: ICD-10-CM

## 2019-10-21 PROCEDURE — 72141 MRI NECK SPINE W/O DYE: CPT | Mod: 26

## 2019-10-21 PROCEDURE — 72141 MRI NECK SPINE W/O DYE: CPT

## 2019-10-29 ENCOUNTER — OUTPATIENT (OUTPATIENT)
Dept: OUTPATIENT SERVICES | Facility: HOSPITAL | Age: 54
LOS: 1 days | End: 2019-10-29
Payer: COMMERCIAL

## 2019-10-29 ENCOUNTER — APPOINTMENT (OUTPATIENT)
Dept: CT IMAGING | Facility: CLINIC | Age: 54
End: 2019-10-29
Payer: COMMERCIAL

## 2019-10-29 DIAGNOSIS — Z98.890 OTHER SPECIFIED POSTPROCEDURAL STATES: Chronic | ICD-10-CM

## 2019-10-29 DIAGNOSIS — M47.816 SPONDYLOSIS WITHOUT MYELOPATHY OR RADICULOPATHY, LUMBAR REGION: ICD-10-CM

## 2019-10-29 DIAGNOSIS — Z90.49 ACQUIRED ABSENCE OF OTHER SPECIFIED PARTS OF DIGESTIVE TRACT: Chronic | ICD-10-CM

## 2019-10-29 DIAGNOSIS — Z98.84 BARIATRIC SURGERY STATUS: Chronic | ICD-10-CM

## 2019-10-29 PROCEDURE — 76376 3D RENDER W/INTRP POSTPROCES: CPT | Mod: 26

## 2019-10-29 PROCEDURE — 72131 CT LUMBAR SPINE W/O DYE: CPT | Mod: 26

## 2019-10-29 PROCEDURE — 76376 3D RENDER W/INTRP POSTPROCES: CPT

## 2019-10-29 PROCEDURE — 72131 CT LUMBAR SPINE W/O DYE: CPT

## 2019-11-27 ENCOUNTER — OUTPATIENT (OUTPATIENT)
Dept: OUTPATIENT SERVICES | Facility: HOSPITAL | Age: 54
LOS: 1 days | End: 2019-11-27
Payer: COMMERCIAL

## 2019-11-27 VITALS
DIASTOLIC BLOOD PRESSURE: 83 MMHG | HEIGHT: 68 IN | RESPIRATION RATE: 16 BRPM | OXYGEN SATURATION: 98 % | HEART RATE: 73 BPM | WEIGHT: 184.97 LBS | TEMPERATURE: 98 F | SYSTOLIC BLOOD PRESSURE: 119 MMHG

## 2019-11-27 DIAGNOSIS — K21.9 GASTRO-ESOPHAGEAL REFLUX DISEASE WITHOUT ESOPHAGITIS: ICD-10-CM

## 2019-11-27 DIAGNOSIS — Z98.890 OTHER SPECIFIED POSTPROCEDURAL STATES: Chronic | ICD-10-CM

## 2019-11-27 DIAGNOSIS — Z29.9 ENCOUNTER FOR PROPHYLACTIC MEASURES, UNSPECIFIED: ICD-10-CM

## 2019-11-27 DIAGNOSIS — J45.20 MILD INTERMITTENT ASTHMA, UNCOMPLICATED: ICD-10-CM

## 2019-11-27 DIAGNOSIS — Z01.818 ENCOUNTER FOR OTHER PREPROCEDURAL EXAMINATION: ICD-10-CM

## 2019-11-27 DIAGNOSIS — M47.12 OTHER SPONDYLOSIS WITH MYELOPATHY, CERVICAL REGION: ICD-10-CM

## 2019-11-27 DIAGNOSIS — Z98.84 BARIATRIC SURGERY STATUS: Chronic | ICD-10-CM

## 2019-11-27 DIAGNOSIS — Z90.49 ACQUIRED ABSENCE OF OTHER SPECIFIED PARTS OF DIGESTIVE TRACT: Chronic | ICD-10-CM

## 2019-11-27 DIAGNOSIS — I10 ESSENTIAL (PRIMARY) HYPERTENSION: ICD-10-CM

## 2019-11-27 DIAGNOSIS — M47.22 OTHER SPONDYLOSIS WITH RADICULOPATHY, CERVICAL REGION: ICD-10-CM

## 2019-11-27 LAB
ANION GAP SERPL CALC-SCNC: 13 MMOL/L — SIGNIFICANT CHANGE UP (ref 5–17)
BLD GP AB SCN SERPL QL: NEGATIVE — SIGNIFICANT CHANGE UP
BUN SERPL-MCNC: 24 MG/DL — HIGH (ref 7–23)
CALCIUM SERPL-MCNC: 9 MG/DL — SIGNIFICANT CHANGE UP (ref 8.4–10.5)
CHLORIDE SERPL-SCNC: 103 MMOL/L — SIGNIFICANT CHANGE UP (ref 96–108)
CO2 SERPL-SCNC: 26 MMOL/L — SIGNIFICANT CHANGE UP (ref 22–31)
CREAT SERPL-MCNC: 1.2 MG/DL — SIGNIFICANT CHANGE UP (ref 0.5–1.3)
GLUCOSE SERPL-MCNC: 84 MG/DL — SIGNIFICANT CHANGE UP (ref 70–99)
MRSA PCR RESULT.: SIGNIFICANT CHANGE UP
POTASSIUM SERPL-MCNC: 4.1 MMOL/L — SIGNIFICANT CHANGE UP (ref 3.5–5.3)
POTASSIUM SERPL-SCNC: 4.1 MMOL/L — SIGNIFICANT CHANGE UP (ref 3.5–5.3)
RH IG SCN BLD-IMP: POSITIVE — SIGNIFICANT CHANGE UP
S AUREUS DNA NOSE QL NAA+PROBE: SIGNIFICANT CHANGE UP
SODIUM SERPL-SCNC: 142 MMOL/L — SIGNIFICANT CHANGE UP (ref 135–145)

## 2019-11-27 PROCEDURE — 86850 RBC ANTIBODY SCREEN: CPT

## 2019-11-27 PROCEDURE — G0463: CPT

## 2019-11-27 PROCEDURE — 80048 BASIC METABOLIC PNL TOTAL CA: CPT

## 2019-11-27 PROCEDURE — 86901 BLOOD TYPING SEROLOGIC RH(D): CPT

## 2019-11-27 PROCEDURE — 86900 BLOOD TYPING SEROLOGIC ABO: CPT

## 2019-11-27 PROCEDURE — 87640 STAPH A DNA AMP PROBE: CPT

## 2019-11-27 RX ORDER — OLOPATADINE HYDROCHLORIDE 665 UG/1
0 SPRAY, METERED NASAL
Qty: 0 | Refills: 0 | DISCHARGE

## 2019-11-27 RX ORDER — TELMISARTAN 20 MG/1
1 TABLET ORAL
Qty: 0 | Refills: 0 | DISCHARGE

## 2019-11-27 RX ORDER — MONTELUKAST 4 MG/1
1 TABLET, CHEWABLE ORAL
Qty: 0 | Refills: 0 | DISCHARGE

## 2019-11-27 RX ORDER — METHOCARBAMOL 500 MG/1
1 TABLET, FILM COATED ORAL
Qty: 0 | Refills: 0 | DISCHARGE

## 2019-11-27 RX ORDER — DEXLANSOPRAZOLE 30 MG/1
1 CAPSULE, DELAYED RELEASE ORAL
Qty: 0 | Refills: 0 | DISCHARGE

## 2019-11-27 RX ORDER — TEMAZEPAM 15 MG/1
1 CAPSULE ORAL
Qty: 0 | Refills: 0 | DISCHARGE

## 2019-11-27 RX ORDER — VERAPAMIL HCL 240 MG
1 CAPSULE, EXTENDED RELEASE PELLETS 24 HR ORAL
Qty: 0 | Refills: 0 | DISCHARGE

## 2019-11-27 RX ORDER — ALBUTEROL 90 UG/1
2 AEROSOL, METERED ORAL
Qty: 0 | Refills: 0 | DISCHARGE

## 2019-11-27 NOTE — H&P PST ADULT - HISTORY OF PRESENT ILLNESS
54 year old male H/O HTN, GERD, obesity s/p gastric by pass 2002, lap banding (2008, Pt refuses to have lap band deflated for surgery ), PTSD, Asthma, lung nodules, insomnia, spondylolisthesis lumbar region, S/P Lumbar 3 laminectomy /fusion  surgery 7/2019 & ID lumbar spine 8/2019 peripheral neuropathy. C/O Cervical pain radiating down left arm for about 2 years. Presents C5-6 anterior cervical discectomy and fusion 12/11/19. 54 year old male H/O HTN, GERD, obesity s/p gastric by pass 2002, lap banding (2008, Pt refuses to have lap band deflated for surgery, advised pt we recommend deflating lap band to prevent aspiration pneumonia ), PTSD, Asthma, lung nodules, insomnia, spondylolisthesis lumbar region, S/P Lumbar 3 laminectomy /fusion  surgery 7/2019 & ID lumbar spine 8/2019 peripheral neuropathy. C/O Cervical pain radiating down left arm for about 2 years. Presents C5-6 anterior cervical discectomy and fusion 12/11/19.

## 2019-11-27 NOTE — H&P PST ADULT - ASSESSMENT
JAMELI VTE 2.0 SCORE [CLOT updated 2019]    AGE RELATED RISK FACTORS                                                       MOBILITY RELATED FACTORS  [x ] Age 41-60 years                                            (1 Point)                    [ ] Bed rest                                                        (1 Point)  [ ] Age: 61-74 years                                           (2 Points)                  [ ] Plaster cast                                                   (2 Points)  [ ] Age= 75 years                                              (3 Points)                    [ ] Bed bound for more than 72 hours                 (2 Points)    DISEASE RELATED RISK FACTORS                                               GENDER SPECIFIC FACTORS  [ ] Edema in the lower extremities                       (1 Point)              [ ] Pregnancy                                                     (1 Point)  [ ] Varicose veins                                               (1 Point)                     [ ] Post-partum < 6 weeks                                   (1 Point)             [x ] BMI > 25 Kg/m2                                            (1 Point)                     [ ] Hormonal therapy  or oral contraception          (1 Point)                 [ ] Sepsis (in the previous month)                        (1 Point)               [ ] History of pregnancy complications                 (1 point)  [ ] Pneumonia or serious lung disease                                               [ ] Unexplained or recurrent                     (1 Point)           (in the previous month)                               (1 Point)  [ ] Abnormal pulmonary function test                     (1 Point)                 SURGERY RELATED RISK FACTORS  [ ] Acute myocardial infarction                              (1 Point)               [ ]  Section                                             (1 Point)  [ ] Congestive heart failure (in the previous month)  (1 Point)      [ ] Minor surgery                                                  (1 Point)   [ ] Inflammatory bowel disease                             (1 Point)               [ ] Arthroscopic surgery                                        (2 Points)  [ ] Central venous access                                      (2 Points)                [x ] General surgery lasting more than 45 minutes (2 points)  [ ] Malignancy- Present or previous                   (2 Points)                [ ] Elective arthroplasty                                         (5 points)    [ ] Stroke (in the previous month)                          (5 Points)                                                                                                                                                           HEMATOLOGY RELATED FACTORS                                                 TRAUMA RELATED RISK FACTORS  [ ] Prior episodes of VTE                                     (3 Points)                [ ] Fracture of the hip, pelvis, or leg                       (5 Points)  [ ] Positive family history for VTE                         (3 Points)             [ ] Acute spinal cord injury (in the previous month)  (5 Points)  [ ] Prothrombin 88786 A                                     (3 Points)               [ ] Paralysis  (less than 1 month)                             (5 Points)  [ ] Factor V Leiden                                             (3 Points)                  [ ] Multiple Trauma within 1 month                        (5 Points)  [ ] Lupus anticoagulants                                     (3 Points)                                                           [ ] Anticardiolipin antibodies                               (3 Points)                                                       [ ] High homocysteine in the blood                      (3 Points)                                             [ ] Other congenital or acquired thrombophilia      (3 Points)                                                [ ] Heparin induced thrombocytopenia                  (3 Points)                                     Total Score [      4    ]

## 2019-11-27 NOTE — H&P PST ADULT - NSICDXPASTSURGICALHX_GEN_ALL_CORE_FT
PAST SURGICAL HISTORY:  Gastric banding status 2008    H/O carpal tunnel repair bilateral w/ ulnar nerve intratment    H/O laminectomy     History of cholecystectomy     History of colonoscopy 5 years ago    History of gastric surgery 1/2002    S/P LASIK surgery PAST SURGICAL HISTORY:  Gastric banding status 2008    H/O carpal tunnel repair bilateral w/ ulnar nerve intratment    H/O laminectomy 7/2019 and fusion, ID lumbar 8/2019    History of cholecystectomy     History of colonoscopy 5 years ago    History of gastric surgery 1/2002    S/P LASIK surgery

## 2019-11-27 NOTE — H&P PST ADULT - PAIN CHRONIC, PROFILE
"  History     Chief Complaint:  Suicidal     HPI   Krzysztof Mills is a 19 year old male who presents to the emergency department today for evaluation of suicidal ideation. The patient reports he \"did something stupid and someone called him in for it.\" He states that he was cutting his wrists and posted a video on [employer] without any caption indicating suicidal ideation and \"someone saw it and called 911.\" He endorses superficial lacerations to the left and right wrist. He denies suicidal ideation and reports concern about missing work tomorrow. He reports history of previous inpatient psychiatric stay, but reports he usually is placed for 3 days then discharged home. He reports he has antidepressant medications at home, but he has not been taking them because they \"do not work.\" DEC states the sister reported the patient has an episode like this once a month where he isolates himself for several days then sends a mass text to multiple family and friends stating \"I love you and I'm going to die\" and he did this tonight. DEC reports when she spoke with the patient, he denies his sister's report. Of note, the patient has no history of striking staff before.      [place] [employer] report that the patient had a hold signed for APS (acute psychiatric services) at [facility] and that they do not know why the paramedics ignored the hold and brought the patient here. [place] [employer] report the patient has all his psychiatric history at [facility] and had his two most previous inpatient stays there as well. Police report the patient has a history of recording and posting videos and pictures with a gun to his head while making suicidal statements and the SWAT team have been involved. Police report tonight the patient texted multiple people saying \"goodbye\" and that he wanted to end his life. Police report the patient also sent a video of himself actively cutting his wrists.    Allergies:  Cephalexin    Medications:    Wellbutrin   Trazodone "     Past Medical History:    Medical history reviewed. No pertinent medical history.    Past Surgical History:    Surgical history reviewed. No pertinent surgical history.    Family History:    Family history reviewed. No pertinent family history.     Social History:  The patient was accompanied to the ED by EMS and [place] Police.  Alcohol Use: Positive  Marital Status:  Single [1]     Review of Systems   Psychiatric/Behavioral: Positive for dysphoric mood (depressed), self-injury and suicidal ideas. Negative for agitation.   All other systems reviewed and are negative.    Physical Exam     Patient Vitals for the past 24 hrs:   BP Temp Temp src Pulse Resp SpO2   02/28/18 2319 (!) 153/97 98.2  F (36.8  C) Oral 92 20 98 %     Physical Exam  General/Appearance: appears stated age, well-groomed, appears comfortable  Eyes: EOMI, no scleral injection, no icterus  ENT: MMM  Neck: supple, nl ROM, no stiffness  Cardiovascular: RRR, nl S1S2, no m/r/g, 2+ pulses in all 4 extremities, cap refill <2sec  Respiratory: CTAB, good air movement throughout, no wheezes/rhonchi/rales, no increased WOB, no retractions  Back: no lesions  GI: abd soft, non-distended, nttp,  no HSM, no rebound, no guarding, nl BS  MSK: HERNANDEZ, good tone, no bony abnormality  Skin: warm and well-perfused, no rash, no edema, no ecchymosis, nl turgor  Neuro: GCS 15, alert and oriented, no gross focal neuro deficits  PSYCH:    Appearance: Casually dressed, appears stated age.     Attitude: Cooperative.     Eye Contact: Minimal    Speech: Regular rate rhythm normal volume and tone    Psychomotor Behavior: Normal    Mood: depressed    Affect: flat    Thought Process: Intact/tangential/flight of ideas    Thought Content: currently denies SI. - HI. - paranoia. - hallucinations    Insight: poor    Judgment: poor    Oriented to: Person place and time.     Attention Span and Concentration: Intact     Recent and Remote Memory: Intact  Heme: no petechia, no purpura, no  "active bleeding    Emergency Department Course     Laboratory:  Laboratory findings were communicated with the patient who voiced understanding of the findings.    Drug abuse screen 77 urine: Negative   CBC: WBC 12.9 (H), HGB 14.1,   BMP: WNL (Creatinine 0.94)  Acetaminophen: <2  Salicylate: <2  Alcohol level blood: <0.01    Emergency Department Course:    2310 I performed an exam of the patient as documented above.     2312 I spoke to DEC, who has already evaluated the patient.     2320 Nursing notes and vitals reviewed.    2350 IV was inserted and blood was drawn for laboratory testing, results above.    Impression & Plan      Medical Decision Making:  Krzysztof Mills is a 19 year old male who presents to the emergency department today for evaluation of suicidal ideation. Tonight he reportedly made a video and posted on [employer] of him cutting his wrists. He texted multiple people saying \"goodbye\" and that he wanted to end his life. Here he denies all this. However, after talking to family members, they are able to confirm that this indeed did happen. He has a history, per police who were involved, of frequently doing things that have involved SWAT team such as posting pictures of him holding a gun to his head. Clinically, he is stating he no longer is suicidal, however I do not feel comfortable with the statement. I do feel he needs to be brought in for further evaluation.  Medically he has been cleared as labs have been unremarkable. The lacerations to his wrists are extremely superficial and do not need further attention.    Diagnosis:    ICD-10-CM    1. Suicidal ideation R45.851      Disposition:   The patient is going to [facility] under Dr Henrietta Tinsley Disclosure:  I, Clara Macedo, am serving as a scribe at 11:05 PM on 2/28/2018 to document services personally performed by Katlyn Cruz MD based on my observations and the provider's statements to me.     EMERGENCY DEPARTMENT     "   Katlyn Cruz MD  03/01/18 0129       Katlyn Cruz MD  03/01/18 0202     yes

## 2019-11-27 NOTE — H&P PST ADULT - NSICDXPROBLEM_GEN_ALL_CORE_FT
PROBLEM DIAGNOSES  Problem: Spondylosis of cervical spine with radiculopathy  Assessment and Plan: C5-6 anterior cervical discectomy and fusion  Check labs, MRSA, MSSA,, T&S    Problem: GERD (gastroesophageal reflux disease)  Assessment and Plan: Continue meds as ordered     Problem: Benign essential HTN  Assessment and Plan: Continue meds as ordered     Problem: Asthma, intermittent  Assessment and Plan: Continue  meds as ordered       Problem: Prophylactic measure  Assessment and Plan: The Caprini score indicates this patient is at risk for a VTE event (score 3-5).  Most surgical patients in this group would benefit from pharmacologic prophylaxis.  The surgical team will determine the balance between VTE risk and bleeding risk

## 2019-11-27 NOTE — H&P PST ADULT - NSICDXPASTMEDICALHX_GEN_ALL_CORE_FT
PAST MEDICAL HISTORY:  Asthma no previous intubation for asthma, reactive airway    H/O neuropathy upper and lower extremities    History of obesity s/p gastric banding    HTN (hypertension)     Insomnia     Lung nodule bilateral    PTSD (post-traumatic stress disorder) [facility] [employer] [facility] survivor PAST MEDICAL HISTORY:  Asthma no previous intubation for asthma, reactive airway    H/O neuropathy upper and lower extremities    History of obesity s/p gastric banding 2007 (Pt refuses to deflate lap band for surgery 12/11/19    HTN (hypertension)     Insomnia     Lung nodule bilateral    PTSD (post-traumatic stress disorder) [facility] survivor PAST MEDICAL HISTORY:  Asthma no previous intubation for asthma, reactive airway    Disc displacement, lumbar     H/O neuropathy upper and lower extremities    History of obesity s/p gastric banding 2007 (Pt refuses to deflate lap band for surgery 12/11/19    HTN (hypertension)     Insomnia     Lung nodule bilateral    Other spondylosis with radiculopathy, cervical region     PTSD (post-traumatic stress disorder) [facility] survivor

## 2019-12-10 ENCOUNTER — TRANSCRIPTION ENCOUNTER (OUTPATIENT)
Age: 54
End: 2019-12-10

## 2019-12-10 PROBLEM — Z86.39 PERSONAL HISTORY OF OTHER ENDOCRINE, NUTRITIONAL AND METABOLIC DISEASE: Chronic | Status: ACTIVE | Noted: 2019-07-08

## 2019-12-10 PROBLEM — M47.22 OTHER SPONDYLOSIS WITH RADICULOPATHY, CERVICAL REGION: Chronic | Status: ACTIVE | Noted: 2019-11-27

## 2019-12-10 PROBLEM — M51.26 OTHER INTERVERTEBRAL DISC DISPLACEMENT, LUMBAR REGION: Chronic | Status: ACTIVE | Noted: 2019-11-27

## 2019-12-11 ENCOUNTER — INPATIENT (INPATIENT)
Facility: HOSPITAL | Age: 54
LOS: 0 days | Discharge: HOME | DRG: 473 | End: 2019-12-12
Attending: NEUROLOGICAL SURGERY | Admitting: NEUROLOGICAL SURGERY
Payer: COMMERCIAL

## 2019-12-11 VITALS
WEIGHT: 184.97 LBS | DIASTOLIC BLOOD PRESSURE: 82 MMHG | OXYGEN SATURATION: 100 % | RESPIRATION RATE: 18 BRPM | SYSTOLIC BLOOD PRESSURE: 123 MMHG | TEMPERATURE: 98 F | HEART RATE: 60 BPM | HEIGHT: 68 IN

## 2019-12-11 DIAGNOSIS — Z98.890 OTHER SPECIFIED POSTPROCEDURAL STATES: Chronic | ICD-10-CM

## 2019-12-11 DIAGNOSIS — M47.22 OTHER SPONDYLOSIS WITH RADICULOPATHY, CERVICAL REGION: ICD-10-CM

## 2019-12-11 DIAGNOSIS — Z90.49 ACQUIRED ABSENCE OF OTHER SPECIFIED PARTS OF DIGESTIVE TRACT: Chronic | ICD-10-CM

## 2019-12-11 DIAGNOSIS — Z98.84 BARIATRIC SURGERY STATUS: Chronic | ICD-10-CM

## 2019-12-11 LAB
ANION GAP SERPL CALC-SCNC: 13 MMOL/L — SIGNIFICANT CHANGE UP (ref 5–17)
BUN SERPL-MCNC: 15 MG/DL — SIGNIFICANT CHANGE UP (ref 7–23)
CALCIUM SERPL-MCNC: 9.4 MG/DL — SIGNIFICANT CHANGE UP (ref 8.4–10.5)
CHLORIDE SERPL-SCNC: 102 MMOL/L — SIGNIFICANT CHANGE UP (ref 96–108)
CO2 SERPL-SCNC: 24 MMOL/L — SIGNIFICANT CHANGE UP (ref 22–31)
CREAT SERPL-MCNC: 1.12 MG/DL — SIGNIFICANT CHANGE UP (ref 0.5–1.3)
GLUCOSE SERPL-MCNC: 109 MG/DL — HIGH (ref 70–99)
HCT VFR BLD CALC: 44.5 % — SIGNIFICANT CHANGE UP (ref 39–50)
HGB BLD-MCNC: 14.1 G/DL — SIGNIFICANT CHANGE UP (ref 13–17)
MAGNESIUM SERPL-MCNC: 2.3 MG/DL — SIGNIFICANT CHANGE UP (ref 1.6–2.6)
MCHC RBC-ENTMCNC: 24.7 PG — LOW (ref 27–34)
MCHC RBC-ENTMCNC: 31.7 GM/DL — LOW (ref 32–36)
MCV RBC AUTO: 77.8 FL — LOW (ref 80–100)
NRBC # BLD: 0 /100 WBCS — SIGNIFICANT CHANGE UP (ref 0–0)
PHOSPHATE SERPL-MCNC: 3.1 MG/DL — SIGNIFICANT CHANGE UP (ref 2.5–4.5)
PLATELET # BLD AUTO: 212 K/UL — SIGNIFICANT CHANGE UP (ref 150–400)
POTASSIUM SERPL-MCNC: 4.7 MMOL/L — SIGNIFICANT CHANGE UP (ref 3.5–5.3)
POTASSIUM SERPL-SCNC: 4.7 MMOL/L — SIGNIFICANT CHANGE UP (ref 3.5–5.3)
RBC # BLD: 5.72 M/UL — SIGNIFICANT CHANGE UP (ref 4.2–5.8)
RBC # FLD: 23.8 % — HIGH (ref 10.3–14.5)
SODIUM SERPL-SCNC: 139 MMOL/L — SIGNIFICANT CHANGE UP (ref 135–145)
WBC # BLD: 5.8 K/UL — SIGNIFICANT CHANGE UP (ref 3.8–10.5)
WBC # FLD AUTO: 5.8 K/UL — SIGNIFICANT CHANGE UP (ref 3.8–10.5)

## 2019-12-11 RX ORDER — LIDOCAINE HCL 20 MG/ML
0.2 VIAL (ML) INJECTION ONCE
Refills: 0 | Status: DISCONTINUED | OUTPATIENT
Start: 2019-12-11 | End: 2019-12-11

## 2019-12-11 RX ORDER — ACETAMINOPHEN 500 MG
650 TABLET ORAL EVERY 6 HOURS
Refills: 0 | Status: DISCONTINUED | OUTPATIENT
Start: 2019-12-11 | End: 2019-12-12

## 2019-12-11 RX ORDER — TIOTROPIUM BROMIDE 18 UG/1
1 CAPSULE ORAL; RESPIRATORY (INHALATION) DAILY
Refills: 0 | Status: DISCONTINUED | OUTPATIENT
Start: 2019-12-11 | End: 2019-12-12

## 2019-12-11 RX ORDER — SODIUM CHLORIDE 9 MG/ML
3 INJECTION INTRAMUSCULAR; INTRAVENOUS; SUBCUTANEOUS EVERY 8 HOURS
Refills: 0 | Status: DISCONTINUED | OUTPATIENT
Start: 2019-12-11 | End: 2019-12-11

## 2019-12-11 RX ORDER — VERAPAMIL HCL 240 MG
240 CAPSULE, EXTENDED RELEASE PELLETS 24 HR ORAL AT BEDTIME
Refills: 0 | Status: DISCONTINUED | OUTPATIENT
Start: 2019-12-11 | End: 2019-12-12

## 2019-12-11 RX ORDER — CEFAZOLIN SODIUM 1 G
2000 VIAL (EA) INJECTION ONCE
Refills: 0 | Status: DISCONTINUED | OUTPATIENT
Start: 2019-12-11 | End: 2019-12-12

## 2019-12-11 RX ORDER — SENNA PLUS 8.6 MG/1
2 TABLET ORAL AT BEDTIME
Refills: 0 | Status: DISCONTINUED | OUTPATIENT
Start: 2019-12-11 | End: 2019-12-12

## 2019-12-11 RX ORDER — SODIUM CHLORIDE 9 MG/ML
1000 INJECTION INTRAMUSCULAR; INTRAVENOUS; SUBCUTANEOUS
Refills: 0 | Status: DISCONTINUED | OUTPATIENT
Start: 2019-12-11 | End: 2019-12-12

## 2019-12-11 RX ORDER — ACETAMINOPHEN 500 MG
1000 TABLET ORAL ONCE
Refills: 0 | Status: COMPLETED | OUTPATIENT
Start: 2019-12-11 | End: 2019-12-11

## 2019-12-11 RX ORDER — ACETAMINOPHEN 500 MG
1000 TABLET ORAL ONCE
Refills: 0 | Status: COMPLETED | OUTPATIENT
Start: 2019-12-11 | End: 2019-12-12

## 2019-12-11 RX ORDER — CHLORHEXIDINE GLUCONATE 213 G/1000ML
1 SOLUTION TOPICAL ONCE
Refills: 0 | Status: DISCONTINUED | OUTPATIENT
Start: 2019-12-11 | End: 2019-12-11

## 2019-12-11 RX ORDER — DIAZEPAM 5 MG
20 TABLET ORAL ONCE
Refills: 0 | Status: DISCONTINUED | OUTPATIENT
Start: 2019-12-11 | End: 2019-12-11

## 2019-12-11 RX ORDER — HYDROMORPHONE HYDROCHLORIDE 2 MG/ML
1 INJECTION INTRAMUSCULAR; INTRAVENOUS; SUBCUTANEOUS
Refills: 0 | Status: DISCONTINUED | OUTPATIENT
Start: 2019-12-11 | End: 2019-12-12

## 2019-12-11 RX ORDER — FAMOTIDINE 10 MG/ML
20 INJECTION INTRAVENOUS DAILY
Refills: 0 | Status: DISCONTINUED | OUTPATIENT
Start: 2019-12-11 | End: 2019-12-12

## 2019-12-11 RX ORDER — HYDROMORPHONE HYDROCHLORIDE 2 MG/ML
0.5 INJECTION INTRAMUSCULAR; INTRAVENOUS; SUBCUTANEOUS
Refills: 0 | Status: DISCONTINUED | OUTPATIENT
Start: 2019-12-11 | End: 2019-12-12

## 2019-12-11 RX ORDER — CYCLOBENZAPRINE HYDROCHLORIDE 10 MG/1
5 TABLET, FILM COATED ORAL EVERY 8 HOURS
Refills: 0 | Status: DISCONTINUED | OUTPATIENT
Start: 2019-12-11 | End: 2019-12-11

## 2019-12-11 RX ORDER — OXYCODONE HYDROCHLORIDE 5 MG/1
5 TABLET ORAL EVERY 4 HOURS
Refills: 0 | Status: DISCONTINUED | OUTPATIENT
Start: 2019-12-11 | End: 2019-12-12

## 2019-12-11 RX ORDER — BUDESONIDE AND FORMOTEROL FUMARATE DIHYDRATE 160; 4.5 UG/1; UG/1
2 AEROSOL RESPIRATORY (INHALATION)
Refills: 0 | Status: DISCONTINUED | OUTPATIENT
Start: 2019-12-11 | End: 2019-12-12

## 2019-12-11 RX ORDER — CEFAZOLIN SODIUM 1 G
2000 VIAL (EA) INJECTION EVERY 8 HOURS
Refills: 0 | Status: DISCONTINUED | OUTPATIENT
Start: 2019-12-12 | End: 2019-12-12

## 2019-12-11 RX ORDER — LOSARTAN POTASSIUM 100 MG/1
50 TABLET, FILM COATED ORAL DAILY
Refills: 0 | Status: DISCONTINUED | OUTPATIENT
Start: 2019-12-11 | End: 2019-12-12

## 2019-12-11 RX ORDER — CYCLOBENZAPRINE HYDROCHLORIDE 10 MG/1
10 TABLET, FILM COATED ORAL THREE TIMES A DAY
Refills: 0 | Status: DISCONTINUED | OUTPATIENT
Start: 2019-12-11 | End: 2019-12-12

## 2019-12-11 RX ORDER — ONDANSETRON 8 MG/1
4 TABLET, FILM COATED ORAL
Refills: 0 | Status: DISCONTINUED | OUTPATIENT
Start: 2019-12-11 | End: 2019-12-12

## 2019-12-11 RX ADMIN — BUDESONIDE AND FORMOTEROL FUMARATE DIHYDRATE 2 PUFF(S): 160; 4.5 AEROSOL RESPIRATORY (INHALATION) at 21:18

## 2019-12-11 RX ADMIN — Medication 1000 MILLIGRAM(S): at 12:15

## 2019-12-11 RX ADMIN — Medication 1000 MILLIGRAM(S): at 12:45

## 2019-12-11 RX ADMIN — Medication 240 MILLIGRAM(S): at 21:18

## 2019-12-11 RX ADMIN — CYCLOBENZAPRINE HYDROCHLORIDE 10 MILLIGRAM(S): 10 TABLET, FILM COATED ORAL at 21:01

## 2019-12-11 RX ADMIN — FAMOTIDINE 20 MILLIGRAM(S): 10 INJECTION INTRAVENOUS at 21:10

## 2019-12-11 RX ADMIN — SODIUM CHLORIDE 75 MILLILITER(S): 9 INJECTION INTRAMUSCULAR; INTRAVENOUS; SUBCUTANEOUS at 21:54

## 2019-12-11 RX ADMIN — LOSARTAN POTASSIUM 50 MILLIGRAM(S): 100 TABLET, FILM COATED ORAL at 21:18

## 2019-12-11 RX ADMIN — TIOTROPIUM BROMIDE 1 CAPSULE(S): 18 CAPSULE ORAL; RESPIRATORY (INHALATION) at 21:19

## 2019-12-11 RX ADMIN — Medication 20 MILLIGRAM(S): at 12:15

## 2019-12-11 RX ADMIN — HYDROMORPHONE HYDROCHLORIDE 0.5 MILLIGRAM(S): 2 INJECTION INTRAMUSCULAR; INTRAVENOUS; SUBCUTANEOUS at 20:59

## 2019-12-11 RX ADMIN — HYDROMORPHONE HYDROCHLORIDE 0.5 MILLIGRAM(S): 2 INJECTION INTRAMUSCULAR; INTRAVENOUS; SUBCUTANEOUS at 20:51

## 2019-12-11 NOTE — PATIENT PROFILE ADULT - PRIMARY SOURCE OF SUPPORT/COMFORT
friend/child(jimmy)/spouse Split-Thickness Skin Graft Text: The defect edges were debeveled with a #15 scalpel blade.  Given the location of the defect, shape of the defect and the proximity to free margins a split thickness skin graft was deemed most appropriate.  Using a sterile surgical marker, the primary defect shape was transferred to the donor site. The split thickness graft was then harvested.  The skin graft was then placed in the primary defect and oriented appropriately.

## 2019-12-11 NOTE — PROGRESS NOTE ADULT - SUBJECTIVE AND OBJECTIVE BOX
SUMMARY:  C5-6 ACDF    ADMISSION SCORES:   GCS: 15 HH: MF: NIHSS: ICH Score:    REVIEW OF SYSTEMS: REVIEW OF SYSTEMS: [] Unable to Assess due to neurologic exam   [x ] All ROS addressed below are non-contributory, except:  Neuro: [ ] Headache [ ] Back pain [ ] Numbness [ ] Weakness [ ] Ataxia [ ] Dizziness [ ] Aphasia [ ] Dysarthria [ ] Visual disturbance  Resp: [ ] Shortness of breath/dyspnea [ ] Orthopnea [ ] Cough  CV: [ ] Chest pain [ ] Palpitation [ ] Lightheadedness [ ] Syncope  Renal: [ ] Thirst [ ] Edema  GI: [ ] Nausea [ ] Emesis [ ] Abdominal pain [ ] Constipation [ ] Diarrhea  Hem: [ ] Hematemesis [ ] bBright red blood per rectum  ID: [ ] Fever [ ] Chills [ ] Dysuria  ENT: [ ] Rhinorrhea    VITALS: [x] Reviewed    IMAGING/DATA: [x] Reviewed    IVF FLUIDS/MEDICATIONS: [x] Reviewed    ALLERGIES: Allergies    No Known Allergies    Intolerances        DEVICES:   [] Restraints [x] PIVs [] ET tube [] central line [] PICC [] arterial line [] li [] NGT/OGT [] EVD [] LD [] KAREN/HMV [] LiCOX [] ICP monitor [] Trach [] PEG [] Chest Tube [] other:    EXAMINATION:  General: No acute distress  HEENT: Anicteric sclerae  Cardiac: [id number]  Lungs: Clear  Abdomen: Soft, non-tender, +BS  Extremities: No c/c/e  Skin/Incision Site: Clean, dry and intact  Neurologic: Awake, alert, fully oriented, follows commands, PERRL, VFFtc, EOMI, face symmetric, tongue midline, no drift, full strength

## 2019-12-11 NOTE — PROGRESS NOTE ADULT - ASSESSMENT
C5-6 ACDF    tolerating PO  incision d/i, no neck hematoma  stable respiratory status   maintain -160  PT/OT/dispo plan  maintain euglycemia  chemoppx post op day1  perio antibiotics, monitor for fevers  stable for transfer to floor  not critically ill

## 2019-12-11 NOTE — CHART NOTE - NSCHARTNOTEFT_GEN_A_CORE
CAPRINI SCORE [CLOT] Score on Admission for     AGE RELATED RISK FACTORS                                                       MOBILITY RELATED FACTORS  [ x] Age 41-60 years                                            (1 Point)                  [ ] Bed rest                                                        (1 Point)  [ ] Age: 61-74 years                                           (2 Points)                 [ ] Plaster cast                                                   (2 Points)  [ ] Age= 75 years                                              (3 Points)                 [ ] Bed bound for more than 72 hours                 (2 Points)    DISEASE RELATED RISK FACTORS                                               GENDER SPECIFIC FACTORS  [ ] Edema in the lower extremities                       (1 Point)                  [ ] Pregnancy                                                     (1 Point)  [ ] Varicose veins                                               (1 Point)                  [ ] Post-partum < 6 weeks                                   (1 Point)             [x ] BMI > 25 Kg/m2                                            (1 Point)                  [ ] Hormonal therapy  or oral contraception          (1 Point)                 [ ] Sepsis (in the previous month)                        (1 Point)                  [ ] History of pregnancy complications                 (1 point)  [ ] Pneumonia or serious lung disease                                               [ ] Unexplained or recurrent                     (1 Point)           (in the previous month)                               (1 Point)  [ ] Abnormal pulmonary function test                     (1 Point)                 SURGERY RELATED RISK FACTORS (include planned surgeries)  [ ] Acute myocardial infarction                              (1 Point)                 [ ]  Section                                             (1 Point)  [ ] Congestive heart failure (in the previous month)  (1 Point)         [ ] Minor surgery                                                  (1 Point)   [ ] Inflammatory bowel disease                             (1 Point)                 [ ] Arthroscopic surgery                                        (2 Points)  [ ] Central venous access                                      (2 Points)                [ x] General surgery lasting more than 45 minutes   (2 Points)       [ ] Stroke (in the previous month)                          (5 Points)               [ ] Elective arthroplasty                                         (5 Points)            [ ] current or past malignancy                              (2 Points)                                                                                                       HEMATOLOGY RELATED FACTORS                                                 TRAUMA RELATED RISK FACTORS  [ ] Prior episodes of VTE                                     (3 Points)                [ ] Fracture of the hip, pelvis, or leg                       (5 Points)  [ ] Positive family history for VTE                         (3 Points)                 [ ] Acute spinal cord injury (in the previous month)  (5 Points)  [ ] Prothrombin 90818 A                                     (3 Points)                 [ ] Paralysis  (less than 1 month)                             (5 Points)  [ ] Factor V Leiden                                             (3 Points)                  [ ] Multiple Trauma within 1 month                        (5 Points)  [ ] Lupus anticoagulants                                     (3 Points)                                                           [ ] Anticardiolipin antibodies                               (3 Points)                                                       [ ] High homocysteine in the blood                      (3 Points)                                             [ ] Other congenital or acquired thrombophilia      (3 Points)                                                [ ] Heparin induced thrombocytopenia                  (3 Points)                                          Total Score [     4     ]    Risk:  Very low 0   Low 1 to 2   Moderate 3 to 4   High =5       VTE Prophylasix Recommednations:  [x ] mechanical pneumatic compression devices                                      [ ] contraindicated: _____________________  [ ] chemo prophylasix                                                                                   [x ] contraindicated ___bleeding risk__________________    **** HIGH LIKELIHOOD DVT PRESENT ON ADMISSION  [ ] (please order LE dopplers within 24 hours of admission)

## 2019-12-12 ENCOUNTER — TRANSCRIPTION ENCOUNTER (OUTPATIENT)
Age: 54
End: 2019-12-12

## 2019-12-12 VITALS — DIASTOLIC BLOOD PRESSURE: 76 MMHG | OXYGEN SATURATION: 98 % | SYSTOLIC BLOOD PRESSURE: 128 MMHG | HEART RATE: 79 BPM

## 2019-12-12 LAB
ANION GAP SERPL CALC-SCNC: 14 MMOL/L — SIGNIFICANT CHANGE UP (ref 5–17)
BUN SERPL-MCNC: 15 MG/DL — SIGNIFICANT CHANGE UP (ref 7–23)
CALCIUM SERPL-MCNC: 9 MG/DL — SIGNIFICANT CHANGE UP (ref 8.4–10.5)
CHLORIDE SERPL-SCNC: 100 MMOL/L — SIGNIFICANT CHANGE UP (ref 96–108)
CO2 SERPL-SCNC: 21 MMOL/L — LOW (ref 22–31)
CREAT SERPL-MCNC: 0.91 MG/DL — SIGNIFICANT CHANGE UP (ref 0.5–1.3)
GLUCOSE SERPL-MCNC: 135 MG/DL — HIGH (ref 70–99)
HCT VFR BLD CALC: 42.1 % — SIGNIFICANT CHANGE UP (ref 39–50)
HGB BLD-MCNC: 13.7 G/DL — SIGNIFICANT CHANGE UP (ref 13–17)
MAGNESIUM SERPL-MCNC: 2 MG/DL — SIGNIFICANT CHANGE UP (ref 1.6–2.6)
MCHC RBC-ENTMCNC: 24.9 PG — LOW (ref 27–34)
MCHC RBC-ENTMCNC: 32.5 GM/DL — SIGNIFICANT CHANGE UP (ref 32–36)
MCV RBC AUTO: 76.5 FL — LOW (ref 80–100)
NRBC # BLD: 0 /100 WBCS — SIGNIFICANT CHANGE UP (ref 0–0)
PHOSPHATE SERPL-MCNC: 4.2 MG/DL — SIGNIFICANT CHANGE UP (ref 2.5–4.5)
PLATELET # BLD AUTO: 204 K/UL — SIGNIFICANT CHANGE UP (ref 150–400)
POTASSIUM SERPL-MCNC: 4.1 MMOL/L — SIGNIFICANT CHANGE UP (ref 3.5–5.3)
POTASSIUM SERPL-SCNC: 4.1 MMOL/L — SIGNIFICANT CHANGE UP (ref 3.5–5.3)
RBC # BLD: 5.5 M/UL — SIGNIFICANT CHANGE UP (ref 4.2–5.8)
RBC # FLD: 23.1 % — HIGH (ref 10.3–14.5)
SODIUM SERPL-SCNC: 135 MMOL/L — SIGNIFICANT CHANGE UP (ref 135–145)
WBC # BLD: 7.74 K/UL — SIGNIFICANT CHANGE UP (ref 3.8–10.5)
WBC # FLD AUTO: 7.74 K/UL — SIGNIFICANT CHANGE UP (ref 3.8–10.5)

## 2019-12-12 PROCEDURE — 76000 FLUOROSCOPY <1 HR PHYS/QHP: CPT

## 2019-12-12 PROCEDURE — 85027 COMPLETE CBC AUTOMATED: CPT

## 2019-12-12 PROCEDURE — 84100 ASSAY OF PHOSPHORUS: CPT

## 2019-12-12 PROCEDURE — 83735 ASSAY OF MAGNESIUM: CPT

## 2019-12-12 PROCEDURE — 94640 AIRWAY INHALATION TREATMENT: CPT

## 2019-12-12 PROCEDURE — 99232 SBSQ HOSP IP/OBS MODERATE 35: CPT

## 2019-12-12 PROCEDURE — C1889: CPT

## 2019-12-12 PROCEDURE — 97161 PT EVAL LOW COMPLEX 20 MIN: CPT

## 2019-12-12 PROCEDURE — C1713: CPT

## 2019-12-12 PROCEDURE — 80048 BASIC METABOLIC PNL TOTAL CA: CPT

## 2019-12-12 RX ORDER — OXYCODONE HYDROCHLORIDE 5 MG/1
1 TABLET ORAL
Qty: 20 | Refills: 0
Start: 2019-12-12

## 2019-12-12 RX ORDER — SENNA PLUS 8.6 MG/1
2 TABLET ORAL
Qty: 0 | Refills: 0 | DISCHARGE
Start: 2019-12-12

## 2019-12-12 RX ORDER — DIAZEPAM 5 MG
20 TABLET ORAL
Qty: 0 | Refills: 0 | DISCHARGE

## 2019-12-12 RX ORDER — METHOCARBAMOL 500 MG/1
500 TABLET, FILM COATED ORAL EVERY 8 HOURS
Refills: 0 | Status: DISCONTINUED | OUTPATIENT
Start: 2019-12-12 | End: 2019-12-12

## 2019-12-12 RX ORDER — METHOCARBAMOL 500 MG/1
1 TABLET, FILM COATED ORAL
Qty: 0 | Refills: 0 | DISCHARGE
Start: 2019-12-12

## 2019-12-12 RX ADMIN — Medication 1000 MILLIGRAM(S): at 02:21

## 2019-12-12 RX ADMIN — Medication 400 MILLIGRAM(S): at 01:30

## 2019-12-12 RX ADMIN — OXYCODONE HYDROCHLORIDE 5 MILLIGRAM(S): 5 TABLET ORAL at 04:53

## 2019-12-12 RX ADMIN — OXYCODONE HYDROCHLORIDE 5 MILLIGRAM(S): 5 TABLET ORAL at 05:30

## 2019-12-12 RX ADMIN — Medication 100 MILLIGRAM(S): at 06:17

## 2019-12-12 NOTE — PROGRESS NOTE ADULT - SUBJECTIVE AND OBJECTIVE BOX
HPI:  Patient is a 54 year old male with prior spine surgeries who developed left upper extremity radiculopathy.  Now presented for surgery.    OVERNIGHT EVENTS:  No acute events overnight.  Incisional pain well controlled on current regimen.  Tolerating diet (solids and liquids) with no difficulties.  Has been out of bed and ambulating.    Vital Signs Last 24 Hrs  T(C): 36.5 (12 Dec 2019 07:00), Max: 36.7 (11 Dec 2019 11:39)  T(F): 97.7 (12 Dec 2019 07:00), Max: 98.1 (11 Dec 2019 11:39)  HR: 79 (12 Dec 2019 09:41) (53 - 89)  BP: 128/76 (12 Dec 2019 09:41) (110/75 - 135/77)  BP(mean): 102 (12 Dec 2019 00:00) (89 - 102)  RR: 16 (12 Dec 2019 07:00) (15 - 18)  SpO2: 98% (12 Dec 2019 09:41) (95% - 100%)          PHYSICAL EXAM:  Neurological: awake, alert, oriented x3, follows commands, speech clear and fluent, no dysphonia, moves all extremities x4 w/ 5/5 strength throughout, sensation present, intact, equal throughout    Cardiovascular: +s1, s2  Respiratory: clear to auscultation b/l  Gastrointestinal: soft, non-distended, non-tender  Genitourinary: +voiding  Incision/Wound: left anterior neck mildly blood tinged incision dry and intact w/ steri strips    TUBES/LINES:  [x] none    DIET:  [x] regular    LABS:                        13.7   7.74  )-----------( 204      ( 12 Dec 2019 06:44 )             42.1     12-12    135  |  100  |  15  ----------------------------<  135<H>  4.1   |  21<L>  |  0.91    Ca    9.0      12 Dec 2019 06:41  Phos  4.2     12-12  Mg     2.0     12-12        Allergies    No Known Allergies        MEDICATIONS:  Antibiotics:  ceFAZolin   IVPB 2000 milliGRAM(s) IV Intermittent once  ceFAZolin   IVPB 2000 milliGRAM(s) IV Intermittent every 8 hours    Neuro:  acetaminophen   Tablet .. 650 milliGRAM(s) Oral every 6 hours PRN  cyclobenzaprine 10 milliGRAM(s) Oral three times a day PRN  ondansetron Injectable 4 milliGRAM(s) IV Push every 2 hours PRN  oxyCODONE    IR 5 milliGRAM(s) Oral every 4 hours PRN    Anticoagulation:  none    OTHER:  budesonide 160 MICROgram(s)/formoterol 4.5 MICROgram(s) Inhaler 2 Puff(s) Inhalation two times a day  famotidine    Tablet 20 milliGRAM(s) Oral daily  losartan 50 milliGRAM(s) Oral daily  senna 2 Tablet(s) Oral at bedtime  tiotropium 18 MICROgram(s) Capsule 1 Capsule(s) Inhalation daily  verapamil  milliGRAM(s) Oral at bedtime    IVF:  sodium chloride 0.9%. 1000 milliLiter(s) IV Continuous <Continuous>    CULTURES:  none    RADIOLOGY & ADDITIONAL TESTS:  no new imaging

## 2019-12-12 NOTE — PHYSICAL THERAPY INITIAL EVALUATION ADULT - GENERAL OBSERVATIONS, REHAB EVAL
Pt rec'd sitting EOB in care of michelle Georges, +IVF, eager to participate in PT initial evaluation

## 2019-12-12 NOTE — DISCHARGE NOTE PROVIDER - NSDCACTIVITY_GEN_ALL_CORE
Walking - Indoors allowed/Do not drive or operate machinery/No heavy lifting/straining/Do not make important decisions

## 2019-12-12 NOTE — DISCHARGE NOTE PROVIDER - NSDCCPCAREPLAN_GEN_ALL_CORE_FT
PRINCIPAL DISCHARGE DIAGNOSIS  Diagnosis: Left cervical radiculopathy  Assessment and Plan of Treatment: s/p c5-c6 anterior cervical discectomy and fusion on 12/11/2019  No strenuous activity.  No lifting.  Do not return to work until cleared to do so by physician.  No driving until cleared to do so by physician.  keep incisions clean and dry. do not get incision wet until post op day #4. do not soak in water. no baths. pat dry. do not apply cream/moisture to incisions.  items for follow up: wound check  follow up with neurosurgery, Dr. Skinner, on 12/20/2019 at 9:00 am,  [address] [facility], [address], [facility], [address]  follow up with PMD within 1 week of discharge PRINCIPAL DISCHARGE DIAGNOSIS  Diagnosis: Left cervical radiculopathy  Assessment and Plan of Treatment: s/p c5-c6 anterior cervical discectomy and fusion on 12/11/2019  No strenuous activity.  No lifting.  Do not return to work until cleared to do so by physician.  No driving until cleared to do so by physician.  keep incisions clean and dry. do not get incision wet until post op day #4. do not soak in water. no baths. pat dry. do not apply cream/moisture to incisions.  items for follow up: wound check  follow up with neurosurgery, Dr. Skinner, on 12/20/2019 at 9:00 am,  [address] [facility] [facility], [address], [facility], [address]  follow up with PMD within 1 week of discharge

## 2019-12-12 NOTE — DISCHARGE NOTE PROVIDER - CARE PROVIDER_API CALL
Malcolm Skinner)  Neurological Surgery  [address] [place] [address], [address]  [place] [address]  Phone: [phone]  Fax: [phone]  Follow Up Time: 1 week

## 2019-12-12 NOTE — DISCHARGE NOTE PROVIDER - NSDCFUADDINST_GEN_ALL_CORE_FT
s/p c5-c6 anterior cervical discectomy and fusion on 12/11/2019  No strenuous activity.  No lifting.  Do not return to work until cleared to do so by physician.  No driving until cleared to do so by physician.  keep incisions clean and dry. do not get incision wet until post op day #4. do not soak in water. no baths. pat dry. do not apply cream/moisture to incisions.  items for follow up: wound check  follow up with neurosurgery, Dr. Skinner, on 12/20/2019 at 9:00 am,  [address], [address], [facility], [address]  follow up with PMD within 1 week of discharge

## 2019-12-12 NOTE — DISCHARGE NOTE PROVIDER - NSDCMRMEDTOKEN_GEN_ALL_CORE_FT
Claritin 10 mg oral tablet: 1 tab(s) orally once a day (at bedtime)  Dexilant 60 mg oral delayed release capsule: 1 cap(s) orally once a day (at bedtime)  famotidine 40 mg oral tablet: 1 tab(s) orally once a day (at bedtime)  ferrous sulfate 325 mg (65 mg elemental iron) oral tablet: 3 tab(s) orally once a day, next day 1 tab alternate each day  folic acid 1 mg oral tablet: 1 tab(s) orally once a day  oxycodone-acetaminophen 5 mg-325 mg oral tablet: 1 tab(s) orally every 8 hours, As Needed  Rozerem 8 mg oral tablet: 1 tab(s) orally once a day (at bedtime), As Needed  Spiriva 18 mcg inhalation capsule: 1 cap(s) inhaled once a day  Symbicort 160 mcg-4.5 mcg/inh inhalation aerosol: 2 puff(s) inhaled once a day  telmisartan 40 mg oral tablet: 1 tab(s) orally once a day (at bedtime)  Valium: 20 milligram(s) orally once a day (at bedtime), As Needed  verapamil 240 mg/24 hours oral tablet, extended release: 1 tab(s) orally once a day (at bedtime)  Xopenex HFA 45 mcg/inh inhalation aerosol: 2 puff(s) inhaled every 4 hours, As Needed  Xyzal 5 mg oral tablet: 1 tab(s) orally once a day (in the evening) Claritin 10 mg oral tablet: 1 tab(s) orally once a day (at bedtime)  Dexilant 60 mg oral delayed release capsule: 1 cap(s) orally once a day (at bedtime)  famotidine 40 mg oral tablet: 1 tab(s) orally once a day (at bedtime)  ferrous sulfate 325 mg (65 mg elemental iron) oral tablet: 3 tab(s) orally once a day, next day 1 tab alternate each day  folic acid 1 mg oral tablet: 1 tab(s) orally once a day  methocarbamol 500 mg oral tablet: 1 tab(s) orally every 8 hours, As needed, muscle spasms  oxyCODONE 5 mg oral tablet: 1 tab(s) orally every 4 hours, As needed, Moderate Pain (4 - 6) MDD:four tabs  Rozerem 8 mg oral tablet: 1 tab(s) orally once a day (at bedtime), As Needed  senna oral tablet: 2 tab(s) orally once a day (at bedtime)  Spiriva 18 mcg inhalation capsule: 1 cap(s) inhaled once a day  Symbicort 160 mcg-4.5 mcg/inh inhalation aerosol: 2 puff(s) inhaled once a day  telmisartan 40 mg oral tablet: 1 tab(s) orally once a day (at bedtime)  verapamil 240 mg/24 hours oral tablet, extended release: 1 tab(s) orally once a day (at bedtime)  Xopenex HFA 45 mcg/inh inhalation aerosol: 2 puff(s) inhaled every 4 hours, As Needed  Xyzal 5 mg oral tablet: 1 tab(s) orally once a day (in the evening)

## 2019-12-12 NOTE — DISCHARGE NOTE PROVIDER - HOSPITAL COURSE
Patient is a 54 year old male with prior spine surgeries who developed left upper extremity radiculopathy.  Now presented for surgery.  Underwent c5-c6 anterior cervical discectomy and fusion on 12/11/2019.  Tolerated procedure well with no immediate complications.  Admitted to the neurosurgery service for routine post op care.  Oxycodone used for pain control.  Patient tolerating diet (solids and liquids) with no difficulties.  Has been out of bed.  Physical therapy evaluated patient and said no needs upon discharge.  Stable for discharge. Patient is a 54 year old male with prior spine surgeries who developed left upper extremity radiculopathy.  Now presented for surgery.  Underwent c5-c6 anterior cervical discectomy and fusion on 12/11/2019.  Tolerated procedure well with no immediate complications.  Admitted to the neurosurgery service for routine post op care.  Oxycodone used for pain control.  Patient tolerating diet (solids and liquids) with no difficulties.  Has been out of bed.  Physical therapy evaluated patient and said no needs upon discharge.  Stable for discharge.        Pt did well after surgery, with improvement in neck and right shoulder pain. Patient is a 54 year old male with prior spine surgeries who developed left upper extremity radiculopathy.  Now presented for surgery.  Underwent c5-c6 anterior cervical discectomy and fusion on 12/11/2019.  Tolerated procedure well with no immediate complications.  Admitted to the neurosurgery service for routine post op care.  Oxycodone used for pain control.  Patient tolerating diet (solids and liquids) with no difficulties.  Has been out of bed.  Physical therapy evaluated patient and said no needs upon discharge.  Stable for discharge.        Pt did well after surgery, with improvement in neck and right shoulder pain.            I-stop reference #[id number]

## 2019-12-12 NOTE — DISCHARGE NOTE NURSING/CASE MANAGEMENT/SOCIAL WORK - PATIENT PORTAL LINK FT
You can access the FollowMyHealth Patient Portal offered by [facility] by registering at the following website: [email or web address]. By joining [facility] FollowMyHealth portal, you will also be able to view your health information using other applications (apps) compatible with our system.

## 2019-12-12 NOTE — PHYSICAL THERAPY INITIAL EVALUATION ADULT - ADDITIONAL COMMENTS
Pt resides in a private home with spouse and two children, 2 steps to enter +handrail for assist and no stairs to negotiate inside the home. Pt reports ambulating without AD and performing all ADLs independently prior to admit. (+) driving and working

## 2019-12-12 NOTE — PROVIDER CONTACT NOTE (OTHER) - ASSESSMENT
ancef 2gms at 0200 was not done - PACU nurse did not give while in recovery room- pt came up at 0400- pacu rn was unable to be contacted but she did call the unit and stated she did not give it

## 2019-12-12 NOTE — DISCHARGE NOTE PROVIDER - NSDCFUSCHEDAPPT_GEN_ALL_CORE_FT
ABHIJIT PETERS ; 01/23/2020 ; BERTRAND Fischer [address] [facility] ABHIJIT PETERS ; 01/23/2020 ; BERTRAND Fischer [address] [facility] ABHIJIT PETERS ; 01/23/2020 ; BERTRAND Fischer [address] [facility] ABHIJIT PETERS ; 01/23/2020 ; BERTRAND Fischer [address] [facility] ABHIJIT PETERS ; 01/23/2020 ; BERTRAND Fischer [address] [facility]

## 2019-12-12 NOTE — PHYSICAL THERAPY INITIAL EVALUATION ADULT - PERTINENT HX OF CURRENT PROBLEM, REHAB EVAL
Patient is a 55 y/o Male who presents with complaints of numbness/tingling into B UEs L>R. Pt reports history of cervical stenosis, now presents POD#1 s/p C5-6 ACDF with anterior plate on 12/11/19.

## 2019-12-12 NOTE — PROGRESS NOTE ADULT - ASSESSMENT
HPI:  Patient is a 54 year old male with prior spine surgeries who developed left upper extremity radiculopathy.  Now presented for surgery.    PROCEDURE: c5-c6 anterior cervical discectomy and fusion on 12/11/2019   POD#1    PLAN:  -oxycodone for pain control  -patient on robaxin at home for muscle spasms, will start here and discontinue flexeril  -if patient stays tonight, will start lovenox for DVT prophylaxis, SCDs for now  -no mrsa/mssa swab as per Dr. Skinner  -home medications  -regular diet  -out of bed with assistance  -incentive spirometer for lung expansion  -pt - no pt needs upon discharge  -dispo planning    Spectra #[id number]                Assessment:  Please Check When Present   []  GCS  E   V  M     Heart Failure: []Acute, [] acute on chronic , []chronic  Heart Failure:  [] Diastolic (HFpEF), [] Systolic (HFrEF), []Combined (HFpEF and HFrEF), [] RHF, [] Pulm HTN, [] Other    [] АННА, [] ATN, [] AIN, [] other  [] CKD1, [] CKD2, [] CKD 3, [] CKD 4, [] CKD 5, []ESRD    Encephalopathy: [] Metabolic, [] Hepatic, [] toxic, [] Neurological, [] Other    Abnormal Nurtitional Status: [] malnurtition (see nutrition note), [ ]underweight: BMI < 19, [] morbid obesity: BMI >40, [] Cachexia    [] Sepsis  [] hypovolemic shock,[] cardiogenic shock, [] hemorrhagic shock, [] neuogenic shock  [] Acute Respiratory Failure  []Cerebral edema, [] Brain compression/ herniation,   [] Functional quadriplegia  [] Acute blood loss anemia

## 2019-12-12 NOTE — SBIRT NOTE ADULT - NSSBIRTDRGNOACTINTDET_GEN_A_CORE
This patient is a participant of the [facility], located on [address] [place] [address], and he gets medical marijuana prescribed.

## 2020-01-23 ENCOUNTER — NON-APPOINTMENT (OUTPATIENT)
Age: 55
End: 2020-01-23

## 2020-01-23 ENCOUNTER — APPOINTMENT (OUTPATIENT)
Dept: PULMONOLOGY | Facility: CLINIC | Age: 55
End: 2020-01-23
Payer: COMMERCIAL

## 2020-01-23 VITALS
DIASTOLIC BLOOD PRESSURE: 70 MMHG | BODY MASS INDEX: 29.25 KG/M2 | SYSTOLIC BLOOD PRESSURE: 110 MMHG | HEIGHT: 68 IN | RESPIRATION RATE: 17 BRPM | WEIGHT: 193 LBS | HEART RATE: 88 BPM | OXYGEN SATURATION: 98 %

## 2020-01-23 PROCEDURE — 99214 OFFICE O/P EST MOD 30 MIN: CPT | Mod: 25

## 2020-01-23 PROCEDURE — 94010 BREATHING CAPACITY TEST: CPT

## 2020-01-23 PROCEDURE — 95012 NITRIC OXIDE EXP GAS DETER: CPT

## 2020-01-23 NOTE — PHYSICAL EXAM
[General Appearance - Well Developed] : well developed [General Appearance - Well Nourished] : well nourished [Normal Appearance] : normal appearance [No Deformities] : no deformities [Well Groomed] : well groomed [Eyelids - No Xanthelasma] : the eyelids demonstrated no xanthelasmas [Normal Conjunctiva] : the conjunctiva exhibited no abnormalities [General Appearance - In No Acute Distress] : no acute distress [Normal Oropharynx] : normal oropharynx [Neck Appearance] : the appearance of the neck was normal [Jugular Venous Distention Increased] : there was no jugular-venous distention [Neck Cervical Mass (___cm)] : no neck mass was observed [Heart Rate And Rhythm] : heart rate and rhythm were normal [Heart Sounds] : normal S1 and S2 [Thyroid Nodule] : there were no palpable thyroid nodules [Thyroid Diffuse Enlargement] : the thyroid was not enlarged [Murmurs] : no murmurs present [Respiration, Rhythm And Depth] : normal respiratory rhythm and effort [Auscultation Breath Sounds / Voice Sounds] : lungs were clear to auscultation bilaterally [Exaggerated Use Of Accessory Muscles For Inspiration] : no accessory muscle use [Abdomen Soft] : soft [Abdomen Tenderness] : non-tender [Abdomen Mass (___ Cm)] : no abdominal mass palpated [Abnormal Walk] : normal gait [Gait - Sufficient For Exercise Testing] : the gait was sufficient for exercise testing [Nail Clubbing] : no clubbing of the fingernails [Petechial Hemorrhages (___cm)] : no petechial hemorrhages [Cyanosis, Localized] : no localized cyanosis [Skin Color & Pigmentation] : normal skin color and pigmentation [] : no rash [No Venous Stasis] : no venous stasis [Deep Tendon Reflexes (DTR)] : deep tendon reflexes were 2+ and symmetric [No Xanthoma] : no  xanthoma was observed [Skin Lesions] : no skin lesions [No Skin Ulcers] : no skin ulcer [Oriented To Time, Place, And Person] : oriented to person, place, and time [Sensation] : the sensory exam was normal to light touch and pinprick [No Focal Deficits] : no focal deficits [Affect] : the affect was normal [Impaired Insight] : insight and judgment were intact [II] : II [FreeTextEntry1] : I:E ratio 1:3; clear

## 2020-01-23 NOTE — ADDENDUM
[FreeTextEntry1] : Documented by EMILIA SCHNEIDER acting as a scribe for Dr. Justice Lama on 01/23/2020.\par \par All medical record entries made by the Scribe were at my, Dr. Justice Lama's, direction and personally dictated by me on 01/23/2020. I have reviewed the chart and agree that the record accurately reflects my personal performance of the history, physical exam, assessment and plan. I have also personally directed, reviewed, and agree with the discharge instructions.

## 2020-01-23 NOTE — ASSESSMENT
[FreeTextEntry1] : **********************ALL PRINTED SCRIPTS TO BE FILLED THROUGH [facility] FUND*************************\par \par Mr. Corrales is a 54 year old male who has a history of asthma, allergy, GERD, and abnormal CT. He is s/p [place] exposure. He now presents to the office today for a follow up pulmonary visit, now s/p complicated cervical spine surgery with a residual cough. \par \par problem 1: abnormal chest CT - improved \par -? reflecting pneumonia or inflammatory panel- Resolved\par -PPD Quantiferon gold (-), ESR / Hypersensitivity panel (wnl)\par -Follow up Chest CT - 9/2020\par \par problem 2: asthma (active)\par -continue QVar 80 puffs BID\par -continue to use Symbicort 160 at 2 inhalations BID\par -continue Spiriva at 1 inhalation QD \par -continue to use Singulair 10 mg QHS \par -continue to use Ventolin PRN / Xopenex 0.63 via nebulizer q6H prn\par - add Pulmicort 2 inhalations BID\par \par -Asthma is  believed to be caused by inherited (genetic) and environmental factor, but its exact cause is unknown. Asthma may be triggered by allergens, lung infections, or irritants in the air. Asthma triggers are different for each person\par -Inhaler technique reviewed as well as oral hygiene techniques reviewed with patient. Avoidance of cold air, extremes of temperature, rescue inhaler should be used before exercise. Order of medication reviewed with patient. Recommended use of a cool mist humidifier in the bedroom.\par \par problem 3: cough; ? Sensory neuropathic cough \par -continue Amitriptyline 10 mg up to TID, prn (DC if able)\par -continue Promethazine DM prn\par Sensory neuropathic cough is an etiology of cough that is often realized once someone has been ruled out for common disease such as: asthma, COPD, eosinophilic bronchitis, bronchiectasis, post nasal drip, and GERD. It sometimes develops following a URI, herpes zoster outbreak in pharynx or thyroid or cervical spine injury. However, many patients have no identifiable antecedent explanation. \par \par problem 4: allergic rhinitis \par -recommended to use "Navage" nasal rinse device \par -add Claritin 10 mg QAM \par -continue to use Xyzal 5 mg QHS\par -continue to use Omnaris 1 sniff/nostril BID\par -continue Olopatadine 1 sniff each nostril BID (.6)\par -follow up with Dr. Ok Islas  - ?surgery\par -Environmental measures for allergies were encouraged including mattress and pillow cover, air purifier, and environmental controls.\par \par problem 5: GERD (FeeSST- c/w LPR)\par -continue to use Dexilant 60 mg before first meal\par -continue to use Pepcid 40 mg QHS \par \par -Rule of 2s: avoid eating too much, eating too late, eating too spicy, eating two hours before bed\par -Things to avoid including overeating, spicy foods, tight clothing, eating within three hours of bed, this list is not all inclusive. \par -For treatment of reflux, possible options discussed including diet control, H2 blockers, PPIs, as well as coating motility agents discussed as treatment options. Timing of meals and proximity of last meal to sleep were discussed. If symptoms persist, a formal gastrointestinal evaluation is needed. \par \par problem 6: insomnia\par -continue to use Ambien PRN \par -recommended good sleep hygiene\par -Good sleep hygiene was encouraged including avoiding watching television an hour before bed, keeping caffeine at a low,  avoiding reading, television, or anything, in bed, no drinking any liquids three hours before bedtime, and only getting into bed when tired and ready for sleep.\par \par problem 7: foot cramps (resolved)\par -continue to use MyoCalm PM\par \par problem 8: health maintenance\par -received flu shot in 2018\par -recommended strep pneumonia vaccines: Prevnar-13 vaccine, followed by Pneumo vaccine 23 on year following\par -recommended early intervention for URIs\par -recommended osteoporosis evaluations\par -recommended early dermatological evaluations\par -recommended after the age of 50 to the age of 70, colonoscopy every 5 years\par -encouraged early intervention\par \par \par F/U in 4 months\par He is encouraged to call with any changes, concerns, or questions. \par \par ***********************ALL PRINTED SCRIPTS TO BE FILLED THROUGH [facility] FUND************************************

## 2020-01-23 NOTE — PROCEDURE
[FreeTextEntry1] : PFT- spi reveals normal flows; FEV1 was 4.20 L which is 124% of predicted;  normal flow volume loop \par \par FENO was 21; a normal value being less than 25\par \par Fractional exhaled nitric oxide (FENO) is regarded as a simple, noninvasive method for assessing eosinophilic airway inflammation. Produced by a variety of cells within the lung, nitric oxide (NO) concentrations are generally low in healthy individuals. However, high concentrations of NO appear to be involved in nonspecific host defense mechanisms and chronic inflammatory diseases such as asthma. The American Thoracic Society (ATS) therefore has recommended using FENO to aid in the diagnosis and monitoring of eosinophilic airway inflammation and asthma, and for identifying steroid responsive individuals whose chronic respiratory symptoms may be caused by airway inflammation.

## 2020-01-23 NOTE — REVIEW OF SYSTEMS
[Dyspnea] : dyspnea [Constipation] : constipation [Back Pain] : ~T back pain [Myalgias] : myalgias [Arthralgias] : arthralgias [As Noted in HPI] : as noted in HPI [Difficulty Initiating Sleep] : difficulty falling asleep [Difficulty Maintaining Sleep] : difficulty maintaining sleep [Negative] : Pulmonary Hypertension [Postnasal Drip] : no postnasal drip [Sinus Problems] : no sinus problems [Nasal Congestion] : no nasal congestion [Chest Discomfort] : no chest discomfort [Dysphagia] : no dysphagia [Diarrhea] : no diarrhea

## 2020-01-23 NOTE — HISTORY OF PRESENT ILLNESS
[FreeTextEntry1] : Mr. Corrales is a 54 year old male with a history of allergic rhinitis, asthma, abnormal chest CT, and GERD presenting to the office today for a follow up visit s/p cervical surgery in December, now with a chief complaint of persistent cough. \david - He underwent cervical disc surgery in December, and reports he has had a constant cough since then.\par - He reports the cough ranges from a dry to more productive cough, which does not vary with positional changes.\par - He reports he was intubated during the surgery and feels it has impacted the hoarseness of his voice. \par - He reports he did have a postoperative infection which did require I&D. \par - He states his neck was doing well, but he did trip in his home which did worsen his pain. Paresthesias and numbness have resolved in his upper extremities. \par - He reports his bowels have not been stable since GE surgery.\par - He feels his diet could be better, as he is up 8-10lbs due to not being able to exercise following cervical surgery.\par - He reports he has been compliant with his medications, additionally using OTC cough suppressants. \par - He had a sinus CT that was WNL, though he continues to have intermittent rhinitis and congestion. \par - He states his sleep is improved at this time. \par - He denies any headaches, nausea, vomiting, fever, chills, sweats, chest pain, chest pressure, palpitations, dysphagia, dizziness, leg swelling, leg pain, itchy eyes, itchy ears, heartburn, reflux, sour taste in the mouth, SOB, wheeze.

## 2020-02-12 ENCOUNTER — APPOINTMENT (OUTPATIENT)
Dept: GASTROENTEROLOGY | Facility: CLINIC | Age: 55
End: 2020-02-12
Payer: COMMERCIAL

## 2020-02-12 VITALS
OXYGEN SATURATION: 98 % | BODY MASS INDEX: 28.04 KG/M2 | SYSTOLIC BLOOD PRESSURE: 100 MMHG | HEART RATE: 90 BPM | WEIGHT: 185 LBS | HEIGHT: 68 IN | DIASTOLIC BLOOD PRESSURE: 80 MMHG | TEMPERATURE: 98.2 F

## 2020-02-12 DIAGNOSIS — R19.7 DIARRHEA, UNSPECIFIED: ICD-10-CM

## 2020-02-12 DIAGNOSIS — R19.4 CHANGE IN BOWEL HABIT: ICD-10-CM

## 2020-02-12 PROCEDURE — 36415 COLL VENOUS BLD VENIPUNCTURE: CPT

## 2020-02-12 PROCEDURE — 99204 OFFICE O/P NEW MOD 45 MIN: CPT | Mod: 25

## 2020-02-12 RX ORDER — CYCLOBENZAPRINE HYDROCHLORIDE 5 MG/1
5 TABLET, FILM COATED ORAL
Qty: 90 | Refills: 0 | Status: DISCONTINUED | COMMUNITY
Start: 2018-01-10 | End: 2020-02-12

## 2020-02-12 RX ORDER — MONTELUKAST SODIUM 10 MG/1
10 TABLET, FILM COATED ORAL
Qty: 1 | Refills: 1 | Status: DISCONTINUED | OUTPATIENT
Start: 2017-05-25 | End: 2020-02-12

## 2020-02-12 RX ORDER — AMITRIPTYLINE HYDROCHLORIDE 10 MG/1
10 TABLET, FILM COATED ORAL
Qty: 270 | Refills: 1 | Status: DISCONTINUED | OUTPATIENT
Start: 2018-03-27 | End: 2020-02-12

## 2020-02-12 RX ORDER — DEXLANSOPRAZOLE 60 MG/1
60 CAPSULE, DELAYED RELEASE ORAL
Qty: 90 | Refills: 1 | Status: DISCONTINUED | OUTPATIENT
Start: 2017-05-25 | End: 2020-02-12

## 2020-02-12 RX ORDER — PROMETHAZINE HYDROCHLORIDE AND CODEINE PHOSPHATE 6.25; 1 MG/5ML; MG/5ML
6.25-1 SOLUTION ORAL
Qty: 240 | Refills: 0 | Status: DISCONTINUED | COMMUNITY
Start: 2017-05-25 | End: 2020-02-12

## 2020-02-12 RX ORDER — CICLESONIDE 50 UG/1
50 SPRAY NASAL
Qty: 37.5 | Refills: 1 | Status: DISCONTINUED | OUTPATIENT
Start: 2018-06-07 | End: 2020-02-12

## 2020-02-12 RX ORDER — BUDESONIDE AND FORMOTEROL FUMARATE DIHYDRATE 160; 4.5 UG/1; UG/1
160-4.5 AEROSOL RESPIRATORY (INHALATION) TWICE DAILY
Qty: 3 | Refills: 1 | Status: DISCONTINUED | OUTPATIENT
Start: 2017-05-25 | End: 2020-02-12

## 2020-02-12 RX ORDER — BUDESONIDE AND FORMOTEROL FUMARATE DIHYDRATE 160; 4.5 UG/1; UG/1
160-4.5 AEROSOL RESPIRATORY (INHALATION) TWICE DAILY
Qty: 3 | Refills: 1 | Status: DISCONTINUED | OUTPATIENT
Start: 2018-06-07 | End: 2020-02-12

## 2020-02-12 RX ORDER — RANITIDINE 300 MG/1
300 TABLET ORAL
Qty: 90 | Refills: 3 | Status: DISCONTINUED | OUTPATIENT
Start: 2017-05-25 | End: 2020-02-12

## 2020-02-12 RX ORDER — ALBUTEROL SULFATE 90 UG/1
108 (90 BASE) AEROSOL, METERED RESPIRATORY (INHALATION) EVERY 6 HOURS
Qty: 3 | Refills: 1 | Status: DISCONTINUED | OUTPATIENT
Start: 2017-05-25 | End: 2020-02-12

## 2020-02-12 RX ORDER — CHOLECALCIFEROL (VITAMIN D3) 25 MCG
TABLET ORAL
Refills: 0 | Status: ACTIVE | COMMUNITY

## 2020-02-12 RX ORDER — TIOTROPIUM BROMIDE 18 UG/1
18 CAPSULE ORAL; RESPIRATORY (INHALATION)
Qty: 3 | Refills: 1 | Status: DISCONTINUED | OUTPATIENT
Start: 2018-03-27 | End: 2020-02-12

## 2020-02-12 RX ORDER — LEVOCETIRIZINE DIHYDROCHLORIDE 5 MG/1
5 TABLET ORAL
Qty: 1 | Refills: 1 | Status: DISCONTINUED | OUTPATIENT
Start: 2017-11-27 | End: 2020-02-12

## 2020-02-12 RX ORDER — DEXLANSOPRAZOLE 60 MG/1
60 CAPSULE, DELAYED RELEASE ORAL
Qty: 1 | Refills: 1 | Status: DISCONTINUED | OUTPATIENT
Start: 2018-06-07 | End: 2020-02-12

## 2020-02-12 RX ORDER — LEVOCETIRIZINE DIHYDROCHLORIDE 5 MG/1
5 TABLET, FILM COATED ORAL
Qty: 1 | Refills: 1 | Status: DISCONTINUED | OUTPATIENT
Start: 2017-05-25 | End: 2020-02-12

## 2020-02-12 RX ORDER — LEVOCETIRIZINE DIHYDROCHLORIDE 5 MG/1
5 TABLET ORAL
Qty: 1 | Refills: 1 | Status: DISCONTINUED | OUTPATIENT
Start: 2018-06-07 | End: 2020-02-12

## 2020-02-12 RX ORDER — TIOTROPIUM BROMIDE 18 UG/1
18 CAPSULE ORAL; RESPIRATORY (INHALATION)
Qty: 3 | Refills: 1 | Status: DISCONTINUED | OUTPATIENT
Start: 2018-06-07 | End: 2020-02-12

## 2020-02-12 RX ORDER — AMITRIPTYLINE HYDROCHLORIDE 10 MG/1
10 TABLET, FILM COATED ORAL 3 TIMES DAILY
Qty: 270 | Refills: 1 | Status: DISCONTINUED | OUTPATIENT
Start: 2018-06-07 | End: 2020-02-12

## 2020-02-12 RX ORDER — CICLESONIDE 50 UG/1
50 SPRAY NASAL
Qty: 37.5 | Refills: 0 | Status: DISCONTINUED | OUTPATIENT
Start: 2017-05-25 | End: 2020-02-12

## 2020-02-12 RX ORDER — TELMISARTAN 20 MG/1
TABLET ORAL
Refills: 0 | Status: ACTIVE | COMMUNITY

## 2020-02-12 RX ORDER — RANITIDINE 300 MG/1
300 TABLET ORAL
Qty: 90 | Refills: 1 | Status: DISCONTINUED | OUTPATIENT
Start: 2018-06-07 | End: 2020-02-12

## 2020-02-12 RX ORDER — LEVOCETIRIZINE DIHYDROCHLORIDE 5 MG/1
5 TABLET ORAL
Qty: 1 | Refills: 1 | Status: DISCONTINUED | OUTPATIENT
Start: 2018-08-14 | End: 2020-02-12

## 2020-02-12 RX ORDER — BECLOMETHASONE DIPROPIONATE 80 UG/1
80 AEROSOL, METERED RESPIRATORY (INHALATION) TWICE DAILY
Qty: 3 | Refills: 1 | Status: DISCONTINUED | OUTPATIENT
Start: 2017-11-27 | End: 2020-02-12

## 2020-02-12 RX ORDER — ALBUTEROL SULFATE 90 UG/1
108 (90 BASE) AEROSOL, METERED RESPIRATORY (INHALATION) EVERY 6 HOURS
Qty: 3 | Refills: 1 | Status: DISCONTINUED | OUTPATIENT
Start: 2018-06-07 | End: 2020-02-12

## 2020-02-12 RX ORDER — RANITIDINE 300 MG/1
300 TABLET ORAL
Qty: 90 | Refills: 3 | Status: DISCONTINUED | OUTPATIENT
Start: 2018-08-14 | End: 2020-02-12

## 2020-02-12 RX ORDER — MONTELUKAST 10 MG/1
10 TABLET, FILM COATED ORAL
Qty: 1 | Refills: 1 | Status: DISCONTINUED | OUTPATIENT
Start: 2018-06-07 | End: 2020-02-12

## 2020-02-13 NOTE — PHYSICAL EXAM
[General Appearance - Alert] : alert [General Appearance - In No Acute Distress] : in no acute distress [Neck Appearance] : the appearance of the neck was normal [Neck Cervical Mass (___cm)] : no neck mass was observed [Jugular Venous Distention Increased] : there was no jugular-venous distention [Thyroid Diffuse Enlargement] : the thyroid was not enlarged [Thyroid Nodule] : there were no palpable thyroid nodules [Auscultation Breath Sounds / Voice Sounds] : lungs were clear to auscultation bilaterally [Heart Rate And Rhythm] : heart rate was normal and rhythm regular [Heart Sounds Gallop] : no gallops [Heart Sounds] : normal S1 and S2 [Heart Sounds Pericardial Friction Rub] : no pericardial rub [Murmurs] : no murmurs [Abdomen Soft] : soft [Bowel Sounds] : normal bowel sounds [Edema] : there was no peripheral edema [] : no hepato-splenomegaly [Abdomen Tenderness] : non-tender [Abdomen Mass (___ Cm)] : no abdominal mass palpated [No CVA Tenderness] : no ~M costovertebral angle tenderness [Oriented To Time, Place, And Person] : oriented to person, place, and time [Affect] : the affect was normal [Impaired Insight] : insight and judgment were intact [FreeTextEntry1] : positive tenderness at surgical site

## 2020-02-13 NOTE — HISTORY OF PRESENT ILLNESS
[FreeTextEntry1] : The patient is a 54-year-old man with a history of [place] exposure and a history of a gastric bypass as well as a lap band who underwent laminectomy and lumbar fusion in July 2019 complicated by postoperative infection and then underwent cervical disc replacement and fusion in December.  Following his surgeries in July the patient noticed a change in his bowel habits.  He is now having 2-3 bowel movements at a time approximately 3-4 times a week which is bloody/soft in consistency.  He denies melena or bright red blood per rectum.  Patient is on Dexilant for chronic reflux.  He denies heartburn, dysphasia, or abdominal pain.  He has gained a little weight.  The patient was hospitalized last year for the above surgeries and the postoperative infection.  He denies any cardiac history.

## 2020-02-13 NOTE — CONSULT LETTER
[FreeTextEntry1] : Dear Dr. Christian Joyner,\[facility] \[facility] I had the pleasure of seeing your patient ABHIJIT PETERS in the office today.  My office note is attached. PLEASE READ THE "ASSESSMENT" SECTION OF THE NOTE TO SEE MY IMPRESSION AND PLAN.\par \[facility] Thank you very much for allowing me to participate in the care of your patient.\[facility] \[facility] Sincerely,\[facility] \[facility] Bernardo Ramos M.D., FAC, [facility] Director, Celiac Program at [facility]  of Medicine\[facility] [employer] [facility] [facility] [employer] at [facility] Practice Director,\[facility] - Gastroenterology/Internal Medicine at [place]\[facility] [address]\[place] [address]\[facility] Tel: [phone]\[facility] Email: [email or web address] \[facility] \[facility] The attached note has been created using a voice recognition system (Dragon).  There may be some misspellings and typos.  Please call my office if you have any issues or questions.

## 2020-02-13 NOTE — ASSESSMENT
[FreeTextEntry1] : Patient with a change in bowel habits with bloody/soft stools dating back to her surgeries last year.  He also has chronic reflux and is doing well on Dexilant.  He has a history of [place] exposure.\par \par An EGD and colonoscopy have been scheduled. The risks, benefits, alternatives, and limitations of the procedures, including the possibility of missed lesions, were explained.  The patient will require anesthesia clearance at [facility] prior to the procedure.\par \par Bloodwork was sent for CBC, chem-pack, TSH, celiac markers.\par \par Stool studies will be sent for a GI infectious PCR panel and C. diff by PCR.

## 2020-02-13 NOTE — REASON FOR VISIT
[Initial Evaluation] : an initial evaluation [FreeTextEntry1] : Change in bowel habits, diarrhea, reflux

## 2020-02-16 LAB
ALBUMIN SERPL ELPH-MCNC: 4.8 G/DL
ALP BLD-CCNC: 96 U/L
ALT SERPL-CCNC: 8 U/L
ANION GAP SERPL CALC-SCNC: 14 MMOL/L
AST SERPL-CCNC: 16 U/L
BASOPHILS # BLD AUTO: 0.04 K/UL
BASOPHILS NFR BLD AUTO: 0.7 %
BILIRUB SERPL-MCNC: 0.4 MG/DL
BUN SERPL-MCNC: 18 MG/DL
C DIFF TOX GENS STL QL NAA+PROBE: NORMAL
CALCIUM SERPL-MCNC: 9.7 MG/DL
CDIFF BY PCR: NOT DETECTED
CHLORIDE SERPL-SCNC: 104 MMOL/L
CO2 SERPL-SCNC: 24 MMOL/L
CREAT SERPL-MCNC: 1.26 MG/DL
ENDOMYSIUM IGA SER QL: NEGATIVE
ENDOMYSIUM IGA TITR SER: NORMAL
EOSINOPHIL # BLD AUTO: 0.08 K/UL
EOSINOPHIL NFR BLD AUTO: 1.3 %
GI PCR PANEL, STOOL: NORMAL
GLIADIN IGA SER QL: <5 UNITS
GLIADIN IGG SER QL: <5 UNITS
GLIADIN PEPTIDE IGA SER-ACNC: NEGATIVE
GLIADIN PEPTIDE IGG SER-ACNC: NEGATIVE
GLUCOSE SERPL-MCNC: 87 MG/DL
HCT VFR BLD CALC: 47.9 %
HGB BLD-MCNC: 15.6 G/DL
IGA SER QL IEP: 50 MG/DL
IMM GRANULOCYTES NFR BLD AUTO: 0.3 %
LYMPHOCYTES # BLD AUTO: 1.81 K/UL
LYMPHOCYTES NFR BLD AUTO: 30.4 %
MAN DIFF?: NORMAL
MCHC RBC-ENTMCNC: 27.3 PG
MCHC RBC-ENTMCNC: 32.6 GM/DL
MCV RBC AUTO: 83.9 FL
MONOCYTES # BLD AUTO: 0.44 K/UL
MONOCYTES NFR BLD AUTO: 7.4 %
NEUTROPHILS # BLD AUTO: 3.56 K/UL
NEUTROPHILS NFR BLD AUTO: 59.9 %
PLATELET # BLD AUTO: 250 K/UL
POTASSIUM SERPL-SCNC: 4.9 MMOL/L
PROT SERPL-MCNC: 6.9 G/DL
RBC # BLD: 5.71 M/UL
RBC # FLD: 15.9 %
SODIUM SERPL-SCNC: 142 MMOL/L
TSH SERPL-ACNC: 1.3 UIU/ML
TTG IGA SER IA-ACNC: <1.2 U/ML
TTG IGA SER-ACNC: NEGATIVE
TTG IGG SER IA-ACNC: 2.2 U/ML
TTG IGG SER IA-ACNC: NEGATIVE
WBC # FLD AUTO: 5.95 K/UL

## 2020-03-10 ENCOUNTER — APPOINTMENT (OUTPATIENT)
Dept: GASTROENTEROLOGY | Facility: AMBULATORY MEDICAL SERVICES | Age: 55
End: 2020-03-10
Payer: COMMERCIAL

## 2020-03-10 PROCEDURE — 45380 COLONOSCOPY AND BIOPSY: CPT | Mod: 33

## 2020-03-10 PROCEDURE — 43239 EGD BIOPSY SINGLE/MULTIPLE: CPT

## 2020-03-10 PROCEDURE — 45385 COLONOSCOPY W/LESION REMOVAL: CPT | Mod: 33,59

## 2020-04-14 ENCOUNTER — OUTPATIENT (OUTPATIENT)
Dept: OUTPATIENT SERVICES | Facility: HOSPITAL | Age: 55
LOS: 1 days | End: 2020-04-14
Payer: COMMERCIAL

## 2020-04-14 ENCOUNTER — APPOINTMENT (OUTPATIENT)
Dept: CT IMAGING | Facility: CLINIC | Age: 55
End: 2020-04-14
Payer: COMMERCIAL

## 2020-04-14 DIAGNOSIS — Z98.890 OTHER SPECIFIED POSTPROCEDURAL STATES: Chronic | ICD-10-CM

## 2020-04-14 DIAGNOSIS — M47.22 OTHER SPONDYLOSIS WITH RADICULOPATHY, CERVICAL REGION: ICD-10-CM

## 2020-04-14 DIAGNOSIS — Z90.49 ACQUIRED ABSENCE OF OTHER SPECIFIED PARTS OF DIGESTIVE TRACT: Chronic | ICD-10-CM

## 2020-04-14 DIAGNOSIS — Z98.84 BARIATRIC SURGERY STATUS: Chronic | ICD-10-CM

## 2020-04-14 PROCEDURE — 76376 3D RENDER W/INTRP POSTPROCES: CPT | Mod: 26

## 2020-04-14 PROCEDURE — 72125 CT NECK SPINE W/O DYE: CPT

## 2020-04-14 PROCEDURE — 76376 3D RENDER W/INTRP POSTPROCES: CPT

## 2020-04-14 PROCEDURE — 72125 CT NECK SPINE W/O DYE: CPT | Mod: 26

## 2020-04-20 ENCOUNTER — APPOINTMENT (OUTPATIENT)
Dept: GASTROENTEROLOGY | Facility: CLINIC | Age: 55
End: 2020-04-20

## 2020-05-26 ENCOUNTER — APPOINTMENT (OUTPATIENT)
Dept: PULMONOLOGY | Facility: CLINIC | Age: 55
End: 2020-05-26
Payer: COMMERCIAL

## 2020-05-26 VITALS
WEIGHT: 186 LBS | DIASTOLIC BLOOD PRESSURE: 70 MMHG | OXYGEN SATURATION: 99 % | SYSTOLIC BLOOD PRESSURE: 130 MMHG | BODY MASS INDEX: 28.19 KG/M2 | HEART RATE: 78 BPM | TEMPERATURE: 97.9 F | HEIGHT: 68 IN | RESPIRATION RATE: 17 BRPM

## 2020-05-26 PROCEDURE — 99214 OFFICE O/P EST MOD 30 MIN: CPT

## 2020-05-26 NOTE — PHYSICAL EXAM
[General Appearance - Well Developed] : well developed [Well Groomed] : well groomed [Normal Appearance] : normal appearance [General Appearance - Well Nourished] : well nourished [No Deformities] : no deformities [General Appearance - In No Acute Distress] : no acute distress [Normal Conjunctiva] : the conjunctiva exhibited no abnormalities [Eyelids - No Xanthelasma] : the eyelids demonstrated no xanthelasmas [Normal Oropharynx] : normal oropharynx [II] : II [Neck Appearance] : the appearance of the neck was normal [Neck Cervical Mass (___cm)] : no neck mass was observed [Jugular Venous Distention Increased] : there was no jugular-venous distention [Thyroid Diffuse Enlargement] : the thyroid was not enlarged [Heart Rate And Rhythm] : heart rate and rhythm were normal [Thyroid Nodule] : there were no palpable thyroid nodules [Heart Sounds] : normal S1 and S2 [Murmurs] : no murmurs present [Exaggerated Use Of Accessory Muscles For Inspiration] : no accessory muscle use [Respiration, Rhythm And Depth] : normal respiratory rhythm and effort [Auscultation Breath Sounds / Voice Sounds] : lungs were clear to auscultation bilaterally [Abdomen Tenderness] : non-tender [Abdomen Soft] : soft [Abdomen Mass (___ Cm)] : no abdominal mass palpated [Nail Clubbing] : no clubbing of the fingernails [Gait - Sufficient For Exercise Testing] : the gait was sufficient for exercise testing [Abnormal Walk] : normal gait [Cyanosis, Localized] : no localized cyanosis [Petechial Hemorrhages (___cm)] : no petechial hemorrhages [] : no rash [Skin Color & Pigmentation] : normal skin color and pigmentation [Skin Lesions] : no skin lesions [No Skin Ulcers] : no skin ulcer [No Venous Stasis] : no venous stasis [No Xanthoma] : no  xanthoma was observed [Deep Tendon Reflexes (DTR)] : deep tendon reflexes were 2+ and symmetric [Sensation] : the sensory exam was normal to light touch and pinprick [Impaired Insight] : insight and judgment were intact [Oriented To Time, Place, And Person] : oriented to person, place, and time [No Focal Deficits] : no focal deficits [Affect] : the affect was normal [FreeTextEntry1] : I:E ratio 1:3; clear

## 2020-05-26 NOTE — ASSESSMENT
[FreeTextEntry1] : **********************ALL PRINTED SCRIPTS TO BE FILLED THROUGH [facility] FUND*************************\par \par Mr. Corrales is a 54 year old male who has a history of asthma, allergy, GERD, and abnormal CT. He is s/p [place] exposure. He now presents to the office today for a follow up pulmonary visit, now s/p complicated cervical spine surgery (12/2019) now stable- poor sleep. \par \par problem 1: abnormal chest CT - improved \par -? reflecting pneumonia or inflammatory panel- Resolved\par -PPD Quantiferon gold (-), ESR / Hypersensitivity panel (wnl)\par -Follow up Chest CT - 9/2020\par \par problem 2: asthma (stable)\par -continue QVar 80 puffs BID\par -continue to use Symbicort 160 at 2 inhalations BID\par -continue Spiriva at 1 inhalation QD \par -continue to use Singulair 10 mg QHS \par -continue to use Ventolin PRN / Xopenex 0.63 via nebulizer q6H prn\par \par -Asthma is  believed to be caused by inherited (genetic) and environmental factor, but its exact cause is unknown. Asthma may be triggered by allergens, lung infections, or irritants in the air. Asthma triggers are different for each person\par -Inhaler technique reviewed as well as oral hygiene techniques reviewed with patient. Avoidance of cold air, extremes of temperature, rescue inhaler should be used before exercise. Order of medication reviewed with patient. Recommended use of a cool mist humidifier in the bedroom.\par \par problem 3: cough; ? Sensory neuropathic cough \par -continue Amitriptyline 10 mg up to TID, prn (DC if able)\par -continue Promethazine DM prn\par Sensory neuropathic cough is an etiology of cough that is often realized once someone has been ruled out for common disease such as: asthma, COPD, eosinophilic bronchitis, bronchiectasis, post nasal drip, and GERD. It sometimes develops following a URI, herpes zoster outbreak in pharynx or thyroid or cervical spine injury. However, many patients have no identifiable antecedent explanation. \par \par problem 4: allergic rhinitis \par -recommended to use "Navage" nasal rinse device \par -continue Claritin 10 mg QAM \par -continue to use Xyzal 5 mg QHS\par -continue to use Omnaris 1 sniff/nostril BID\par -continue Olopatadine 1 sniff each nostril BID (.6)\par -follow up with Dr. Ok Islas  - s/p turbinectomy \par -Environmental measures for allergies were encouraged including mattress and pillow cover, air purifier, and environmental controls.\par \par problem 5: GERD (FeeSST- c/w LPR)\par -continue to use Dexilant 60 mg before first meal\par -continue to use Pepcid 40 mg QHS \par \par -Rule of 2s: avoid eating too much, eating too late, eating too spicy, eating two hours before bed\par -Things to avoid including overeating, spicy foods, tight clothing, eating within three hours of bed, this list is not all inclusive. \par -For treatment of reflux, possible options discussed including diet control, H2 blockers, PPIs, as well as coating motility agents discussed as treatment options. Timing of meals and proximity of last meal to sleep were discussed. If symptoms persist, a formal gastrointestinal evaluation is needed. \par \par problem 6: insomnia\par - medical marijuana\par -continue to use Ambien PRN \par -recommended good sleep hygiene\par -Good sleep hygiene was encouraged including avoiding watching television an hour before bed, keeping caffeine at a low,  avoiding reading, television, or anything, in bed, no drinking any liquids three hours before bedtime, and only getting into bed when tired and ready for sleep.\par \par problem 7: foot cramps (resolved)\par -continue to use MyoCalm PM\par \par Problem 8a: COVID-19 precautionary Immune Support Recommendations:\par -OTC Vitamin C 500mg BID \par -OTC Quercetin 250-500mg BID \par -OTC Zinc 75-100mg per day \par -OTC Melatonin 1 or 2mg a night \par -OTC Vitamin D 1-4000mg per day \par -OTC Tonic Water 8oz per day\par -Water 0.5-1 gallon per day\par \par problem 8: health maintenance\par -received flu shot in 2019\par -recommended strep pneumonia vaccines: Prevnar-13 vaccine, followed by Pneumo vaccine 23 on year following\par -recommended early intervention for URIs\par -recommended osteoporosis evaluations\par -recommended early dermatological evaluations\par -recommended after the age of 50 to the age of 70, colonoscopy every 5 years\par -encouraged early intervention\par \par \par F/U in 4 months\par He is encouraged to call with any changes, concerns, or questions. \par \par ***********************ALL PRINTED SCRIPTS TO BE FILLED THROUGH [facility] FUND************************************

## 2020-05-26 NOTE — HISTORY OF PRESENT ILLNESS
[FreeTextEntry1] : Mr. Corrales is a 54 year old male with a history of allergic rhinitis, asthma, abnormal chest CT, and GERD presenting to the office today for a follow up visit s/p cervical surgery in December.\par - They found 4-5 precancerous colon polyps during colonoscopy\par - Bronchoscopy showed bad GERD\par - He is still going to PT \par - He has a sour taste in his mouth \par - His breathing has been  pretty much stable \par - He notes he has had to use Xopenex a little more than usual\par - He is not sleeping well, partially due to work \par - He is not exercising much besides PT\par - He is taking medical marijuana to help with sleep \par - He tested negative for Covid19 antibodies\par - denies any headaches, nausea, vomiting, fever, chills, sweats, chest pain, chest pressure, diarrhea, constipation, dysphagia, dizziness, leg swelling, leg pain, itchy eyes, itchy ears.\par \par

## 2020-05-26 NOTE — ADDENDUM
[FreeTextEntry1] : Documented by Donna Canseco acting as a scribe for Dr. Justice Lama on (05/26/2020).\par \par All medical record entries made by the Scribe were at my, Dr. Justice Lama's, direction and personally dictated by me on (05/26/2020). I have reviewed the chart and agree that the record accurately reflects my personal performance of the history, physical exam, assessment and plan. I have also personally directed, reviewed, and agree with the discharge instructions. \par \par \par

## 2020-06-10 ENCOUNTER — APPOINTMENT (OUTPATIENT)
Dept: GASTROENTEROLOGY | Facility: CLINIC | Age: 55
End: 2020-06-10
Payer: COMMERCIAL

## 2020-06-10 VITALS
HEART RATE: 88 BPM | WEIGHT: 187 LBS | HEIGHT: 68 IN | DIASTOLIC BLOOD PRESSURE: 70 MMHG | OXYGEN SATURATION: 97 % | BODY MASS INDEX: 28.34 KG/M2 | TEMPERATURE: 98.2 F | SYSTOLIC BLOOD PRESSURE: 110 MMHG

## 2020-06-10 DIAGNOSIS — K21.9 GASTRO-ESOPHAGEAL REFLUX DISEASE W/OUT ESOPHAGITIS: ICD-10-CM

## 2020-06-10 PROCEDURE — 99213 OFFICE O/P EST LOW 20 MIN: CPT

## 2020-06-11 NOTE — HISTORY OF PRESENT ILLNESS
[FreeTextEntry1] : We reviewed the evaluations done since the patient's last visit on February 12, 2020.  Blood work and stool testing was normal.  The patient underwent EGD and colonoscopy on March 10, 2020.  EGD revealed an irregular Z line with biopsy showing evidence of reflux esophagitis.  The patient is status post gastric bypass.  All the biopsies were negative.  Colonoscopy was significant for removal of 2 tubular adenomas including one that was large and flat at the hepatic flexure and required piecemeal polypectomy.  The patient also has diverticulosis in the transverse, descending, and sigmoid colon as well as internal and external hemorrhoids which are asymptomatic.  The patient was taking Dexilant in the evening and famotidine in the morning.  He notes heartburn/sour taste in the early morning and sometimes at night.  He works varying schedules and sometimes works nights .\par \par \par Note from 2/12/2020 - The patient is a 54-year-old man with a history of [place] exposure and a history of a gastric bypass as well as a lap band who underwent laminectomy and lumbar fusion in July 2019 complicated by postoperative infection and then underwent cervical disc replacement and fusion in December.  Following his surgeries in July the patient noticed a change in his bowel habits.  He is now having 2-3 bowel movements at a time approximately 3-4 times a week which is bloody/soft in consistency.  He denies melena or bright red blood per rectum.  Patient is on Dexilant for chronic reflux.  He denies heartburn, dysphasia, or abdominal pain.  He has gained a little weight.  The patient was hospitalized last year for the above surgeries and the postoperative infection.  He denies any cardiac history.

## 2020-06-11 NOTE — ASSESSMENT
[FreeTextEntry1] : Patient with evidence of reflux esophagitis on endoscopy.  He had tubular adenomas including a large one that was removed in piecemeal fashion and also has diverticulosis.\par \par I advised the patient to change the medication to Dexilant in the morning before breakfast and famotidine at bedtime.\par \par A list of dietary and lifestyle modifications in the treatment of GERD was given to the patient.\par \par Information was given to the patient regarding diverticulosis and a high-fiber diet.\par \par Given the piecemeal polypectomy, we will repeat a colonoscopy in March 2021.\par \par \par Plan from 2/12/2020 - Patient with a change in bowel habits with bloody/soft stools dating back to her surgeries last year.  He also has chronic reflux and is doing well on Dexilant.  He has a history of [place] exposure.\par \par An EGD and colonoscopy have been scheduled. The risks, benefits, alternatives, and limitations of the procedures, including the possibility of missed lesions, were explained.  The patient will require anesthesia clearance at [facility] prior to the procedure.\par \par Bloodwork was sent for CBC, chem-pack, TSH, celiac markers.\par \par Stool studies will be sent for a GI infectious PCR panel and C. diff by PCR.

## 2020-06-11 NOTE — PHYSICAL EXAM
[General Appearance - Alert] : alert [General Appearance - In No Acute Distress] : in no acute distress [Neck Appearance] : the appearance of the neck was normal [Neck Cervical Mass (___cm)] : no neck mass was observed [Jugular Venous Distention Increased] : there was no jugular-venous distention [Thyroid Diffuse Enlargement] : the thyroid was not enlarged [Thyroid Nodule] : there were no palpable thyroid nodules [Auscultation Breath Sounds / Voice Sounds] : lungs were clear to auscultation bilaterally [Heart Sounds] : normal S1 and S2 [Heart Rate And Rhythm] : heart rate was normal and rhythm regular [Heart Sounds Gallop] : no gallops [Murmurs] : no murmurs [Heart Sounds Pericardial Friction Rub] : no pericardial rub [Edema] : there was no peripheral edema [Bowel Sounds] : normal bowel sounds [Abdomen Soft] : soft [] : no hepato-splenomegaly [Abdomen Tenderness] : non-tender [No CVA Tenderness] : no ~M costovertebral angle tenderness [Abdomen Mass (___ Cm)] : no abdominal mass palpated [Oriented To Time, Place, And Person] : oriented to person, place, and time [Impaired Insight] : insight and judgment were intact [Affect] : the affect was normal [FreeTextEntry1] : positive tenderness at surgical site

## 2020-06-11 NOTE — CONSULT LETTER
[FreeTextEntry1] : Dear Dr. Christian Joyner,\[facility] \[facility] I had the pleasure of seeing your patient ABHIJIT PETERS in the office today.  My office note is attached. PLEASE READ THE "ASSESSMENT" SECTION OF THE NOTE TO SEE MY IMPRESSION AND PLAN.\par \[facility] Thank you very much for allowing me to participate in the care of your patient.\[facility] \[facility] Sincerely,\[facility] \[facility] Bernardo Ramos M.D., FAC, [facility] Director, Celiac Program at [facility]  of Medicine\[facility] at [facility] Practice Director,\[facility] - Gastroenterology/Internal Medicine at [place]\[facility] [address]\[place] [address]\[facility] Tel: [phone]\[facility] Email: [email or web address] \[facility] \[facility] The attached note has been created using a voice recognition system (Dragon).  There may be some misspellings and typos.  Please call my office if you have any issues or questions.

## 2020-07-27 NOTE — H&P PST ADULT - BACK EXAM
Rituxan Counseling:  I discussed with the patient the risks of Rituxan infusions. Side effects can include infusion reactions, severe drug rashes including mucocutaneous reactions, reactivation of latent hepatitis and other infections and rarely progressive multifocal leukoencephalopathy.  All of the patient's questions and concerns were addressed. kyphosis/ROM limited

## 2020-09-29 ENCOUNTER — APPOINTMENT (OUTPATIENT)
Dept: PULMONOLOGY | Facility: CLINIC | Age: 55
End: 2020-09-29
Payer: COMMERCIAL

## 2020-09-29 VITALS
BODY MASS INDEX: 26.98 KG/M2 | SYSTOLIC BLOOD PRESSURE: 112 MMHG | WEIGHT: 178 LBS | HEIGHT: 68 IN | DIASTOLIC BLOOD PRESSURE: 70 MMHG | RESPIRATION RATE: 17 BRPM | TEMPERATURE: 97.6 F | HEART RATE: 81 BPM | OXYGEN SATURATION: 98 %

## 2020-09-29 PROCEDURE — 95012 NITRIC OXIDE EXP GAS DETER: CPT

## 2020-09-29 PROCEDURE — 99214 OFFICE O/P EST MOD 30 MIN: CPT | Mod: 25

## 2020-09-29 NOTE — HISTORY OF PRESENT ILLNESS
[FreeTextEntry1] : Mr. Corrales is a 54 year old male with a history of allergic rhinitis, asthma, abnormal chest CT, and GERD presenting to the office today for a follow up visit s/p cervical surgery in December.\par - he notes he had his 2nd injection in the hip. He will probably get a hip replacement. \par - he notes sleep is not well still. He get about 3-4 hours. \par - no chest pain / pressure\par - since the colonoscopy, and the pre-cancerous cyst / polyp was removed, bowel movements have been regular \par - he gets some reflux symptoms a bit\par - he notes his sinuses are still drippy, even after taking Clarinex \par - he notes he ended up getting thrush due to using his inhalers so much \par - his weight has been stable \par - he notes he's up at night to urinate once a night \par - he notes he has been coughing and getting SOB at times \par - he uses Xopenex and it has been working \par - he got a flu shot already \par - He  denies any visual issues, headaches, nausea, vomiting, fever, chills, sweats, chest pains, chest pressure,  dysphagia, myalgia, dizziness, leg swelling, leg pain, itchy eyes, itchy ears, heartburn, reflux, or sour taste in the mouth.

## 2020-09-29 NOTE — PROCEDURE
[FreeTextEntry1] : CT (SEP.9.2020) IMPRESSION: 1. few small 6 mm and less clustered nodules with the medial basilar left lower lobe costophrenic sulcus compared to CT 9/6/2019. very likely postinflammatory and may represent mucoid impaction. New 4 mmm right lower lobe groundglass nodule which ,may also be low grade infectious/inflammatory. \par \par  FENO was 13; normal value being less than 25\par Fractional exhaled nitric oxide (FENO) is regarded as a simple, noninvasive method for assessing eosinophilic airway inflammation. Produced by a variety of cells within the lung, nitric oxide (NO) concentrations are generally low in healthy individuals. However, high concentrations of NO appear to be involved in nonspecific host defense mechanisms and chronic inflammatory diseases such as asthma. The American Thoracic Society (ATS) therefore has recommended using FENO to aid in the diagnosis and monitoring of eosinophilic airway inflammation and asthma, and for identifying steroid responsive individuals whose chronic respiratory symptoms may be airway inflammation.\par

## 2020-09-29 NOTE — ADDENDUM
[FreeTextEntry1] : Documented by Nara Kilgore acting as a scribe for Dr. Justice Lama on 09/29/2020 \par \par All medical record entries made by the Scribe were at my, Dr. Justice Lama's, direction and personally dictated by me on 09/29/2020 . I have reviewed the chart and agree that the record accurately reflects my personal performance of the history, physical exam, assessment and plan. I have also personally directed, reviewed, and agree with the discharge instructions. \par

## 2020-12-21 ENCOUNTER — FORM ENCOUNTER (OUTPATIENT)
Age: 55
End: 2020-12-21

## 2020-12-22 ENCOUNTER — TRANSCRIPTION ENCOUNTER (OUTPATIENT)
Age: 55
End: 2020-12-22

## 2020-12-22 ENCOUNTER — APPOINTMENT (OUTPATIENT)
Dept: PULMONOLOGY | Facility: CLINIC | Age: 55
End: 2020-12-22
Payer: COMMERCIAL

## 2020-12-22 VITALS — BODY MASS INDEX: 27.28 KG/M2 | WEIGHT: 180 LBS | HEIGHT: 68 IN

## 2020-12-22 DIAGNOSIS — G47.00 INSOMNIA, UNSPECIFIED: ICD-10-CM

## 2020-12-22 DIAGNOSIS — R49.0 DYSPHONIA: ICD-10-CM

## 2020-12-22 PROCEDURE — 99214 OFFICE O/P EST MOD 30 MIN: CPT | Mod: 95

## 2020-12-22 RX ORDER — TIOTROPIUM BROMIDE 18 UG/1
18 CAPSULE ORAL; RESPIRATORY (INHALATION)
Qty: 3 | Refills: 1 | Status: DISCONTINUED | OUTPATIENT
Start: 2019-09-20 | End: 2020-12-22

## 2020-12-22 NOTE — DISCUSSION/SUMMARY
[Time Spent: ___ minutes] : I have spent [unfilled] minutes with the patient on the telephone [FreeTextEntry1] : The plan for the patient is as follows:\par \par #1.COVID19 + as of 12/22/2020; symptom onset 12/20/2020\par -add COVID19 bundle labs for complete evaluation for homedraw/order sent\par -given hx of asthma/age and use of ICS: pt to get enrolled for BAMLANIVIMAB infusion. Meets criteria. He will be reached out to once enrolled. \par -pt to keep me posted.\par -Needs to QUARANTINE x 14 days from COVID19 + test\par Recommendations to patients with possible or confirmed COVID19:\par 1.	Stay home and away from public places. If you must go out, avoid using any kind of public transportation, ridesharing, or taxis.\par 2.	Monitor your symptoms carefully. If your symptoms get worse, call your healthcare provider immediately.\par 3.	Get rest and stay hydrated.\par 4.	Monitor temperature- treat with Tylenol 650 mg Q 6 hours up to 1000 mg TID-QID but not exceed 3500 mg daily for liver precautions. Avoid use of NSAIDs.\par 5.	Be sure to continue to take your maintenance medications for chronic conditions.\par 6. Consider purchasing pulse oximeter to monitor 02 status- discussed baseline values vs. deterioration\par As much as possible, stay in a specific room and away from other people in your home. Also, you should use a separate bathroom, if available. If you need to be around other people in or outside of the home, wear a facemask.\par If you develop emergency warning signs for serious complications for COVID-19 get medical attention immediately. Emergency warning signs include*:\par -Trouble breathing/worsening- unresolved SOB\par -Persistent pain or pressure in the chest\par -New confusion or inability to arouse\par -Bluish lips or face\par -Worsening fatigue/malaise\par -Inability to eat or drink\par -High fever unresponsive to Tylenol\par Advised to keep us posted.\par \par #2.Asthma exacerbation- wheezing on expiration during visit with cough\par -add Dexamethasone 6mg x 10 days\par -continue to use Symbicort 160 at 2 inhalations BID rinse and gargle\par -change spiriva to spiriva respimat due to hoarse voice issues\par -continue to use Singulair 10 mg QHS \par - add xopenex via nebulizer up to QID PRN SOB, cough, chest tightness or wheezing- the nebulizer to ONLY BE USED in a closed room away from other contacts- with windows open or outdoors is best (away from others); keep door to room closed for at least 2 hours during and after neb treatment completion.\par -has Budesonide .5mg via neb on board also- can be used BID if cough/wheezing not under control\par \par #3.Cough\par -add tessalon perles 200 mg PO TID\par \par #4.allergic rhinitis \par -continue to use Xyzal 5 mg QHS\par -continue to use Omnaris 1 sniff/nostril BID\par -continue Olopatadine 1 sniff each nostril BID (.6)\par \par #5.GERD\par -advised to monitor for increase\par -continue to use Dexilant 60 mg before first meal\par -continue to use Pepcid 40 mg QHS \par \par #6.Insomnia\par -advised to AVOID valium given covid19 diagnosis (respiratory depression concern)\par -medical marijuana drops ok\par \par #7.Abnormal chest CT\par - c/w inflammation\par -Follow up Chest CT due 3/2021\par \par #8.COVID19 education\par Asthma and COVID19:\par You need to make sure your asthma is under control. This often requires the use of inhaled corticosteroids (and sometimes oral corticosteroids). Inhaled corticosteroids do not likely reduce your immune system’s ability to fight infections, but oral corticosteroids may. It is important to use the steps above to protect yourself to limit your exposure to any respiratory virus.\par \par \par \par Pt to keep me posted. \par Drugs sent to pharmacy\par hard copy of spiriva respimat created and faxed to [facility] doc to prescribe out. \par He is aware he will be contacted by [facility] as well as [facility] infusion team once enrolled. \par \par \par

## 2020-12-22 NOTE — HISTORY OF PRESENT ILLNESS
[Home] : at home, [unfilled] , at the time of the visit. [Medical Office: ([facility])___] : at the medical office located in  [Verbal consent obtained from patient] : the patient, [unfilled] [FreeTextEntry1] : Mr. Corrales is a 55 year old male with a history of allergic rhinitis, asthma, abnormal chest CT, and GERD presenting to the office today via video visit due to + COVID19 rapid test today- 12/22/2020. \par \par He reports exposure to his son who was COVID19 + 2 weeks prior. Patient was tested twice via rapid test initially as well as 1 PCR test on 12/12/2020- all of which were negative.\par COVID+ son lives in home with patient. \par Pt is a . \par Pt reports he was in his normal state of health until symptom onset 2 days prior with scratchy throat, and productive cough- which is now worsening somewhat.\par Pt compliant on his medications. He has not been nebulizing but has xopenex and budesonide at home. \par He is not on Qvar currently. \par He reports hx of chronic hoarse voice after using the inhalers. \par Has been wheezing intermittently in the last couple days. \par No fever, chills, aches or pains, loss of smell or taste appetite loss, SOB, chest pain or pressure. \par Reports GERD is stable-compliant on meds, Sinuses are stable on meds.\par He has been off cough meds for some time (amitriptyline and promethazine) but needs something else for cough now. OTC meds did not help him.\par He has hx of poor sleep and with the cough now, is sleeping even worse. He is on medical marijuana drops, valium PRN. \par \par \par \par

## 2020-12-24 ENCOUNTER — NON-APPOINTMENT (OUTPATIENT)
Age: 55
End: 2020-12-24

## 2020-12-24 LAB
ALBUMIN SERPL ELPH-MCNC: 4.5 G/DL
ALP BLD-CCNC: 105 U/L
ALT SERPL-CCNC: 26 U/L
ANION GAP SERPL CALC-SCNC: 14 MMOL/L
AST SERPL-CCNC: 22 U/L
BASOPHILS # BLD AUTO: 0 K/UL
BASOPHILS NFR BLD AUTO: 0 %
BILIRUB SERPL-MCNC: 0.4 MG/DL
BUN SERPL-MCNC: 19 MG/DL
CALCIUM SERPL-MCNC: 8.8 MG/DL
CHLORIDE SERPL-SCNC: 106 MMOL/L
CO2 SERPL-SCNC: 23 MMOL/L
CREAT SERPL-MCNC: 1 MG/DL
CRP SERPL-MCNC: 0.73 MG/DL
DEPRECATED D DIMER PPP IA-ACNC: <150 NG/ML DDU
EOSINOPHIL # BLD AUTO: 0.11 K/UL
EOSINOPHIL NFR BLD AUTO: 2 %
FERRITIN SERPL-MCNC: 124 NG/ML
GLUCOSE SERPL-MCNC: 89 MG/DL
HCT VFR BLD CALC: 42.3 %
HGB BLD-MCNC: 14.1 G/DL
IMM GRANULOCYTES NFR BLD AUTO: 0.4 %
LYMPHOCYTES # BLD AUTO: 0.68 K/UL
LYMPHOCYTES NFR BLD AUTO: 12.5 %
MAN DIFF?: NORMAL
MCHC RBC-ENTMCNC: 30.1 PG
MCHC RBC-ENTMCNC: 33.3 GM/DL
MCV RBC AUTO: 90.2 FL
MONOCYTES # BLD AUTO: 0.25 K/UL
MONOCYTES NFR BLD AUTO: 4.6 %
NEUTROPHILS # BLD AUTO: 4.39 K/UL
NEUTROPHILS NFR BLD AUTO: 80.5 %
PLATELET # BLD AUTO: 201 K/UL
POTASSIUM SERPL-SCNC: 4.7 MMOL/L
PROCALCITONIN SERPL-MCNC: 0.02 NG/ML
PROT SERPL-MCNC: 6.2 G/DL
RBC # BLD: 4.69 M/UL
RBC # FLD: 14.9 %
SODIUM SERPL-SCNC: 143 MMOL/L
WBC # FLD AUTO: 5.45 K/UL

## 2020-12-26 ENCOUNTER — OUTPATIENT (OUTPATIENT)
Dept: OUTPATIENT SERVICES | Facility: HOSPITAL | Age: 55
LOS: 1 days | End: 2020-12-26
Payer: COMMERCIAL

## 2020-12-26 ENCOUNTER — APPOINTMENT (OUTPATIENT)
Dept: DISASTER EMERGENCY | Facility: HOSPITAL | Age: 55
End: 2020-12-26

## 2020-12-26 ENCOUNTER — TRANSCRIPTION ENCOUNTER (OUTPATIENT)
Age: 55
End: 2020-12-26

## 2020-12-26 VITALS
RESPIRATION RATE: 16 BRPM | TEMPERATURE: 98 F | OXYGEN SATURATION: 97 % | DIASTOLIC BLOOD PRESSURE: 84 MMHG | HEART RATE: 64 BPM | SYSTOLIC BLOOD PRESSURE: 127 MMHG

## 2020-12-26 VITALS
RESPIRATION RATE: 16 BRPM | TEMPERATURE: 98 F | HEART RATE: 82 BPM | SYSTOLIC BLOOD PRESSURE: 142 MMHG | HEIGHT: 68 IN | DIASTOLIC BLOOD PRESSURE: 93 MMHG | OXYGEN SATURATION: 98 % | WEIGHT: 184.09 LBS

## 2020-12-26 DIAGNOSIS — Z90.49 ACQUIRED ABSENCE OF OTHER SPECIFIED PARTS OF DIGESTIVE TRACT: Chronic | ICD-10-CM

## 2020-12-26 DIAGNOSIS — Z98.890 OTHER SPECIFIED POSTPROCEDURAL STATES: Chronic | ICD-10-CM

## 2020-12-26 DIAGNOSIS — U07.1 COVID-19: ICD-10-CM

## 2020-12-26 DIAGNOSIS — Z98.84 BARIATRIC SURGERY STATUS: Chronic | ICD-10-CM

## 2020-12-26 LAB — SARS-COV-2 N GENE NPH QL NAA+PROBE: DETECTED

## 2020-12-26 PROCEDURE — M0239: CPT

## 2020-12-26 RX ORDER — BAMLANIVIMAB 35 MG/ML
700 INJECTION, SOLUTION INTRAVENOUS ONCE
Refills: 0 | Status: COMPLETED | OUTPATIENT
Start: 2020-12-26 | End: 2020-12-26

## 2020-12-26 RX ADMIN — BAMLANIVIMAB 200 MILLIGRAM(S): 35 INJECTION, SOLUTION INTRAVENOUS at 09:18

## 2020-12-26 NOTE — CHART NOTE - NSCHARTNOTEFT_GEN_A_CORE
CC: Monoclonal Antibody Infusion/COVID 19 Positive  55yMale with PMHx hypertension, Precancerous colon polyp, asthma, lung granulomas from [facility], s/p gastric bypass for weight loss, anemia, PTSD and frequent sinus infections, presenting with cough, sob and headache, Covid 19 pos on 12/22    exam/findings:  T(C): 36.8 (12-26-20 @ 08:53), Max: 36.8 (12-26-20 @ 08:53)  HR: 82 (12-26-20 @ 08:53) (82 - 82)  BP: --  RR: 16 (12-26-20 @ 08:53) (16 - 16)  SpO2: 98% (12-26-20 @ 08:53) (98% - 98%)      PE: sitting chair, tremor from feeling cold, room is very cold  Appearance: NAD	  HEENT:   Normal oral mucosa,   Lymphatic: No lymphadenopathy  Cardiovascular: Normal S1 S2, No JVD, No murmurs, No edema  Respiratory: Lungs clear to auscultation, no wheezes	  Gastrointestinal:  Soft, Non-tender, + BS	  Skin: warm and dry  Neurologic: Non-focal  Extremities: Normal range of motion, no edema or calf tenderness    ASSESSMENT:  Pt is a 54 yo male, Covid + 12/22 referred by Dr Justice Lama, who presents to infusion center for Monoclonal antibody infusion (Bamlanivimab)  Symptoms/ Criteria: cough, headache, sob  Risk Profile includes: hypertension, asthma    PLAN:  - infusion procedure explained to patient   -Consent for monoclonal antibody infusion obtained   - Risk & benifits discussed/all questions answered  -infuse  Bamlanivimab 700mg  IV over one hour   -observe patient for one hour post infusion        I have reviewed the Bamlanivimab Emergency Use Authorization (EAU) and I have provided the patient or patient's caregiver with the following information:  1. FDA has authorized emergency use of Bamlanivimab, which is not FDA-approved biologic product.  2. The patient or patient's caregiver has the option to accept or refuse administration of Bamlanivimab.  3. The significant known and benefits are unknown.  4. Information on available alternative treatments and risks and benefits of those alternatives.    Discharge:  Patient tolerated infusion well denies complaints of chest pain/SOB/dizziness/ palps  Vital signs --- for discharge home @ ---  D/C instructions given/ fact sheet included.  Patient to follow-up with PCP as needed. CC: Monoclonal Antibody Infusion/COVID 19 Positive  55yMale with PMHx hypertension, Precancerous colon polyp, asthma, lung granulomas from [facility], s/p gastric bypass for weight loss, anemia, PTSD and frequent sinus infections, presenting with cough, sob and headache, Covid 19 pos on 12/22    exam/findings:  T(C): 36.8 (12-26-20 @ 08:53), Max: 36.8 (12-26-20 @ 08:53)  HR: 82 (12-26-20 @ 08:53) (82 - 82)  BP: 142/93  RR: 16 (12-26-20 @ 08:53) (16 - 16)  SpO2: 98% (12-26-20 @ 08:53) (98% - 98%)      PE: sitting chair, tremor from feeling cold, room is very cold  Appearance: NAD	  HEENT:   Normal oral mucosa,   Lymphatic: No lymphadenopathy  Cardiovascular: Normal S1 S2, No JVD, No murmurs, No edema  Respiratory: Lungs clear to auscultation, no wheezes	  Gastrointestinal:  Soft, Non-tender, + BS	  Skin: warm and dry  Neurologic: Non-focal  Extremities: Normal range of motion, no edema or calf tenderness    ASSESSMENT:  Pt is a 56 yo male, Covid + 12/22 referred by Dr Justice Lama, who presents to infusion center for Monoclonal antibody infusion (Bamlanivimab)  Symptoms/ Criteria: cough, headache, sob  Risk Profile includes: hypertension, asthma    PLAN:  - infusion procedure explained to patient   -Consent for monoclonal antibody infusion obtained   - Risk & benifits discussed/all questions answered  -infuse  Bamlanivimab 700mg  IV over one hour   -observe patient for one hour post infusion        I have reviewed the Bamlanivimab Emergency Use Authorization (EAU) and I have provided the patient or patient's caregiver with the following information:  1. FDA has authorized emergency use of Bamlanivimab, which is not FDA-approved biologic product.  2. The patient or patient's caregiver has the option to accept or refuse administration of Bamlanivimab.  3. The significant known and benefits are unknown.  4. Information on available alternative treatments and risks and benefits of those alternatives.    Discharge:  Patient tolerated infusion well denies complaints of chest pain/SOB/dizziness/ palps  Vital signs --- for discharge home @ ---  D/C instructions given/ fact sheet included.  Patient to follow-up with PCP as needed. CC: Monoclonal Antibody Infusion/COVID 19 Positive  55yMale with PMHx hypertension, Precancerous colon polyp, asthma, lung granulomas from [facility], s/p gastric bypass for weight loss, anemia, PTSD and frequent sinus infections, presenting with cough, sob and headache, Covid 19 pos on 12/22    exam/findings:  T(C): 36.8 (12-26-20 @ 08:53), Max: 36.8 (12-26-20 @ 08:53)  HR: 82 (12-26-20 @ 08:53) (82 - 82)  BP: 142/93  RR: 16 (12-26-20 @ 08:53) (16 - 16)  SpO2: 98% (12-26-20 @ 08:53) (98% - 98%)      PE: sitting chair, tremor from feeling cold, room is very cold  Appearance: NAD	  HEENT:   Normal oral mucosa,   Lymphatic: No lymphadenopathy  Cardiovascular: Normal S1 S2, No JVD, No murmurs, No edema  Respiratory: Lungs clear to auscultation, no wheezes	  Gastrointestinal:  Soft, Non-tender, + BS	  Skin: warm and dry  Neurologic: Non-focal  Extremities: Normal range of motion, no edema or calf tenderness    ASSESSMENT:  Pt is a 56 yo male, Covid + 12/22 referred by Dr Justice Lama, who presents to infusion center for Monoclonal antibody infusion (Bamlanivimab)  Symptoms/ Criteria: cough, headache, sob  Risk Profile includes: hypertension, asthma    PLAN:  - infusion procedure explained to patient   -Consent for monoclonal antibody infusion obtained   - Risk & benifits discussed/all questions answered  -infuse  Bamlanivimab 700mg  IV over one hour   -observe patient for one hour post infusion        I have reviewed the Bamlanivimab Emergency Use Authorization (EAU) and I have provided the patient or patient's caregiver with the following information:  1. FDA has authorized emergency use of Bamlanivimab, which is not FDA-approved biologic product.  2. The patient or patient's caregiver has the option to accept or refuse administration of Bamlanivimab.  3. The significant known and benefits are unknown.  4. Information on available alternative treatments and risks and benefits of those alternatives.    Discharge:  Patient tolerated infusion well denies complaints of chest pain/SOB/dizziness/ palps, but felt as though he couldn't take a deep brath and wanted to take his inhaler, 1 puff of the inhaler and he felt better, we monitored him for another 10 minutes and he had not further complaints.  Vital signs stable for discharge home @ 11:30  D/C instructions given/ fact sheet included.  Patient to follow-up with PCP as needed.

## 2020-12-27 ENCOUNTER — TRANSCRIPTION ENCOUNTER (OUTPATIENT)
Age: 55
End: 2020-12-27

## 2020-12-28 ENCOUNTER — NON-APPOINTMENT (OUTPATIENT)
Age: 55
End: 2020-12-28

## 2020-12-29 ENCOUNTER — OUTPATIENT (OUTPATIENT)
Dept: OUTPATIENT SERVICES | Facility: HOSPITAL | Age: 55
LOS: 1 days | End: 2020-12-29
Payer: COMMERCIAL

## 2020-12-29 ENCOUNTER — APPOINTMENT (OUTPATIENT)
Dept: RADIOLOGY | Facility: CLINIC | Age: 55
End: 2020-12-29
Payer: COMMERCIAL

## 2020-12-29 DIAGNOSIS — Z98.890 OTHER SPECIFIED POSTPROCEDURAL STATES: Chronic | ICD-10-CM

## 2020-12-29 DIAGNOSIS — Z98.84 BARIATRIC SURGERY STATUS: Chronic | ICD-10-CM

## 2020-12-29 DIAGNOSIS — U07.1 COVID-19: ICD-10-CM

## 2020-12-29 DIAGNOSIS — R05 COUGH: ICD-10-CM

## 2020-12-29 DIAGNOSIS — Z90.49 ACQUIRED ABSENCE OF OTHER SPECIFIED PARTS OF DIGESTIVE TRACT: Chronic | ICD-10-CM

## 2020-12-29 PROCEDURE — 71046 X-RAY EXAM CHEST 2 VIEWS: CPT

## 2020-12-29 PROCEDURE — 71046 X-RAY EXAM CHEST 2 VIEWS: CPT | Mod: 26

## 2020-12-30 ENCOUNTER — NON-APPOINTMENT (OUTPATIENT)
Age: 55
End: 2020-12-30

## 2021-01-04 DIAGNOSIS — B37.0 CANDIDAL STOMATITIS: ICD-10-CM

## 2021-01-04 RX ORDER — NYSTATIN 100000 [USP'U]/ML
100000 SUSPENSION ORAL
Qty: 360 | Refills: 0 | Status: ACTIVE | COMMUNITY
Start: 2021-01-04 | End: 1900-01-01

## 2021-01-11 ENCOUNTER — APPOINTMENT (OUTPATIENT)
Dept: GASTROENTEROLOGY | Facility: CLINIC | Age: 56
End: 2021-01-11
Payer: COMMERCIAL

## 2021-01-11 VITALS
WEIGHT: 184 LBS | SYSTOLIC BLOOD PRESSURE: 110 MMHG | TEMPERATURE: 97.7 F | BODY MASS INDEX: 27.89 KG/M2 | HEIGHT: 68 IN | DIASTOLIC BLOOD PRESSURE: 80 MMHG

## 2021-01-11 DIAGNOSIS — D12.6 BENIGN NEOPLASM OF COLON, UNSPECIFIED: ICD-10-CM

## 2021-01-11 DIAGNOSIS — U07.1 COVID-19: ICD-10-CM

## 2021-01-11 DIAGNOSIS — R19.5 OTHER FECAL ABNORMALITIES: ICD-10-CM

## 2021-01-11 DIAGNOSIS — Z86.79 PERSONAL HISTORY OF OTHER DISEASES OF THE CIRCULATORY SYSTEM: ICD-10-CM

## 2021-01-11 PROCEDURE — 99214 OFFICE O/P EST MOD 30 MIN: CPT

## 2021-01-11 RX ORDER — ROPINIROLE 3 MG/1
3 TABLET, FILM COATED ORAL
Qty: 30 | Refills: 0 | Status: ACTIVE | COMMUNITY
Start: 2020-10-12

## 2021-01-11 RX ORDER — TEMAZEPAM 22.5 MG/1
22.5 CAPSULE ORAL
Qty: 30 | Refills: 0 | Status: ACTIVE | COMMUNITY
Start: 2020-09-11

## 2021-01-11 RX ORDER — DEXAMETHASONE 6 MG/1
6 TABLET ORAL DAILY
Qty: 10 | Refills: 0 | Status: DISCONTINUED | COMMUNITY
Start: 2020-12-22 | End: 2021-01-11

## 2021-01-11 RX ORDER — AZITHROMYCIN 500 MG/1
500 TABLET, FILM COATED ORAL
Qty: 5 | Refills: 0 | Status: DISCONTINUED | COMMUNITY
Start: 2020-12-29 | End: 2021-01-11

## 2021-01-11 RX ORDER — AMITRIPTYLINE HYDROCHLORIDE 10 MG/1
10 TABLET, FILM COATED ORAL 3 TIMES DAILY
Qty: 90 | Refills: 5 | Status: DISCONTINUED | OUTPATIENT
Start: 2020-01-23 | End: 2021-01-11

## 2021-01-11 RX ORDER — VERAPAMIL HYDROCHLORIDE 240 MG/1
240 TABLET ORAL
Qty: 90 | Refills: 0 | Status: ACTIVE | COMMUNITY
Start: 2020-07-15

## 2021-01-11 RX ORDER — DEXAMETHASONE 4 MG/1
4 TABLET ORAL DAILY
Qty: 20 | Refills: 0 | Status: DISCONTINUED | COMMUNITY
Start: 2020-12-28 | End: 2021-01-11

## 2021-01-11 RX ORDER — BENZONATATE 200 MG/1
200 CAPSULE ORAL 3 TIMES DAILY
Qty: 60 | Refills: 0 | Status: DISCONTINUED | COMMUNITY
Start: 2020-12-22 | End: 2021-01-11

## 2021-01-11 RX ORDER — OLOPATADINE HYDROCHLORIDE 665 UG/1
0.6 SPRAY, METERED NASAL
Qty: 3 | Refills: 1 | Status: DISCONTINUED | OUTPATIENT
Start: 2017-11-27 | End: 2021-01-11

## 2021-01-11 RX ORDER — SODIUM SULFATE, POTASSIUM SULFATE, MAGNESIUM SULFATE 17.5; 3.13; 1.6 G/ML; G/ML; G/ML
17.5-3.13-1.6 SOLUTION, CONCENTRATE ORAL
Qty: 1 | Refills: 0 | Status: DISCONTINUED | COMMUNITY
Start: 2020-02-12 | End: 2021-01-11

## 2021-01-11 RX ORDER — CYANOCOBALAMIN 500 UG/1
500 SPRAY NASAL
Qty: 1 | Refills: 0 | Status: ACTIVE | COMMUNITY
Start: 2020-11-09

## 2021-01-11 RX ORDER — NEOMYCIN SULFATE, POLYMYXIN B SULFATE AND DEXAMETHASONE 3.5; 10000; 1 MG/ML; [USP'U]/ML; MG/ML
3.5-10000-0.1 SUSPENSION OPHTHALMIC
Qty: 5 | Refills: 0 | Status: ACTIVE | COMMUNITY
Start: 2020-07-17

## 2021-01-11 NOTE — ASSESSMENT
[FreeTextEntry1] : Patient with a history of adenomatous colonic polyps including a large one removed in a piecemeal fashion from the hepatic flexure, reflux esophagitis with ongoing symptoms despite taking Dexilant and famotidine, and frequent loose stools with urgency.  The patient is currently recovering from COVID-19 infection.\par \par We will plan on performing EGD and colonoscopy at the end of March.  An EGD and colonoscopy will be scheduled. The risks, benefits, alternatives, and limitations of the procedures, including the possibility of missed lesions, were explained.  The patient will check with his pulmonary doctor to see if there are any concerns regarding ongoing lung problems from COVID-19.\par \par \par Plan from 6/10/2020 - Patient with evidence of reflux esophagitis on endoscopy.  He had tubular adenomas including a large one that was removed in piecemeal fashion and also has diverticulosis.\par \par I advised the patient to change the medication to Dexilant in the morning before breakfast and famotidine at bedtime.\par \par A list of dietary and lifestyle modifications in the treatment of GERD was given to the patient.\par \par Information was given to the patient regarding diverticulosis and a high-fiber diet.\par \par Given the piecemeal polypectomy, we will repeat a colonoscopy in March 2021.\par \par \par Plan from 2/12/2020 - Patient with a change in bowel habits with bloody/soft stools dating back to her surgeries last year.  He also has chronic reflux and is doing well on Dexilant.  He has a history of [place] [employer] exposure.\par \par An EGD and colonoscopy have been scheduled. The risks, benefits, alternatives, and limitations of the procedures, including the possibility of missed lesions, were explained.  The patient will require anesthesia clearance at [facility] prior to the procedure.\par \par Bloodwork was sent for CBC, chem-pack, TSH, celiac markers.\par \par Stool studies will be sent for a GI infectious PCR panel and C. diff by PCR.

## 2021-01-11 NOTE — CONSULT LETTER
[FreeTextEntry1] : Dear Dr. Christian Joyner and Dr. Justice Lama,\par \par I had the pleasure of seeing your patient ABHIJIT PETERS in the office today.  My office note is attached. PLEASE READ THE "ASSESSMENT" SECTION OF THE NOTE TO SEE MY IMPRESSION AND PLAN.\par \par Thank you very much for allowing me to participate in the care of your patient.\par \par Sincerely,\[facility] \par Bernardo Ramos M.D., FACG, FACP\par Director, Celiac Program at [facility]  of Medicine\[facility] [employer] at [facility] Practice Director,\[facility] - Gastroenterology/Internal Medicine at [place]\[address]\[place] [address]\par Tel: [phone]\[facility] Email: [email or web address]\par \par \[facility] The attached note has been created using a voice recognition system (Dragon).  There may be some misspellings and typos.  Please call my office if you have any issues or questions.

## 2021-01-11 NOTE — PHYSICAL EXAM
[General Appearance - Alert] : alert [General Appearance - In No Acute Distress] : in no acute distress [Neck Appearance] : the appearance of the neck was normal [Neck Cervical Mass (___cm)] : no neck mass was observed [Jugular Venous Distention Increased] : there was no jugular-venous distention [Thyroid Diffuse Enlargement] : the thyroid was not enlarged [Thyroid Nodule] : there were no palpable thyroid nodules [Auscultation Breath Sounds / Voice Sounds] : lungs were clear to auscultation bilaterally [Heart Rate And Rhythm] : heart rate was normal and rhythm regular [Heart Sounds] : normal S1 and S2 [Murmurs] : no murmurs [Heart Sounds Gallop] : no gallops [Heart Sounds Pericardial Friction Rub] : no pericardial rub [Edema] : there was no peripheral edema [Bowel Sounds] : normal bowel sounds [Abdomen Soft] : soft [Abdomen Tenderness] : non-tender [] : no hepato-splenomegaly [Abdomen Mass (___ Cm)] : no abdominal mass palpated [No CVA Tenderness] : no ~M costovertebral angle tenderness [Oriented To Time, Place, And Person] : oriented to person, place, and time [Impaired Insight] : insight and judgment were intact [Affect] : the affect was normal [FreeTextEntry1] : positive tenderness at surgical site

## 2021-01-11 NOTE — HISTORY OF PRESENT ILLNESS
[FreeTextEntry1] : The patient has a history of adenomatous colonic polyps with a large polyp removed in a piecemeal fashion from the hepatic flexure on his last colonoscopy.  He also has a history of reflux esophagitis and gastric bypass.  He is currently recovering from COVID-19.  He was diagnosed on December 22 and had shortness of breath.  He was not hospitalized but was treated with IV antibodies, dexamethasone, and Zithromax for 5 days.  He denies abdominal pain.  He gets heartburn 2-3 times a week despite taking Dexilant in the morning and famotidine at bedtime.  He moves his bowels about once a day but states that 50% of his stools are loose with urgency while the other 50% of solid.  He denies melena or bright red blood per rectum.  The patient has not been admitted to the hospital in the past year and denies any cardiac issues.\par \par \par Note from 6/10/2020 - We reviewed the evaluations done since the patient's last visit on February 12, 2020.  Blood work and stool testing was normal.  The patient underwent EGD and colonoscopy on March 10, 2020.  EGD revealed an irregular Z line with biopsy showing evidence of reflux esophagitis.  The patient is status post gastric bypass.  All the biopsies were negative.  Colonoscopy was significant for removal of 2 tubular adenomas including one that was large and flat at the hepatic flexure and required piecemeal polypectomy.  The patient also has diverticulosis in the transverse, descending, and sigmoid colon as well as internal and external hemorrhoids which are asymptomatic.  The patient was taking Dexilant in the evening and famotidine in the morning.  He notes heartburn/sour taste in the early morning and sometimes at night.  He works varying schedules and sometimes works nights .\par \par \par Note from 2/12/2020 - The patient is a 54-year-old man with a history of [place] exposure and a history of a gastric bypass as well as a lap band who underwent laminectomy and lumbar fusion in July 2019 complicated by postoperative infection and then underwent cervical disc replacement and fusion in December.  Following his surgeries in July the patient noticed a change in his bowel habits.  He is now having 2-3 bowel movements at a time approximately 3-4 times a week which is bloody/soft in consistency.  He denies melena or bright red blood per rectum.  Patient is on Dexilant for chronic reflux.  He denies heartburn, dysphasia, or abdominal pain.  He has gained a little weight.  The patient was hospitalized last year for the above surgeries and the postoperative infection.  He denies any cardiac history.

## 2021-01-12 DIAGNOSIS — Z20.822 CONTACT WITH AND (SUSPECTED) EXPOSURE TO COVID-19: ICD-10-CM

## 2021-01-12 DIAGNOSIS — Z01.818 ENCOUNTER FOR OTHER PREPROCEDURAL EXAMINATION: ICD-10-CM

## 2021-02-03 LAB — SARS-COV-2 N GENE NPH QL NAA+PROBE: NOT DETECTED

## 2021-02-05 ENCOUNTER — NON-APPOINTMENT (OUTPATIENT)
Age: 56
End: 2021-02-05

## 2021-02-05 ENCOUNTER — APPOINTMENT (OUTPATIENT)
Dept: GASTROENTEROLOGY | Facility: AMBULATORY MEDICAL SERVICES | Age: 56
End: 2021-02-05
Payer: COMMERCIAL

## 2021-02-05 PROCEDURE — 43239 EGD BIOPSY SINGLE/MULTIPLE: CPT

## 2021-02-05 PROCEDURE — 45380 COLONOSCOPY AND BIOPSY: CPT

## 2021-03-15 DIAGNOSIS — Z01.812 ENCOUNTER FOR PREPROCEDURAL LABORATORY EXAMINATION: ICD-10-CM

## 2021-03-23 ENCOUNTER — APPOINTMENT (OUTPATIENT)
Dept: GASTROENTEROLOGY | Facility: CLINIC | Age: 56
End: 2021-03-23
Payer: COMMERCIAL

## 2021-03-23 VITALS
SYSTOLIC BLOOD PRESSURE: 120 MMHG | HEART RATE: 71 BPM | DIASTOLIC BLOOD PRESSURE: 80 MMHG | HEIGHT: 68 IN | WEIGHT: 183 LBS | OXYGEN SATURATION: 98 % | BODY MASS INDEX: 27.74 KG/M2 | TEMPERATURE: 97.5 F

## 2021-03-23 PROCEDURE — 99213 OFFICE O/P EST LOW 20 MIN: CPT

## 2021-03-24 NOTE — HISTORY OF PRESENT ILLNESS
[FreeTextEntry1] : We reviewed the patient's EGD and colonoscopy performed on February 5, 2021.  EGD was significant for a 2 cm hiatal hernia.  The patient is status post gastric bypass.  Biopsies revealed intestinal metaplasia at the GE junction but were otherwise negative.  Colonoscopy was significant for removal of and appendiceal orifice polyp with negative biopsies.  The patient has mild to moderate diverticulosis involving the hepatic flexure, transverse colon, descending colon, and sigmoid.  Random right and left colonic biopsies were negative the patient also has internal and external hemorrhoids.  He is on Dexilant in the morning and famotidine at bedtime.  He states that 1-2 times a week, after his evening pills, he will get heartburn relieved with Tums.  He notes that his stools are improved with much less in the way of loose stools.  He is taking tramadol which constipates him.  He generally has 1 bowel movement a day and denies melena or bright red blood per rectum.  He has a history of adenomatous colonic polyps.\par \par \par Note from 1/11/2021 - The patient has a history of adenomatous colonic polyps with a large polyp removed in a piecemeal fashion from the hepatic flexure on his last colonoscopy.  He also has a history of reflux esophagitis and gastric bypass.  He is currently recovering from COVID-19.  He was diagnosed on December 22 and had shortness of breath.  He was not hospitalized but was treated with IV antibodies, dexamethasone, and Zithromax for 5 days.  He denies abdominal pain.  He gets heartburn 2-3 times a week despite taking Dexilant in the morning and famotidine at bedtime.  He moves his bowels about once a day but states that 50% of his stools are loose with urgency while the other 50% of solid.  He denies melena or bright red blood per rectum.  The patient has not been admitted to the hospital in the past year and denies any cardiac issues.\par \par \par Note from 6/10/2020 - We reviewed the evaluations done since the patient's last visit on February 12, 2020.  Blood work and stool testing was normal.  The patient underwent EGD and colonoscopy on March 10, 2020.  EGD revealed an irregular Z line with biopsy showing evidence of reflux esophagitis.  The patient is status post gastric bypass.  All the biopsies were negative.  Colonoscopy was significant for removal of 2 tubular adenomas including one that was large and flat at the hepatic flexure and required piecemeal polypectomy.  The patient also has diverticulosis in the transverse, descending, and sigmoid colon as well as internal and external hemorrhoids which are asymptomatic.  The patient was taking Dexilant in the evening and famotidine in the morning.  He notes heartburn/sour taste in the early morning and sometimes at night.  He works varying schedules and sometimes works nights .\par \par \par Note from 2/12/2020 - The patient is a 54-year-old man with a history of [place] [employer] exposure and a history of a gastric bypass as well as a lap band who underwent laminectomy and lumbar fusion in July 2019 complicated by postoperative infection and then underwent cervical disc replacement and fusion in December.  Following his surgeries in July the patient noticed a change in his bowel habits.  He is now having 2-3 bowel movements at a time approximately 3-4 times a week which is bloody/soft in consistency.  He denies melena or bright red blood per rectum.  Patient is on Dexilant for chronic reflux.  He denies heartburn, dysphasia, or abdominal pain.  He has gained a little weight.  The patient was hospitalized last year for the above surgeries and the postoperative infection.  He denies any cardiac history.

## 2021-03-24 NOTE — REASON FOR VISIT
[FreeTextEntry1] : Hiatal hernia, intestinal metaplasia at the GE junction, diverticulosis, hemorrhoids, history of adenomatous colonic polyps

## 2021-03-24 NOTE — PHYSICAL EXAM
[General Appearance - Alert] : alert [General Appearance - In No Acute Distress] : in no acute distress [Neck Appearance] : the appearance of the neck was normal [Neck Cervical Mass (___cm)] : no neck mass was observed [Jugular Venous Distention Increased] : there was no jugular-venous distention [Thyroid Diffuse Enlargement] : the thyroid was not enlarged [Thyroid Nodule] : there were no palpable thyroid nodules [Auscultation Breath Sounds / Voice Sounds] : lungs were clear to auscultation bilaterally [Heart Rate And Rhythm] : heart rate was normal and rhythm regular [Heart Sounds] : normal S1 and S2 [Heart Sounds Gallop] : no gallops [Murmurs] : no murmurs [Heart Sounds Pericardial Friction Rub] : no pericardial rub [Edema] : there was no peripheral edema [Bowel Sounds] : normal bowel sounds [Abdomen Soft] : soft [Abdomen Tenderness] : non-tender [] : no hepato-splenomegaly [Abdomen Mass (___ Cm)] : no abdominal mass palpated [No CVA Tenderness] : no ~M costovertebral angle tenderness [Oriented To Time, Place, And Person] : oriented to person, place, and time [Impaired Insight] : insight and judgment were intact [Affect] : the affect was normal [FreeTextEntry1] : positive tenderness at surgical site

## 2021-03-24 NOTE — ASSESSMENT
[FreeTextEntry1] : Patient with reflux with a 2 cm hiatal hernia and intestinal metaplasia at the GE junction found on endoscopy.  Colonoscopy was significant for diverticulosis.  He has a history of adenomatous colonic polyps.  He is on Dexilant in the morning and famotidine at bedtime.\par \par Patient will continue his current medications.\par \par Patient was counseled regarding the importance of a high-fiber diet.\par \par We will repeat EGD and colonoscopy in 3 years that is February 2024.\par \par Patient will return to see me in 1 year or sooner if needed.\par \par \par Plan from 1/11/2021 - Patient with a history of adenomatous colonic polyps including a large one removed in a piecemeal fashion from the hepatic flexure, reflux esophagitis with ongoing symptoms despite taking Dexilant and famotidine, and frequent loose stools with urgency.  The patient is currently recovering from COVID-19 infection.\par \par We will plan on performing EGD and colonoscopy at the end of March.  An EGD and colonoscopy will be scheduled. The risks, benefits, alternatives, and limitations of the procedures, including the possibility of missed lesions, were explained.  The patient will check with his pulmonary doctor to see if there are any concerns regarding ongoing lung problems from COVID-19.\par \par \par Plan from 6/10/2020 - Patient with evidence of reflux esophagitis on endoscopy.  He had tubular adenomas including a large one that was removed in piecemeal fashion and also has diverticulosis.\par \par I advised the patient to change the medication to Dexilant in the morning before breakfast and famotidine at bedtime.\par \par A list of dietary and lifestyle modifications in the treatment of GERD was given to the patient.\par \par Information was given to the patient regarding diverticulosis and a high-fiber diet.\par \par Given the piecemeal polypectomy, we will repeat a colonoscopy in March 2021.\par \par \par Plan from 2/12/2020 - Patient with a change in bowel habits with bloody/soft stools dating back to her surgeries last year.  He also has chronic reflux and is doing well on Dexilant.  He has a history of [place] [employer] exposure.\par \par An EGD and colonoscopy have been scheduled. The risks, benefits, alternatives, and limitations of the procedures, including the possibility of missed lesions, were explained.  The patient will require anesthesia clearance at [facility] prior to the procedure.\par \par Bloodwork was sent for CBC, chem-pack, TSH, celiac markers.\par \par Stool studies will be sent for a GI infectious PCR panel and C. diff by PCR.

## 2021-03-24 NOTE — CONSULT LETTER
[FreeTextEntry1] : Dear Dr. Christian Joyner and Dr. Justice Lama and Dr. Jeffrey Chirinos,\par \par I had the pleasure of seeing your patient ABHIJIT PETERS in the office today.  My office note is attached. PLEASE READ THE "ASSESSMENT" SECTION OF THE NOTE TO SEE MY IMPRESSION AND PLAN.\par \par Thank you very much for allowing me to participate in the care of your patient.\par \par Sincerely,\par \par Bernardo Ramos M.D., FACG, FACP\par Director, Celiac Program at [facility] [employer] [facility] Practice Director,\[facility] - Gastroenterology/Internal Medicine at [place]\[address] - [address]\[place] [address]\par Tel: [phone]\par Email: [email or web address] \[facility] \[facility] The attached note has been created using a voice recognition system (Dragon).  There may be some misspellings and typos.  Please call my office if you have any issues or questions.

## 2021-03-29 ENCOUNTER — APPOINTMENT (OUTPATIENT)
Dept: PULMONOLOGY | Facility: CLINIC | Age: 56
End: 2021-03-29
Payer: COMMERCIAL

## 2021-03-29 VITALS
HEART RATE: 80 BPM | OXYGEN SATURATION: 98 % | SYSTOLIC BLOOD PRESSURE: 118 MMHG | RESPIRATION RATE: 17 BRPM | BODY MASS INDEX: 27.74 KG/M2 | DIASTOLIC BLOOD PRESSURE: 80 MMHG | HEIGHT: 68 IN | WEIGHT: 183 LBS | TEMPERATURE: 97.4 F

## 2021-03-29 PROCEDURE — 94010 BREATHING CAPACITY TEST: CPT

## 2021-03-29 PROCEDURE — 94727 GAS DIL/WSHOT DETER LNG VOL: CPT

## 2021-03-29 PROCEDURE — 94618 PULMONARY STRESS TESTING: CPT

## 2021-03-29 PROCEDURE — 95012 NITRIC OXIDE EXP GAS DETER: CPT

## 2021-03-29 PROCEDURE — 94729 DIFFUSING CAPACITY: CPT

## 2021-03-29 PROCEDURE — ZZZZZ: CPT

## 2021-03-29 PROCEDURE — 99214 OFFICE O/P EST MOD 30 MIN: CPT | Mod: CS,25

## 2021-03-29 NOTE — PROCEDURE
[FreeTextEntry1] : CT Chest (3.4.21) reveals interval development of multiple bilateral peripheral and sub pleural ground glass opacities as well as right lower lobe ground glass nodule. These findings are compatible with an infectious etiology, including pneumonia/ pneumonitis from atypical agents. Chest CT follow up 3-6 months recommended. Interval resolution of previously noted new bilateral lower lobe nodules, with otherwise stable noncalcified nodules measuring up to 3 mm. there is no thoracic lymphadenopathy. \par \par Full PFT revealed normal flows, with a FEV1 of 3.75 L, which is 112 % of predicted, normal lung volumes, and a diffusion of 23.5, which is 98% of predicted, with a normal flow volume loop.\par \par 6 minute walk test reveals a low saturation of 97% with no evidence of dyspnea or fatigue; walked   423.8   meters.\par \par FENO was 6; a normal value being less than 25\par Fractional exhaled nitric oxide (FENO) is regarded as a simple, noninvasive method for assessing eosinophilic airway inflammation. Produced by a variety of cells within the lung, nitric oxide (NO) concentrations are generally low in healthy individuals. However, high concentrations of NO appear to be involved in nonspecific host defense mechanisms and chronic inflammatory diseases such as asthma. The American Thoracic Society (ATS) therefore has recommended using FENO to aid in the diagnosis and monitoring of eosinophilic airway inflammation and asthma, and for identifying steroid responsive individuals whose chronic respiratory symptoms may be caused by airway inflammation. \par \par -Images and procedures reviewed in detail and discussed with patient.

## 2021-03-29 NOTE — ASSESSMENT
[FreeTextEntry1] : **********************ALL PRINTED SCRIPTS TO BE FILLED THROUGH [facility] FUND*************************\par \par Mr. Corrales is a 55 year old male who has a history of asthma, allergy, GERD, cervicale spine Dz (s/p surgery 12/19)  and abnormal CT. He is s/p [place] exposure. He now presents to the office today for a follow up pulmonary visit, now s/p complicated cervical spine surgery (12/2019) now s/p COVID 19 12/2020- still symptomatic.\par \par problem 1: abnormal chest CT - c/w inflammation\par -PPD Quantiferon gold (-), ESR / Hypersensitivity panel (wnl)\par -Follow up Chest CT - 3/2021\par \par problem 2: asthma (active) \par -add Asmanex 200 2 inhalations BID\par -continue to use Symbicort 160 at 2 inhalations BID\par -continue Spiriva at 2 inhalation QD \par -continue to use Singulair 10 mg QHS \par -continue to use Ventolin PRN / Xopenex 0.63 via nebulizer q6H prn\par \par -Asthma is  believed to be caused by inherited (genetic) and environmental factor, but its exact cause is unknown. Asthma may be triggered by allergens, lung infections, or irritants in the air. Asthma triggers are different for each person\par -Inhaler technique reviewed as well as oral hygiene techniques reviewed with patient. Avoidance of cold air, extremes of temperature, rescue inhaler should be used before exercise. Order of medication reviewed with patient. Recommended use of a cool mist humidifier in the bedroom.\par \par problem 3: cough; ? Sensory neuropathic cough \par -continue Amitriptyline 10 mg up to TID, prn (DC if able)\par -continue Promethazine DM prn\par Sensory neuropathic cough is an etiology of cough that is often realized once someone has been ruled out for common disease such as: asthma, COPD, eosinophilic bronchitis, bronchiectasis, post nasal drip, and GERD. It sometimes develops following a URI, herpes zoster outbreak in pharynx or thyroid or cervical spine injury. However, many patients have no identifiable antecedent explanation. \par \par problem 4: allergic rhinitis \par -recommended to use "Navage" nasal rinse device \par -continue Claritin 10 mg QAM \par -continue to use Xyzal 5 mg QHS\par -continue to use Omnaris 1 sniff/nostril BID\par -continue Olopatadine 1 sniff each nostril BID (.6)\par -follow up with Dr. Ok Islas  - s/p turbinectomy \par -Environmental measures for allergies were encouraged including mattress and pillow cover, air purifier, and environmental controls.\par \par problem 5: GERD (FeeSST- c/w LPR)\par -continue to use Dexilant 60 mg before first meal\par -continue to use Pepcid 40 mg QHS \par \par -Rule of 2s: avoid eating too much, eating too late, eating too spicy, eating two hours before bed\par -Things to avoid including overeating, spicy foods, tight clothing, eating within three hours of bed, this list is not all inclusive. \par -For treatment of reflux, possible options discussed including diet control, H2 blockers, PPIs, as well as coating motility agents discussed as treatment options. Timing of meals and proximity of last meal to sleep were discussed. If symptoms persist, a formal gastrointestinal evaluation is needed. \par \par problem 6: insomnia\par - medical marijuana\par -continue to use Ambien PRN \par -recommended good sleep hygiene\par -Good sleep hygiene was encouraged including avoiding watching television an hour before bed, keeping caffeine at a low,  avoiding reading, television, or anything, in bed, no drinking any liquids three hours before bedtime, and only getting into bed when tired and ready for sleep.\par \par problem 7: foot cramps (resolved)\par -continue to use MyoCalm PM\par \par Problem 8a: s/p COVID 19 infection 12/2020 (residual Sx) \par -s/p MAB infusion \par -educated patient on COVID 19 vaccine and appropriate recommendations \par -vaccine pending\par COVID-19 precautionary Immune Support Recommendations:\par -OTC Vitamin C 1000mg BID \par -OTC Quercetin 500mg BID \par -OTC Zinc 50 mg per day \par -OTC Melatonin 5 mg a night \par -OTC Vitamin D 2000mg per day \par -OTC Tonic Water 8oz per day\par -Water 0.5-1 gallon per day\par \par Additional Support Supplements: \par -Liposomal Glutathione 500 mg BID\par -SPM 1 tablet BID \par -Berberine 1000 mg BID  \par - mg BID \par \par problem 8: health maintenance\par -received flu shot in - 9/2020\par -recommended strep pneumonia vaccines: Prevnar-13 vaccine, followed by Pneumo vaccine 23 on year following\par -recommended early intervention for URIs\par -recommended osteoporosis evaluations\par -recommended early dermatological evaluations\par -recommended after the age of 50 to the age of 70, colonoscopy every 5 years\par -encouraged early intervention\par \par \par F/U in 4 months\par He is encouraged to call with any changes, concerns, or questions. \par \par ***********************ALL PRINTED SCRIPTS TO BE FILLED THROUGH [facility] FUND************************************

## 2021-03-29 NOTE — PHYSICAL EXAM
[General Appearance - Well Developed] : well developed [Normal Appearance] : normal appearance [Well Groomed] : well groomed [General Appearance - Well Nourished] : well nourished [No Deformities] : no deformities [General Appearance - In No Acute Distress] : no acute distress [Normal Conjunctiva] : the conjunctiva exhibited no abnormalities [Eyelids - No Xanthelasma] : the eyelids demonstrated no xanthelasmas [Normal Oropharynx] : normal oropharynx [II] : II [Neck Appearance] : the appearance of the neck was normal [Neck Cervical Mass (___cm)] : no neck mass was observed [Jugular Venous Distention Increased] : there was no jugular-venous distention [Thyroid Diffuse Enlargement] : the thyroid was not enlarged [Thyroid Nodule] : there were no palpable thyroid nodules [Heart Rate And Rhythm] : heart rate and rhythm were normal [Heart Sounds] : normal S1 and S2 [Murmurs] : no murmurs present [Respiration, Rhythm And Depth] : normal respiratory rhythm and effort [Exaggerated Use Of Accessory Muscles For Inspiration] : no accessory muscle use [Auscultation Breath Sounds / Voice Sounds] : lungs were clear to auscultation bilaterally [Abdomen Soft] : soft [Abdomen Tenderness] : non-tender [Abdomen Mass (___ Cm)] : no abdominal mass palpated [Abnormal Walk] : normal gait [Gait - Sufficient For Exercise Testing] : the gait was sufficient for exercise testing [Nail Clubbing] : no clubbing of the fingernails [Cyanosis, Localized] : no localized cyanosis [Petechial Hemorrhages (___cm)] : no petechial hemorrhages [Skin Color & Pigmentation] : normal skin color and pigmentation [] : no rash [No Venous Stasis] : no venous stasis [Skin Lesions] : no skin lesions [No Skin Ulcers] : no skin ulcer [No Xanthoma] : no  xanthoma was observed [Deep Tendon Reflexes (DTR)] : deep tendon reflexes were 2+ and symmetric [Sensation] : the sensory exam was normal to light touch and pinprick [No Focal Deficits] : no focal deficits [Oriented To Time, Place, And Person] : oriented to person, place, and time [Impaired Insight] : insight and judgment were intact [Affect] : the affect was normal [FreeTextEntry1] : I:E ratio 1:3; mild forced expiratory wheeze\par

## 2021-03-29 NOTE — ADDENDUM
[FreeTextEntry1] : Documented by Ed Madsen acting as a scribe for Dr. Justice Lama on 03/29/2021.\par \par All medical record entries made by the Scribe were at my, Dr. Justice Lama's, direction and personally dictated by me on 03/29/2021 . I have reviewed the chart and agree that the record accurately reflects my personal performance of the history, physical exam, assessment and plan. I have also personally directed, reviewed, and agree with the discharge instructions. \par

## 2021-03-29 NOTE — HISTORY OF PRESENT ILLNESS
[FreeTextEntry1] : Mr. Corrales is a 55 year old male with a history of allergic rhinitis, asthma, abnormal chest CT, and GERD presenting to the office today for a follow up visit s/p cervical surgery in December.\par \par -he notes s/p COVID 19 infection 12/2020 with residual fatigued and SOB on exertion, HA with severity increasing to migraines intermittently, intermittent coughing fits\par -he notes arthralgias in hips with future THR pending in the future\par -he denies ankle swelling\par -he notes sleep quality poor\par -he notes sleep schedule shifted later\par -he notes sleeping 3-4 hours a night on average only alleviated with medical marijuana and Valium\par -he notes history of chronic fatigue still active\par -he notes supplementing with vitamin B12\par \par \par -denies any chest pain, chest pressure, diarrhea, constipation, dysphagia, sour taste in the mouth, dizziness, leg swelling, leg pain, myalgias, arthralgias, itchy eyes, itchy ears, heartburn, or reflux.\par \par
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
## 2021-06-14 ENCOUNTER — APPOINTMENT (OUTPATIENT)
Dept: PULMONOLOGY | Facility: CLINIC | Age: 56
End: 2021-06-14
Payer: COMMERCIAL

## 2021-06-14 VITALS
OXYGEN SATURATION: 97 % | RESPIRATION RATE: 16 BRPM | DIASTOLIC BLOOD PRESSURE: 68 MMHG | SYSTOLIC BLOOD PRESSURE: 110 MMHG | HEIGHT: 68 IN | HEART RATE: 75 BPM | BODY MASS INDEX: 27.28 KG/M2 | TEMPERATURE: 97.2 F | WEIGHT: 180 LBS

## 2021-06-14 DIAGNOSIS — J45.901 UNSPECIFIED ASTHMA WITH (ACUTE) EXACERBATION: ICD-10-CM

## 2021-06-14 PROCEDURE — 94729 DIFFUSING CAPACITY: CPT

## 2021-06-14 PROCEDURE — 94010 BREATHING CAPACITY TEST: CPT

## 2021-06-14 PROCEDURE — 99214 OFFICE O/P EST MOD 30 MIN: CPT | Mod: 25

## 2021-06-14 PROCEDURE — 94727 GAS DIL/WSHOT DETER LNG VOL: CPT

## 2021-06-14 PROCEDURE — ZZZZZ: CPT

## 2021-06-14 NOTE — ADDENDUM
[FreeTextEntry1] : Documented by George Sousa acting as a scribe for Dr. Justice Lama on (06/14/2021).\par \par All medical record entries made by the Scribe were at my, Dr. Justice Lama's, direction and personally dictated by me on (06/14/2021). I have reviewed the chart and agree that the record accurately reflects my personal performance of the history, physical exam, assessment and plan. I have also personally directed, reviewed, and agree with the discharge instructions.\par

## 2021-06-14 NOTE — HISTORY OF PRESENT ILLNESS
[FreeTextEntry1] : Mr. Corrales is a 55 year old male with a history of allergic rhinitis, asthma, abnormal chest CT, and GERD presenting to the office today for a follow up visit. His chief complaint is\par -he notes feeling well in general\par -he notes using the rescue inhaler a fair amount\par -he notes having some orthopedic issues\par -he notes he wants to get off the Pulmicort due to convenience and the nebulizer\par -he notes some constipation from the medication\par -he notes a decent amount of heartburn\par -he notes seeing Dr. Ramos who found a small hiatal hernia\par -he notes awaiting total hip replacement right side\par - S/p Covid 19 vaccine (Moderna) x2\par -he notes getting headaches since COVID\par -he notes his memory and brain fog have gotten worse since his COVID infection\par \par - patient denies any nausea, vomiting, fever, chills, sweats, chest pain, chest pressure, palpitations, coughing, wheezing, fatigue, diarrhea, dysphagia, myalgias, dizziness, leg swelling, leg pain, itchy eyes, itchy ears, reflux or sour taste in the mouth

## 2021-06-14 NOTE — PHYSICAL EXAM
[No Acute Distress] : no acute distress [Normal Oropharynx] : normal oropharynx [Normal Appearance] : normal appearance [Normal Rate/Rhythm] : normal rate/rhythm [No Neck Mass] : no neck mass [Normal S1, S2] : normal s1, s2 [No Murmurs] : no murmurs [No Resp Distress] : no resp distress [Clear to Auscultation Bilaterally] : clear to auscultation bilaterally [No Abnormalities] : no abnormalities [Benign] : benign [Normal Gait] : normal gait [No Clubbing] : no clubbing [No Cyanosis] : no cyanosis [No Edema] : no edema [FROM] : FROM [Normal Color/ Pigmentation] : normal color/ pigmentation [No Focal Deficits] : no focal deficits [Oriented x3] : oriented x3 [Normal Affect] : normal affect [II] : Mallampati Class: II [Kyphosis] : kyphosis [TextBox_68] : I:E 1:3; Clear

## 2021-06-14 NOTE — PROCEDURE
[FreeTextEntry1] : Full PFT revealed normal flows, with a FEV1 of 3.99L, which is 119% of predicted, normal lung volumes, and a diffusion of 25, which is 105% of predicted, with a normal flow volume loop\par \par CT (3/4/2021) reveals interval development of multiple bilateral peripheral and subpleural GG opacities as well as right lower lobe GG nodule. These findings are compatible with an infectious / inflammatory etiology, including pneumonia/pneumonitis from atypical agents. Chest CT f/u in 3-4 months recommended. Interval resolution of previously noted new bilateral LLL, with otherwise stable noncalcified nodules measuring up to 3mm. There is no thoracic lymphadenopathy. \par \par 6 minute walk test reveals a low saturation of 96% with no evidence of dyspnea or fatigue; walked 244.5 meters. Patient states pain in hip\par  \par \par -Images and procedures reviewed in detail and discussed with patient.

## 2021-06-14 NOTE — ASSESSMENT
[FreeTextEntry1] : **********************ALL PRINTED SCRIPTS TO BE FILLED THROUGH [facility]*************************\par \par Mr. Corrales is a 55 year old male who has a history of asthma, allergy, GERD, cervicale spine Dz (s/p surgery 12/19)  and abnormal CT. He is s/p WTC exposure. He now presents to the office today for a follow up pulmonary visit, now s/p complicated cervical spine surgery (12/2019) now s/p COVID 19 12/2020- awaiting THR (right)\par \par problem 1: abnormal chest CT - c/w inflammation - ?COVID-19 residue\par -PPD Quantiferon gold (-), ESR / Hypersensitivity panel (wnl)\par -Follow up Chest CT 3/2021 - next 9/2021\par \par problem 2: asthma\par -Continue Asmanex 200 2 inhalations BID\par -continue to use Symbicort 160 at 2 inhalations BID\par -continue Spiriva at 2 inhalation QD \par -continue to use Singulair 10 mg QHS \par -continue to use Xopenex PRN / Xopenex 0.63 via nebulizer q6H prn\par \par -Asthma is  believed to be caused by inherited (genetic) and environmental factor, but its exact cause is unknown. Asthma may be triggered by allergens, lung infections, or irritants in the air. Asthma triggers are different for each person\par -Inhaler technique reviewed as well as oral hygiene techniques reviewed with patient. Avoidance of cold air, extremes of temperature, rescue inhaler should be used before exercise. Order of medication reviewed with patient. Recommended use of a cool mist humidifier in the bedroom.\par \par problem 3: cough; ? Sensory neuropathic cough \par -continue Amitriptyline 10 mg up to TID, prn (DC if able)\par -continue Promethazine DM prn\par Sensory neuropathic cough is an etiology of cough that is often realized once someone has been ruled out for common disease such as: asthma, COPD, eosinophilic bronchitis, bronchiectasis, post nasal drip, and GERD. It sometimes develops following a URI, herpes zoster outbreak in pharynx or thyroid or cervical spine injury. However, many patients have no identifiable antecedent explanation. \par \par problem 4: allergic rhinitis \par -recommended to use "Navage" nasal rinse device \par -continue Claritin 10 mg QAM \par -continue to use Xyzal 5 mg QHS\par -continue to use Omnaris 1 sniff/nostril BID\par -continue Olopatadine 1 sniff each nostril BID (.6)\par -follow up with Dr. Ok Islas  - s/p turbinectomy \par -Environmental measures for allergies were encouraged including mattress and pillow cover, air purifier, and environmental controls.\par \par problem 5: GERD (FeeSST- c/w LPR)\par -continue to use Dexilant 60 mg before first meal\par -continue to use Pepcid 40 mg QHS \par \par -Rule of 2s: avoid eating too much, eating too late, eating too spicy, eating two hours before bed\par -Things to avoid including overeating, spicy foods, tight clothing, eating within three hours of bed, this list is not all inclusive. \par -For treatment of reflux, possible options discussed including diet control, H2 blockers, PPIs, as well as coating motility agents discussed as treatment options. Timing of meals and proximity of last meal to sleep were discussed. If symptoms persist, a formal gastrointestinal evaluation is needed. \par \par problem 6: insomnia\par - medical marijuana\par -continue to use Ambien PRN \par -recommended good sleep hygiene\par -Good sleep hygiene was encouraged including avoiding watching television an hour before bed, keeping caffeine at a low,  avoiding reading, television, or anything, in bed, no drinking any liquids three hours before bedtime, and only getting into bed when tired and ready for sleep.\par \par problem 7: foot cramps (resolved)\par -continue to use MyoCalm PM\par \par Problem 8a: s/p COVID 19 infection 12/2020 (residual Sx) \par - S/p Covid 19 vaccine (Moderna) x2 \par -s/p MAB infusion \par -educated patient on COVID 19 vaccine and appropriate recommendations \par -vaccine pending\par COVID-19 precautionary Immune Support Recommendations:\par -OTC Vitamin C 1000mg BID \par -OTC Quercetin 500mg BID \par -OTC Zinc 50 mg per day \par -OTC Melatonin 5 mg a night \par -OTC Vitamin D 2000mg per day \par -OTC Tonic Water 8oz per day\par -Water 0.5-1 gallon per day\par \par Additional Support Supplements: \par -Liposomal Glutathione 500 mg BID\par -SPM 1 tablet BID \par -Berberine 1000 mg BID  \par - mg BID \par \par problem 8: health maintenance\par -Pending right total hip replacement - at this point in time there are no absolute pulmonary contraindications to go forward with the planned procedure \par -received flu shot in - 9/2020\par -recommended strep pneumonia vaccines: Prevnar-13 vaccine, followed by Pneumo vaccine 23 on year following\par -recommended early intervention for URIs\par -recommended osteoporosis evaluations\par -recommended early dermatological evaluations\par -recommended after the age of 50 to the age of 70, colonoscopy every 5 years\par -encouraged early intervention\par \par \par F/U in 4 months\par He is encouraged to call with any changes, concerns, or questions. \par \par ***********************ALL PRINTED SCRIPTS TO BE FILLED THROUGH [facility]************************************

## 2021-07-19 NOTE — PRE-OP CHECKLIST - WARM FLUIDS/WARM BLANKETS
Below noted    Spoke to patient regarding lab results as noted below. Patient verbalized understanding of all.     
KAREN Heller M Np End Nurse Msg Pool    Thyroglobulin is undetectable (s/p total thyroidectomy for PTC), vitamin D is normal. Appears that patient has transitioned care to Dr Oliveira at [place] (had appointments 4/22/21 and 6/3/21). Continue care with new endocrinologist     
no

## 2021-08-30 ENCOUNTER — NON-APPOINTMENT (OUTPATIENT)
Age: 56
End: 2021-08-30

## 2021-09-15 LAB — SARS-COV-2 N GENE NPH QL NAA+PROBE: NOT DETECTED

## 2021-09-20 ENCOUNTER — APPOINTMENT (OUTPATIENT)
Dept: PULMONOLOGY | Facility: CLINIC | Age: 56
End: 2021-09-20
Payer: COMMERCIAL

## 2021-09-20 ENCOUNTER — NON-APPOINTMENT (OUTPATIENT)
Age: 56
End: 2021-09-20

## 2021-09-20 VITALS
RESPIRATION RATE: 16 BRPM | SYSTOLIC BLOOD PRESSURE: 120 MMHG | BODY MASS INDEX: 26.37 KG/M2 | WEIGHT: 174 LBS | TEMPERATURE: 97.2 F | OXYGEN SATURATION: 93 % | DIASTOLIC BLOOD PRESSURE: 80 MMHG | HEIGHT: 68 IN | HEART RATE: 93 BPM

## 2021-09-20 PROCEDURE — 99214 OFFICE O/P EST MOD 30 MIN: CPT | Mod: CS,25

## 2021-09-20 PROCEDURE — 94010 BREATHING CAPACITY TEST: CPT

## 2021-09-20 NOTE — ADDENDUM
[FreeTextEntry1] : Documented by Alana Pruett acting as a scribe for Dr. Justice Lama on 09/20/2021 \par \par All medical record entries made by the Scribe were at my, Dr. Justice Lama's, direction and personally dictated by me on 09/20/2021 . I have reviewed the chart and agree that the record accurately reflects my personal performance of the history, physical exam, assessment and plan. I have also personally directed, reviewed, and agree with the discharge instructions

## 2021-09-20 NOTE — PHYSICAL EXAM
[No Acute Distress] : no acute distress [Normal Oropharynx] : normal oropharynx [II] : Mallampati Class: II [Normal Appearance] : normal appearance [No Neck Mass] : no neck mass [Normal Rate/Rhythm] : normal rate/rhythm [Normal S1, S2] : normal s1, s2 [No Murmurs] : no murmurs [No Resp Distress] : no resp distress [Clear to Auscultation Bilaterally] : clear to auscultation bilaterally [No Abnormalities] : no abnormalities [Kyphosis] : kyphosis [Benign] : benign [Normal Gait] : normal gait [No Clubbing] : no clubbing [No Cyanosis] : no cyanosis [No Edema] : no edema [FROM] : FROM [Normal Color/ Pigmentation] : normal color/ pigmentation [No Focal Deficits] : no focal deficits [Oriented x3] : oriented x3 [Normal Affect] : normal affect [TextBox_68] : I:E 1:3; Clear  [TextBox_80] : kyphotic  [TextBox_105] : 1+ LE edema

## 2021-09-20 NOTE — PROCEDURE
[FreeTextEntry1] : PFT reveals normal flows, with an FEV1 of  3.63 L, which is 104 % of predicted, with normal flow volume loop \par \par CT (9.9.2021) revealed groundglass nodules mostly decreased with some resolved and some solid.  2-3 mm nodules \par

## 2021-09-20 NOTE — HISTORY OF PRESENT ILLNESS
[FreeTextEntry1] : Mr. Corrales is a 55 year old male with a history of allergic rhinitis, asthma, abnormal chest CT, and GERD presenting to the office today for a follow up visit. His chief complaint is\par \par -s/p hip done 3 weeks ago \par -he notes leg pain since surgery\par -he notes being undergoing therapy to take pressure off the hip when walking but still uncomfortable when walking \par -he notes intermittent reflux \par -he notes taking 5mg Oxycodone at night \par -he denies palpitations\par -he notes right ankle swelling since surgery \par -he notes intermittent coughing due to construction of house\par -he denies SOB\par -he denies SOB on back and at night\par -he denies sleeping well and has trouble staying asleep  \par -he notes intermittent headaches and brain fog \par -he notes s/p PET scan at [facility] due to cognitive issues \par -he notes memory decreased from baseline \par -he notes weight is stable\par -he notes diet worsened due to house construction \par -he denies any neck issues\par \par - He  denies any nausea, vomiting, fever, chills, sweats, chest pains, chest pressure, diarrhea, constipation, dysphagia, myalgia, dizziness, itchy eyes, itchy ears, heartburn, or sour taste in the mouth.

## 2021-09-20 NOTE — ASSESSMENT
[FreeTextEntry1] : **********************ALL PRINTED SCRIPTS TO BE FILLED THROUGH [facility]D*************************\par \par Mr. Corrales is a 55 year old male who has a history of asthma, allergy, GERD, cervicale spine Dz (s/p surgery 12/19)  and abnormal CT. He is s/p WTC exposure. He now presents to the office today for a follow up pulmonary visit, now s/p complicated cervical spine surgery (12/2019) now s/p COVID 19 12/2020- s/p THR (right) 5/2021- healing but in pain \par \par problem 1: abnormal chest CT - c/w inflammation - ?COVID-19 residue (improived \par -PPD Quantiferon gold (-), ESR / Hypersensitivity panel (wnl)\par -Follow up Chest CT 3/2021 - 9/2021, next 3/2022\par \par problem 2: asthma\par -Continue Asmanex 200 2 inhalations BID\par -continue to use Symbicort 160 at 2 inhalations BID\par -continue Spiriva at 2 inhalation QD \par -continue to use Singulair 10 mg QHS \par -continue to use Xopenex PRN / Xopenex 0.63 via nebulizer q6H prn\par \par -Asthma is  believed to be caused by inherited (genetic) and environmental factor, but its exact cause is unknown. Asthma may be triggered by allergens, lung infections, or irritants in the air. Asthma triggers are different for each person\par -Inhaler technique reviewed as well as oral hygiene techniques reviewed with patient. Avoidance of cold air, extremes of temperature, rescue inhaler should be used before exercise. Order of medication reviewed with patient. Recommended use of a cool mist humidifier in the bedroom.\par \par problem 3: cough; ? Sensory neuropathic cough \par -continue Amitriptyline 10 mg up to TID, prn (DC if able)\par -continue Promethazine DM prn\par Sensory neuropathic cough is an etiology of cough that is often realized once someone has been ruled out for common disease such as: asthma, COPD, eosinophilic bronchitis, bronchiectasis, post nasal drip, and GERD. It sometimes develops following a URI, herpes zoster outbreak in pharynx or thyroid or cervical spine injury. However, many patients have no identifiable antecedent explanation. \par \par problem 4: allergic rhinitis \par -recommended to use "Navage" nasal rinse device \par -continue Claritin 10 mg QAM \par -continue to use Xyzal 5 mg QHS\par -continue to use Omnaris 1 sniff/nostril BID\par -continue Olopatadine 1 sniff each nostril BID (.6)\par -follow up with Dr. Ok Islas  - s/p turbinectomy \par -Environmental measures for allergies were encouraged including mattress and pillow cover, air purifier, and environmental controls.\par \par problem 5: GERD (FeeSST- c/w LPR)\par -continue to use Dexilant 60 mg before first meal\par -continue to use Pepcid 40 mg QHS \par \par -Rule of 2s: avoid eating too much, eating too late, eating too spicy, eating two hours before bed\par -Things to avoid including overeating, spicy foods, tight clothing, eating within three hours of bed, this list is not all inclusive. \par -For treatment of reflux, possible options discussed including diet control, H2 blockers, PPIs, as well as coating motility agents discussed as treatment options. Timing of meals and proximity of last meal to sleep were discussed. If symptoms persist, a formal gastrointestinal evaluation is needed. \par \par problem 6: insomnia\par - medical marijuana\par -continue to use Ambien PRN \par -recommended good sleep hygiene\par -Good sleep hygiene was encouraged including avoiding watching television an hour before bed, keeping caffeine at a low,  avoiding reading, television, or anything, in bed, no drinking any liquids three hours before bedtime, and only getting into bed when tired and ready for sleep.\par \par problem 7: foot cramps (resolved)\par -continue to use MyoCalm PM\par \par Problem 8a: s/p COVID 19 infection 12/2020 (residual Sx) \par -Covid 19 preclusions, vaccine and booster discussed at length and recommended \par - S/p Covid 19 vaccine (Moderna) x2 \par -s/p MAB infusion \par -educated patient on COVID 19 vaccine and appropriate recommendations \par -vaccine pending\par COVID-19 precautionary Immune Support Recommendations:\par -OTC Vitamin C 1000mg BID \par -OTC Quercetin 500mg BID \par -OTC Zinc 50 mg per day \par -OTC Melatonin 5 mg a night \par -OTC Vitamin D 2000mg per day \par -OTC Tonic Water 8oz per day\par -Water 0.5-1 gallon per day\par \par Additional Support Supplements: \par -Liposomal Glutathione 500 mg BID\par -SPM 1 tablet BID \par -Berberine 1000 mg BID  \par - mg BID \par \par problem 8: health maintenance\par -PPD negative as of 9/20/2022\par -s/p right total hip replacement - at this point in time there are no absolute pulmonary contraindications to go forward with the planned procedure \par -received flu shot in - 9/2020\par -recommended strep pneumonia vaccines: Prevnar-13 vaccine, followed by Pneumo vaccine 23 on year following\par -recommended early intervention for URIs\par -recommended osteoporosis evaluations\par -recommended early dermatological evaluations\par -recommended after the age of 50 to the age of 70, colonoscopy every 5 years\par -encouraged early intervention\par \par \par F/U in 4 months\par He is encouraged to call with any changes, concerns, or questions. \par \par ***********************ALL PRINTED SCRIPTS TO BE FILLED THROUGH [facility]D************************************

## 2022-01-20 LAB — SARS-COV-2 N GENE NPH QL NAA+PROBE: NOT DETECTED

## 2022-01-24 ENCOUNTER — APPOINTMENT (OUTPATIENT)
Dept: PULMONOLOGY | Facility: CLINIC | Age: 57
End: 2022-01-24
Payer: COMMERCIAL

## 2022-01-24 VITALS
OXYGEN SATURATION: 98 % | WEIGHT: 166 LBS | DIASTOLIC BLOOD PRESSURE: 70 MMHG | HEART RATE: 84 BPM | TEMPERATURE: 97.7 F | HEIGHT: 68 IN | SYSTOLIC BLOOD PRESSURE: 120 MMHG | BODY MASS INDEX: 25.16 KG/M2 | RESPIRATION RATE: 16 BRPM

## 2022-01-24 PROCEDURE — 94729 DIFFUSING CAPACITY: CPT

## 2022-01-24 PROCEDURE — 94010 BREATHING CAPACITY TEST: CPT

## 2022-01-24 PROCEDURE — 95012 NITRIC OXIDE EXP GAS DETER: CPT

## 2022-01-24 PROCEDURE — ZZZZZ: CPT

## 2022-01-24 PROCEDURE — 99214 OFFICE O/P EST MOD 30 MIN: CPT | Mod: CS,25

## 2022-01-24 NOTE — HISTORY OF PRESENT ILLNESS
[FreeTextEntry1] : Mr. Corrales is a 55 year old male with a history of allergic rhinitis, asthma, abnormal chest CT, and GERD presenting to the office today for a follow up visit. His chief complaint is\par -he notes getting the COVID booster on 12/2021\par -he notes since then he has been having a productive cough with white sputum and HA\par -he notes Tylenol can help the HA but sometimes he needs Imitrex\par -he notes feeling slightly better but not like he was before the booster\par -he notes he has been using the Xopenex\par -he feels his breathing is not back to normal\par -he notes he is doing PT and feels SOB quickly\par -he notes his knee feels like his patella is displaced\par -he notes he has been using Imitrex 1-2x over the last week\par -he notes feeling SOB sometimes but the Xopenex has been helping\par -he denies wheezing\par -he notes his bowels are regular\par -he notes his reflux seems to be a little bit better\par -he notes since the booster, he has been lethargic\par -he notes his hip is fine but the muscle going down from the hip has been bothering him\par -s/p flu shot 9/2021\par \par -patient denies any nausea, vomiting, fever, chills, sweats, chest pain, chest pressure, palpitations, wheezing, fatigue, diarrhea, constipation, dysphagia, myalgias, dizziness, leg swelling, leg pain, itchy eyes, itchy ears, heartburn, or sour taste in the mouth

## 2022-01-24 NOTE — PHYSICAL EXAM

## 2022-01-24 NOTE — ADDENDUM
[FreeTextEntry1] : Documented by George Sousa acting as a scribe for Dr. Justice Lama on 01/24/2022\par \par All medical record entries made by the scribe were at my, Dr. Justice Lama's, direction and personally dictated by me on 01/24/2022. I have reviewed the chart and agree that the record accurately reflects my personal performance of the history, physical exam, assessment, and plan. I have also personally directed, reviewed, and agree with the discharge instructions.

## 2022-01-24 NOTE — PROCEDURE
[FreeTextEntry1] : FENO was 11; a normal value being less than 25. Fractional exhaled nitric oxide (FENO) is regarded as a simple, noninvasive method for assessing eosinophilic airway inflammation. Produced by a variety of cells within the lung, nitric oxide (NO) concentrations are generally low in healthy individuals. However, high concentrations of NO appear to be involved in nonspecific host defense mechanisms and chronic inflammatory  diseases such as asthma. The American Thoracic Society (ATS) therefore recommended using FENO to aid in the diagnosis and monitoring of eosinophilic airway inflammation and asthma, and for identifying steroid responsive individuals whose chronic respiratory symptoms may be caused by airway inflammation \par \par Full PFT revealed normal flows, with a FEV1 of 4.19L, which is 126% of predicted, normal lung volumes, and a diffusion of 29.4, which is 130% of predicted, with a flattened inspiratory limb\par \par -Images and procedures reviewed in detail and discussed with patient.

## 2022-01-24 NOTE — ASSESSMENT
[FreeTextEntry1] : **********************ALL PRINTED SCRIPTS TO BE FILLED THROUGH [facility]*************************\par \par Mr. Corrales is a 56 year old male who has a history of asthma, allergy, GERD, cervicale spine Dz (s/p surgery 12/19)  and abnormal CT. He is s/p WTC exposure. He now presents to the office today for a follow up pulmonary visit, now s/p complicated cervical spine surgery (12/2019) now s/p COVID 19 12/2020- s/p THR (right) 5/2021- now ?COVID-19 vaccine reaction 12/2021\par \par problem 1: abnormal chest CT - c/w inflammation - ?COVID-19 residue (improived \par -PPD Quantiferon gold (-), ESR / Hypersensitivity panel (wnl)\par -Follow up Chest CT 3/2021 - 9/2021, next 3/2022\par \par problem 2: asthma\par -Add Breztri 2 puffs BID \par -Continue Asmanex 200 2 inhalations BID\par -continue to use Symbicort 160 at 2 inhalations BID\par -continue Spiriva at 2 inhalation QD \par -continue to use Singulair 10 mg QHS \par -continue to use Xopenex PRN / Xopenex 0.63 via nebulizer q6H prn\par \par -Asthma is  believed to be caused by inherited (genetic) and environmental factor, but its exact cause is unknown. Asthma may be triggered by allergens, lung infections, or irritants in the air. Asthma triggers are different for each person\par -Inhaler technique reviewed as well as oral hygiene techniques reviewed with patient. Avoidance of cold air, extremes of temperature, rescue inhaler should be used before exercise. Order of medication reviewed with patient. Recommended use of a cool mist humidifier in the bedroom.\par \par problem 3: cough; ? Sensory neuropathic cough \par -continue Amitriptyline 10 mg up to TID, prn (DC if able)\par -continue Promethazine DM prn\par Sensory neuropathic cough is an etiology of cough that is often realized once someone has been ruled out for common disease such as: asthma, COPD, eosinophilic bronchitis, bronchiectasis, post nasal drip, and GERD. It sometimes develops following a URI, herpes zoster outbreak in pharynx or thyroid or cervical spine injury. However, many patients have no identifiable antecedent explanation. \par \par problem 4: allergic rhinitis \par -recommended to use "Navage" nasal rinse device \par -continue Claritin 10 mg QAM \par -continue to use Xyzal 5 mg QHS\par -continue to use Omnaris 1 sniff/nostril BID\par -continue Olopatadine 1 sniff each nostril BID (.6)\par -follow up with Dr. Ok Islas  - s/p turbinectomy \par -Environmental measures for allergies were encouraged including mattress and pillow cover, air purifier, and environmental controls.\par \par problem 5: GERD (FeeSST- c/w LPR)\par -continue to use Dexilant 60 mg before first meal\par -continue to use Pepcid 40 mg QHS \par \par -Rule of 2s: avoid eating too much, eating too late, eating too spicy, eating two hours before bed\par -Things to avoid including overeating, spicy foods, tight clothing, eating within three hours of bed, this list is not all inclusive. \par -For treatment of reflux, possible options discussed including diet control, H2 blockers, PPIs, as well as coating motility agents discussed as treatment options. Timing of meals and proximity of last meal to sleep were discussed. If symptoms persist, a formal gastrointestinal evaluation is needed. \par \par problem 6: insomnia\par - medical marijuana\par -continue to use Ambien PRN \par -recommended good sleep hygiene\par -Good sleep hygiene was encouraged including avoiding watching television an hour before bed, keeping caffeine at a low,  avoiding reading, television, or anything, in bed, no drinking any liquids three hours before bedtime, and only getting into bed when tired and ready for sleep.\par \par problem 7: foot cramps (resolved)\par -continue to use MyoCalm PM\par \par Problem 8a: s/p COVID 19 infection 12/2020 (residual Sx) \par -Covid 19 preclusions, vaccine and booster discussed at length and recommended \par - S/p Covid 19 vaccine (Moderna) x3 (12/2021)\par -s/p MAB infusion \par -educated patient on COVID 19 vaccine and appropriate recommendations \par -vaccine pending\par COVID-19 precautionary Immune Support Recommendations:\par -OTC Vitamin C 1000mg BID \par -OTC Quercetin 500mg BID \par -OTC Zinc 50 mg per day \par -OTC Melatonin 5 mg a night \par -OTC Vitamin D 2000mg per day \par -OTC Tonic Water 8oz per day\par -Water 0.5-1 gallon per day\par \par Additional Support Supplements: \par -Liposomal Glutathione 500 mg BID\par -SPM 1 tablet BID \par -Berberine 1000 mg BID  \par - mg BID \par \par problem 8: health maintenance\par -PPD negative as of 9/20/2021\par -received flu shot in - 9/2021\par -recommended strep pneumonia vaccines: Prevnar-13 vaccine, followed by Pneumo vaccine 23 on year following\par -recommended early intervention for URIs\par -recommended osteoporosis evaluations\par -recommended early dermatological evaluations\par -recommended after the age of 50 to the age of 70, colonoscopy every 5 years\par -encouraged early intervention\par \par \par F/U in 4 months\par He is encouraged to call with any changes, concerns, or questions. \par \par ***********************ALL PRINTED SCRIPTS TO BE FILLED THROUGH [facility]************************************

## 2022-04-20 NOTE — DISCHARGE NOTE PROVIDER - [facility]_MEDRECSTATUS_GEN_ALL_CORE
Admission Reconciliation is Completed  Discharge Reconciliation is Not Complete Admission Reconciliation is Completed  Discharge Reconciliation is Completed Azathioprine Counseling:  I discussed with the patient the risks of azathioprine including but not limited to myelosuppression, immunosuppression, hepatotoxicity, lymphoma, and infections.  The patient understands that monitoring is required including baseline LFTs, Creatinine, possible TPMP genotyping and weekly CBCs for the first month and then every 2 weeks thereafter.  The patient verbalized understanding of the proper use and possible adverse effects of azathioprine.  All of the patient's questions and concerns were addressed.

## 2022-04-23 ENCOUNTER — RX RENEWAL (OUTPATIENT)
Age: 57
End: 2022-04-23

## 2022-05-25 ENCOUNTER — NON-APPOINTMENT (OUTPATIENT)
Age: 57
End: 2022-05-25

## 2022-05-25 ENCOUNTER — APPOINTMENT (OUTPATIENT)
Dept: PULMONOLOGY | Facility: CLINIC | Age: 57
End: 2022-05-25
Payer: COMMERCIAL

## 2022-05-25 VITALS
TEMPERATURE: 96.5 F | OXYGEN SATURATION: 99 % | WEIGHT: 182 LBS | DIASTOLIC BLOOD PRESSURE: 74 MMHG | BODY MASS INDEX: 27.58 KG/M2 | RESPIRATION RATE: 16 BRPM | HEIGHT: 68 IN | HEART RATE: 77 BPM | SYSTOLIC BLOOD PRESSURE: 110 MMHG

## 2022-05-25 PROCEDURE — 94010 BREATHING CAPACITY TEST: CPT

## 2022-05-25 PROCEDURE — 99214 OFFICE O/P EST MOD 30 MIN: CPT | Mod: CS,25

## 2022-05-25 PROCEDURE — 95012 NITRIC OXIDE EXP GAS DETER: CPT

## 2022-05-25 NOTE — HISTORY OF PRESENT ILLNESS
[FreeTextEntry1] : Mr. Corrales is a 56 year old male with a history of allergic rhinitis, asthma, abnormal chest CT, and GERD presenting to the office today for a follow up visit. His chief complaint is\par -he notes that he was attempting to do yard work and had severe wheezing\par -he notes a little constipation which he believes is medication related\par -he notes a constant sour taste in the mouth\par -he notes taking famotidine \par -he notes he sleeps elevated\par -he notes he got heartburn at times when he woke up in the morning\par -he notes that he picked up a dog (75 lbs) and he felt like his hip replacement was going to pop out\par -s/p covid 19 vaccine x3 \par \par -patient denies any headaches, nausea, vomiting, fever, chills, sweats, chest pain, chest pressure, palpitations, coughing, fatigue, diarrhea, dysphagia, myalgias, dizziness, leg swelling, leg pain, itchy eyes, itchy ears, reflux

## 2022-05-25 NOTE — PHYSICAL EXAM
[No Acute Distress] : no acute distress [Normal Oropharynx] : normal oropharynx [Normal Appearance] : normal appearance [No Neck Mass] : no neck mass [Normal Rate/Rhythm] : normal rate/rhythm [Normal S1, S2] : normal s1, s2 [No Murmurs] : no murmurs [No Resp Distress] : no resp distress [Clear to Auscultation Bilaterally] : clear to auscultation bilaterally [No Abnormalities] : no abnormalities [Benign] : benign [Normal Gait] : normal gait [No Clubbing] : no clubbing [No Cyanosis] : no cyanosis [No Edema] : no edema [FROM] : FROM [Normal Color/ Pigmentation] : normal color/ pigmentation [No Focal Deficits] : no focal deficits [Oriented x3] : oriented x3 [Normal Affect] : normal affect [I] : Mallampati Class: I [TextBox_68] : I:E 1:3; mild expiratory wheezes [TextBox_105] : 1+ LE edema

## 2022-05-25 NOTE — ADDENDUM
[FreeTextEntry1] : Documented by Imtiaz Sousa acting as a scribe for Dr. Justice Lama on 05/25/2022.\par \par All medical record entries made by the Scribe were at my, Dr. Justice Lama's, direction and personally dictated by me on 05/25/2022. I have reviewed the chart and agree that the record accurately reflects my personal performance of the history, physical exam, assessment and plan. I have also personally directed, reviewed, and agree with the discharge instructions.

## 2022-05-25 NOTE — ASSESSMENT
[FreeTextEntry1] : **********************ALL PRINTED SCRIPTS TO BE FILLED THROUGH [facility]*************************\par \par Mr. Corrales is a 56 year old male who has a history of asthma, allergy, GERD, cervicale spine Dz (s/p surgery 12/19)  and abnormal CT. He is s/p WTC exposure. He now presents to the office today for a follow up pulmonary visit, now s/p complicated cervical spine surgery (12/2019) now s/p COVID 19 12/2020- s/p THR (right) 5/2021- now ?COVID-19 vaccine reaction 12/2021 - ?TBM\par \par problem 1: abnormal chest CT - c/w inflammation - ?COVID-19 residue (improived \par -PPD Quantiferon gold (-), ESR / Hypersensitivity panel (wnl)\par -Follow up Chest CT 3/2021 - 9/2021, 3/2022, next 3/2023\par \par problem 2: asthma\par -continue Breztri 2 puffs BID = Symbicort/Spiriva/Asmanex \par -Continue Asmanex 200 2 inhalations BID\par -continue to use Symbicort 160 at 2 inhalations BID\par -continue Spiriva at 2 inhalation QD \par -continue to use Singulair 10 mg QHS \par -continue to use Xopenex PRN / Xopenex 0.63 via nebulizer q6H prn\par -Asthma is  believed to be caused by inherited (genetic) and environmental factor, but its exact cause is unknown. Asthma may be triggered by allergens, lung infections, or irritants in the air. Asthma triggers are different for each person\par -Inhaler technique reviewed as well as oral hygiene techniques reviewed with patient. Avoidance of cold air, extremes of temperature, rescue inhaler should be used before exercise. Order of medication reviewed with patient. Recommended use of a cool mist humidifier in the bedroom.\par \par problem 3: cough; ? Sensory neuropathic cough \par -off Amitriptyline 10 mg up to TID, prn (DC if able)\par -Add Neurontin 100 Q8H \par -continue Promethazine DM prn\par Sensory neuropathic cough is an etiology of cough that is often realized once someone has been ruled out for common disease such as: asthma, COPD, eosinophilic bronchitis, bronchiectasis, post nasal drip, and GERD. It sometimes develops following a URI, herpes zoster outbreak in pharynx or thyroid or cervical spine injury. However, many patients have no identifiable antecedent explanation. \par \par Problem 3A: ?TBM\par -complete Dynamic CT\par -Tracheomalacia is usually acquired in adults and common causes include damage by tracheostomy or endotracheal intubation damaging the tracheal cartilage with increase risk with multiple intubations, prolonged intubation, and concurrent high dose steroid therapy; external chest wall trauma and surgery; chronic compression of the trachea by benign etiologies (eg, benign mediastinal goiter) or malignancy; relapsing polychondritis; or recurrent infection. Tracheomalacia can be asymptomatic, however signs or symptoms can develop as the severity of the airway narrowing progresses with major symptoms include dyspnea, cough, and sputum retention. Other symptoms include severe paroxysms of coughing, wheezing or stridor, barking cough and may be exacerbated by forced expiration, cough, and valsalva maneuver. Tracheomalacia is diagnosed by a bronchoscopic visualization of dynamic airway collapse on dynamic chest CT. Therapy is warranted in symptomatic patients with severe tracheomalacia and includes surgical repair as tracheobronchoplasty. The patient was referred to Dr. Alvin Stubbs or Dr. Vinny Lilly, at [facility] for a surgical consult. \par \par problem 4: allergic rhinitis \par -recommended to use "Navage" nasal rinse device \par -continue Claritin 10 mg QAM \par -continue to use Xyzal 5 mg QHS\par -continue to use Omnaris 1 sniff/nostril BID\par -continue Olopatadine 1 sniff each nostril BID (.6)\par -follow up with Dr. Ok Islas  - s/p turbinectomy \par -Environmental measures for allergies were encouraged including mattress and pillow cover, air purifier, and environmental controls.\par \par problem 5: GERD (FeeSST- c/w LPR)\par -continue to use Dexilant 60 mg before first meal\par -continue to use Pepcid 40 mg QHS \par -Rule of 2s: avoid eating too much, eating too late, eating too spicy, eating two hours before bed\par -Things to avoid including overeating, spicy foods, tight clothing, eating within three hours of bed, this list is not all inclusive. \par -For treatment of reflux, possible options discussed including diet control, H2 blockers, PPIs, as well as coating motility agents discussed as treatment options. Timing of meals and proximity of last meal to sleep were discussed. If symptoms persist, a formal gastrointestinal evaluation is needed. \par \par problem 6: insomnia\par - medical marijuana\par -continue to use Ambien PRN \par -recommended good sleep hygiene\par -Good sleep hygiene was encouraged including avoiding watching television an hour before bed, keeping caffeine at a low,  avoiding reading, television, or anything, in bed, no drinking any liquids three hours before bedtime, and only getting into bed when tired and ready for sleep.\par \par problem 7: foot cramps (resolved)\par -continue to use MyoCalm PM\par \par Problem 8a: s/p COVID 19 infection 12/2020 (residual Sx) \par -Recommended not to get an additional COVID-19 booster at this time until it is updated against the current variants. If planning on travelling, obtaining another booster within 2 weeks of departure is recommended. \par -Covid 19 preclusions, vaccine and booster discussed at length and recommended \par - S/p Covid 19 vaccine (Moderna) x3 (12/2021)\par -s/p MAB infusion \par -educated patient on COVID 19 vaccine and appropriate recommendations \par -vaccine pending\par COVID-19 precautionary Immune Support Recommendations:\par -OTC Vitamin C 1000mg BID \par -OTC Quercetin 500mg BID \par -OTC Zinc 50 mg per day \par -OTC Melatonin 5 mg a night \par -OTC Vitamin D 2000mg per day \par -OTC Tonic Water 8oz per day\par -Water 0.5-1 gallon per day\par Additional Support Supplements: \par -Liposomal Glutathione 500 mg BID\par -SPM 1 tablet BID \par -Berberine 1000 mg BID  \par - mg BID \par \par problem 8: health maintenance\par -PPD negative as of 9/20/2021\par -received flu shot in - 9/2021\par -recommended strep pneumonia vaccines: Prevnar-13 vaccine, followed by Pneumo vaccine 23 on year following\par -recommended early intervention for URIs\par -recommended osteoporosis evaluations\par -recommended early dermatological evaluations\par -recommended after the age of 50 to the age of 70, colonoscopy every 5 years\par -encouraged early intervention\par \par \par F/U in 4 months\par He is encouraged to call with any changes, concerns, or questions. \par \par ***********************ALL PRINTED SCRIPTS TO BE FILLED THROUGH [facility]D************************************

## 2022-05-25 NOTE — PROCEDURE
[FreeTextEntry1] : Feno was <5; a normal value being less than 25. Fractional exhaled nitric oxide (FENO) is regarded as a simple, noninvasive method for assessing eosinophilic airway inflammation. Produced by a variety of cells within the lung, nitric oxide (NO) concentrations are generally low in healthy individuals. However, high concentrations of NO appear to be involved in nonspecific host defense mechanisms and chronic inflammatory  diseases such as asthma. The American Thoracic Society (ATS) therefore recommended using FENO to aid in the diagnosis and monitoring of eosinophilic airway inflammation and asthma, and for identifying steroid responsive individuals whose chronic respiratory symptoms may be caused by airway inflammation \par \par PFT revealed normal flows, with a FEV1 of 3.93L, which is 114% of predicted, with a normal flow volume loop

## 2022-05-27 ENCOUNTER — TRANSCRIPTION ENCOUNTER (OUTPATIENT)
Age: 57
End: 2022-05-27

## 2022-07-01 ENCOUNTER — APPOINTMENT (OUTPATIENT)
Dept: CT IMAGING | Facility: CLINIC | Age: 57
End: 2022-07-01

## 2022-07-01 ENCOUNTER — OUTPATIENT (OUTPATIENT)
Dept: OUTPATIENT SERVICES | Facility: HOSPITAL | Age: 57
LOS: 1 days | End: 2022-07-01
Payer: COMMERCIAL

## 2022-07-01 DIAGNOSIS — Z00.8 ENCOUNTER FOR OTHER GENERAL EXAMINATION: ICD-10-CM

## 2022-07-01 DIAGNOSIS — Z98.890 OTHER SPECIFIED POSTPROCEDURAL STATES: Chronic | ICD-10-CM

## 2022-07-01 DIAGNOSIS — R93.89 ABNORMAL FINDINGS ON DIAGNOSTIC IMAGING OF OTHER SPECIFIED BODY STRUCTURES: ICD-10-CM

## 2022-07-01 DIAGNOSIS — Z90.49 ACQUIRED ABSENCE OF OTHER SPECIFIED PARTS OF DIGESTIVE TRACT: Chronic | ICD-10-CM

## 2022-07-01 DIAGNOSIS — Z00.00 ENCOUNTER FOR GENERAL ADULT MEDICAL EXAMINATION WITHOUT ABNORMAL FINDINGS: ICD-10-CM

## 2022-07-01 DIAGNOSIS — J32.9 CHRONIC SINUSITIS, UNSPECIFIED: ICD-10-CM

## 2022-07-01 DIAGNOSIS — J39.8 OTHER SPECIFIED DISEASES OF UPPER RESPIRATORY TRACT: ICD-10-CM

## 2022-07-01 DIAGNOSIS — Z98.84 BARIATRIC SURGERY STATUS: Chronic | ICD-10-CM

## 2022-07-01 PROCEDURE — 70486 CT MAXILLOFACIAL W/O DYE: CPT

## 2022-07-01 PROCEDURE — 70486 CT MAXILLOFACIAL W/O DYE: CPT | Mod: 26

## 2022-07-29 ENCOUNTER — RX RENEWAL (OUTPATIENT)
Age: 57
End: 2022-07-29

## 2022-08-29 ENCOUNTER — TRANSCRIPTION ENCOUNTER (OUTPATIENT)
Age: 57
End: 2022-08-29

## 2022-09-01 ENCOUNTER — RESULT REVIEW (OUTPATIENT)
Age: 57
End: 2022-09-01

## 2022-09-01 ENCOUNTER — APPOINTMENT (OUTPATIENT)
Dept: CT IMAGING | Facility: CLINIC | Age: 57
End: 2022-09-01

## 2022-09-01 ENCOUNTER — OUTPATIENT (OUTPATIENT)
Dept: OUTPATIENT SERVICES | Facility: HOSPITAL | Age: 57
LOS: 1 days | End: 2022-09-01
Payer: COMMERCIAL

## 2022-09-01 DIAGNOSIS — Z00.8 ENCOUNTER FOR OTHER GENERAL EXAMINATION: ICD-10-CM

## 2022-09-01 DIAGNOSIS — Z98.890 OTHER SPECIFIED POSTPROCEDURAL STATES: Chronic | ICD-10-CM

## 2022-09-01 DIAGNOSIS — R93.89 ABNORMAL FINDINGS ON DIAGNOSTIC IMAGING OF OTHER SPECIFIED BODY STRUCTURES: ICD-10-CM

## 2022-09-01 DIAGNOSIS — Z90.49 ACQUIRED ABSENCE OF OTHER SPECIFIED PARTS OF DIGESTIVE TRACT: Chronic | ICD-10-CM

## 2022-09-01 DIAGNOSIS — Z98.84 BARIATRIC SURGERY STATUS: Chronic | ICD-10-CM

## 2022-09-01 DIAGNOSIS — J45.909 UNSPECIFIED ASTHMA, UNCOMPLICATED: ICD-10-CM

## 2022-09-01 PROCEDURE — 71250 CT THORAX DX C-: CPT

## 2022-09-01 PROCEDURE — 71250 CT THORAX DX C-: CPT | Mod: 26

## 2022-09-23 ENCOUNTER — APPOINTMENT (OUTPATIENT)
Dept: PULMONOLOGY | Facility: CLINIC | Age: 57
End: 2022-09-23

## 2022-09-23 ENCOUNTER — NON-APPOINTMENT (OUTPATIENT)
Age: 57
End: 2022-09-23

## 2022-09-23 VITALS
OXYGEN SATURATION: 98 % | BODY MASS INDEX: 26.52 KG/M2 | DIASTOLIC BLOOD PRESSURE: 75 MMHG | TEMPERATURE: 97.1 F | WEIGHT: 175 LBS | SYSTOLIC BLOOD PRESSURE: 115 MMHG | HEART RATE: 62 BPM | HEIGHT: 68 IN | RESPIRATION RATE: 16 BRPM

## 2022-09-23 DIAGNOSIS — Z71.89 OTHER SPECIFIED COUNSELING: ICD-10-CM

## 2022-09-23 PROCEDURE — 94010 BREATHING CAPACITY TEST: CPT

## 2022-09-23 PROCEDURE — 99214 OFFICE O/P EST MOD 30 MIN: CPT | Mod: CS,25

## 2022-09-23 PROCEDURE — 94727 GAS DIL/WSHOT DETER LNG VOL: CPT

## 2022-09-23 PROCEDURE — 94729 DIFFUSING CAPACITY: CPT

## 2022-09-23 RX ORDER — TIOTROPIUM BROMIDE INHALATION SPRAY 3.12 UG/1
2.5 SPRAY, METERED RESPIRATORY (INHALATION)
Qty: 3 | Refills: 3 | Status: ACTIVE | OUTPATIENT
Start: 2020-12-22

## 2022-09-23 RX ORDER — FAMOTIDINE 40 MG/1
40 TABLET, FILM COATED ORAL
Qty: 90 | Refills: 3 | Status: ACTIVE | OUTPATIENT
Start: 2019-09-20

## 2022-09-23 RX ORDER — LEVALBUTEROL TARTRATE 45 UG/1
45 AEROSOL, METERED ORAL
Qty: 3 | Refills: 3 | Status: ACTIVE | OUTPATIENT
Start: 2019-09-20

## 2022-09-23 RX ORDER — BUDESONIDE AND FORMOTEROL FUMARATE DIHYDRATE 160; 4.5 UG/1; UG/1
160-4.5 AEROSOL RESPIRATORY (INHALATION) TWICE DAILY
Qty: 3 | Refills: 3 | Status: ACTIVE | OUTPATIENT
Start: 2019-09-20

## 2022-09-23 RX ORDER — CICLESONIDE 50 UG/1
50 SPRAY NASAL
Qty: 3 | Refills: 3 | Status: ACTIVE | OUTPATIENT
Start: 2019-09-20

## 2022-09-23 RX ORDER — MOMETASONE FUROATE 200 UG/1
200 AEROSOL RESPIRATORY (INHALATION)
Qty: 39 | Refills: 3 | Status: ACTIVE | OUTPATIENT
Start: 2021-03-29

## 2022-09-23 RX ORDER — IPRATROPIUM BROMIDE 42 UG/1
0.06 SPRAY NASAL 3 TIMES DAILY
Qty: 1 | Refills: 3 | Status: ACTIVE | OUTPATIENT
Start: 2022-09-23

## 2022-09-23 RX ORDER — DEXLANSOPRAZOLE 60 MG/1
60 CAPSULE, DELAYED RELEASE ORAL
Qty: 90 | Refills: 3 | Status: ACTIVE | OUTPATIENT
Start: 2019-09-20

## 2022-09-23 RX ORDER — LEVOCETIRIZINE DIHYDROCHLORIDE 5 MG/1
5 TABLET ORAL
Qty: 1 | Refills: 3 | Status: ACTIVE | OUTPATIENT
Start: 2019-09-20

## 2022-09-23 RX ORDER — MONTELUKAST 10 MG/1
10 TABLET, FILM COATED ORAL
Qty: 1 | Refills: 3 | Status: ACTIVE | OUTPATIENT
Start: 2019-09-20

## 2022-09-23 RX ORDER — GABAPENTIN 100 MG/1
100 CAPSULE ORAL 3 TIMES DAILY
Qty: 270 | Refills: 3 | Status: ACTIVE | OUTPATIENT
Start: 2022-05-25

## 2022-09-23 NOTE — ADDENDUM
[FreeTextEntry1] : Documented by Chemo Choe acting as a scribe for Dr. Justice Lama on (09/23/2022).\par \par All medical record entries made by the Scribe were at my, Dr. Justice Lama's, direction and personally dictated by me on (09/23/2022). I have reviewed the chart and agree that the record accurately reflects my personal performance of the history, physical exam, assessment and plan. I have personally directed, reviewed, and agree with the discharge instructions.

## 2022-09-23 NOTE — PROCEDURE
[FreeTextEntry1] : FENO was 12; a normal value being less than 25\par Fractional exhaled nitric oxide (FENO) is regarded as a simple, noninvasive method for assessing eosinophilic airway inflammation. Produced by a variety of cells within the lung, nitric oxide (NO) concentrations are generally low in healthy individuals. However, high concentrations of NO appear to be involved in nonspecific host defense mechanisms and chronic inflammatory diseases such as asthma. The American Thoracic Society (ATS) therefore has recommended using FENO to aid in the diagnosis and monitoring of eosinophilic airway inflammation and asthma, and for identifying steroid responsive individuals whose chronic respiratory symptoms may be caused by airway inflammation. \par \par PFT reveals normal flows, with an FEV1 of  3.88L, which is 113% of predicted, with a normal flow volume loop \par \par CT (09/01/2022) revealed Bronchomalacia of the right mainstem bronchus and bronchus intermedius.\par Multifocal bilateral patchy groundglass opacities, likely infectious/inflammatory.

## 2022-09-23 NOTE — ASSESSMENT
[FreeTextEntry1] : **********************ALL PRINTED SCRIPTS TO BE FILLED THROUGH [facility] FUND*************************\par \par Mr. Corrales is a 57 year old male who has a history of asthma, allergy, GERD, cervicale spine Dz (s/p surgery 12/19)  and abnormal CT. He is s/p [place] exposure. He now presents to the office today for a follow up pulmonary visit, now s/p complicated cervical spine surgery (12/2019) now s/p COVID 19 12/2020- s/p THR (right) 5/2021- now ?COVID-19 vaccine reaction 12/2021 - +TBM right Bronchomalacia \par \par problem 1: abnormal chest CT - c/w inflammation - ?COVID-19 residue (improved \par -PPD Quantiferon gold (-), ESR / Hypersensitivity panel (wnl)\par -Follow up Chest CT 3/2021 - 9/2021, 3/2022, next 3/2023\par \par problem 2: asthma (semi persistent) \par -continue Breztri 2 puffs BID = Symbicort/Spiriva/Asmanex \par -Continue Asmanex 200 2 inhalations BID\par -continue to use Symbicort 160 at 2 inhalations BID\par -continue Spiriva at 2 inhalation QD \par -continue to use Singulair 10 mg QHS \par -continue to use Xopenex PRN / Xopenex 0.63 via nebulizer q6H prn\par -Asthma is  believed to be caused by inherited (genetic) and environmental factor, but its exact cause is unknown. Asthma may be triggered by allergens, lung infections, or irritants in the air. Asthma triggers are different for each person\par -Inhaler technique reviewed as well as oral hygiene techniques reviewed with patient. Avoidance of cold air, extremes of temperature, rescue inhaler should be used before exercise. Order of medication reviewed with patient. Recommended use of a cool mist humidifier in the bedroom.\par \par problem 2A: Biologic evaluation\par - Script given for blood work: asthma profile, food IgE panel, eosinophil level, IgE level, Vitamin D level \par - Tezspire etc \par - The safety and efficacy of Nucala was established in three double-blind, randomized, placebo-controlled trials in patients with severe asthma. Compared to a placebo, patients with severe asthma receiving Nucala had fewer exacerbation requiring hospitalization and/or emergency department visits, and a longer time to first exacerbation. In addition, patients with severe asthma receiving Nucala or Fasenra experienced greater reductions in their daily maintenance oral corticosteroid dose, while maintaining asthma control compared with patients receiving placebo. Treatment with Nucala did not result in a significant improvement in lung function, as measured by the volume of air exhaled by patients in one second. The most common side effects include: headache, injection site reactions, back pain, weakness, and fatigue; hypersensitivity reactions can occur within hours or days including swelling of the face, mouth, and tongue, fainting, dizziness, hives, breathing problems, and rash; herpes zoster infections have occurred. The drug is a monoclonal antibody that inhibits interleukin-5 which helps regular eosinophils, a type of white blood cell that contributes to asthma. The over-production of eosinophils can cause inflammation in the lungs, increasing the frequency of asthma attacks. Patients must also take other medications, including high dose inhaled corticosteroids and at least one additional asthma drug. \par \par problem 3: cough; ? Sensory neuropathic cough (BM)\par -off Amitriptyline 10 mg up to TID, prn (DC if able)\par -Neurontin 100 Q8H \par -continue Promethazine DM prn\par Sensory neuropathic cough is an etiology of cough that is often realized once someone has been ruled out for common disease such as: asthma, COPD, eosinophilic bronchitis, bronchiectasis, post nasal drip, and GERD. It sometimes develops following a URI, herpes zoster outbreak in pharynx or thyroid or cervical spine injury. However, many patients have no identifiable antecedent explanation. \par \par Problem 3A: +TBM right Bronchomalacia \par -s/p Dynamic CT- CTS eval Dr Alvin Stubbs\par -Tracheomalacia is usually acquired in adults and common causes include damage by tracheostomy or endotracheal intubation damaging the tracheal cartilage with increase risk with multiple intubations, prolonged intubation, and concurrent high dose steroid therapy; external chest wall trauma and surgery; chronic compression of the trachea by benign etiologies (eg, benign mediastinal goiter) or malignancy; relapsing polychondritis; or recurrent infection. Tracheomalacia can be asymptomatic, however signs or symptoms can develop as the severity of the airway narrowing progresses with major symptoms include dyspnea, cough, and sputum retention. Other symptoms include severe paroxysms of coughing, wheezing or stridor, barking cough and may be exacerbated by forced expiration, cough, and valsalva maneuver. Tracheomalacia is diagnosed by a bronchoscopic visualization of dynamic airway collapse on dynamic chest CT. Therapy is warranted in symptomatic patients with severe tracheomalacia and includes surgical repair as tracheobronchoplasty. The patient was referred to Dr. Alvin Stubbs or Dr. Vinny Lilly, at [facility] for a surgical consult. \par \par problem 4: allergic rhinitis \par -recommended to use "Navage" nasal rinse device \par -continue Claritin 10 mg QAM \par -continue to use Xyzal 5 mg QHS\par -continue to use Omnaris 1 sniff/nostril BID\par -continue Olopatadine 1 sniff each nostril BID (.6)\par -follow up with Dr. Ok Islas  - s/p turbinectomy \par -Environmental measures for allergies were encouraged including mattress and pillow cover, air purifier, and environmental controls.\par \par problem 5: GERD (FeeSST- c/w LPR)\par -continue to use Dexilant 60 mg before first meal\par -continue to use Pepcid 40 mg QHS \par -Rule of 2s: avoid eating too much, eating too late, eating too spicy, eating two hours before bed\par -Things to avoid including overeating, spicy foods, tight clothing, eating within three hours of bed, this list is not all inclusive. \par -For treatment of reflux, possible options discussed including diet control, H2 blockers, PPIs, as well as coating motility agents discussed as treatment options. Timing of meals and proximity of last meal to sleep were discussed. If symptoms persist, a formal gastrointestinal evaluation is needed. \par \par problem 6: insomnia\par - medical marijuana\par -continue to use Ambien PRN \par -recommended good sleep hygiene\par -Good sleep hygiene was encouraged including avoiding watching television an hour before bed, keeping caffeine at a low,  avoiding reading, television, or anything, in bed, no drinking any liquids three hours before bedtime, and only getting into bed when tired and ready for sleep.\par \par problem 7: foot cramps (resolved)\par -continue to use MyoCalm PM\par \par Problem 8a: s/p COVID 19 infection 12/2020 (residual Sx) \par -Recommended not to get an additional COVID-19 booster at this time until it is updated against the current variants. If planning on travelling, obtaining another booster within 2 weeks of departure is recommended. \par -Covid 19 preclusions, vaccine and booster discussed at length and recommended \par - S/p Covid 19 vaccine (Moderna) x3 (12/2021)\par -s/p MAB infusion \par -educated patient on COVID 19 vaccine and appropriate recommendations \par -vaccine pending\par COVID-19 precautionary Immune Support Recommendations:\par -OTC Vitamin C 1000mg BID \par -OTC Quercetin 500mg BID \par -OTC Zinc 50 mg per day \par -OTC Melatonin 5 mg a night \par -OTC Vitamin D 2000mg per day \par -OTC Tonic Water 8oz per day\par -Water 0.5-1 gallon per day\par Additional Support Supplements: \par -Liposomal Glutathione 500 mg BID\par -SPM 1 tablet BID \par -Berberine 1000 mg BID  \par - mg BID \par \par problem 8: health maintenance\par -PPD negative as of 9/20/2021\par -received flu shot in - 9/2021\par -recommended strep pneumonia vaccines: Prevnar-13 vaccine, followed by Pneumo vaccine 23 on year following\par -recommended early intervention for URIs\par -recommended osteoporosis evaluations\par -recommended early dermatological evaluations\par -recommended after the age of 50 to the age of 70, colonoscopy every 5 years\par -encouraged early intervention\par \par \par F/U in 4 months\par He is encouraged to call with any changes, concerns, or questions. \par \par ***********************ALL PRINTED SCRIPTS TO BE FILLED THROUGH [facility]************************************

## 2022-09-23 NOTE — HISTORY OF PRESENT ILLNESS
[FreeTextEntry1] : Mr. Corrales is a 57 year old male with a history of allergic rhinitis, asthma, abnormal chest CT, and GERD presenting to the office today for a follow up visit. His chief complaint is\par - he notes feeling well in general\par - he noes right shoulder pain as he slept the wrong way \par - he  notes finishing PT \par - he notes walking better \par - he denies swallowing issues\par - he notes constant sour taste in mouth\par - he notes PND \par - he denies any visual issues \par - he notes weight is stable \par - he notes breathing could be better\par - he notes hoarseness \par - he notes reflux \par \par - He denies any headaches, nausea, vomiting, fever, chills, sweats, chest pain, chest pressure, palpitations, SOB, coughing, wheezing, fatigue, diarrhea, dysphagia, myalgias, dizziness, leg swelling, leg pain, itchy eyes, itchy ears, heartburn

## 2022-09-23 NOTE — PHYSICAL EXAM
[No Acute Distress] : no acute distress [Normal Oropharynx] : normal oropharynx [Normal Appearance] : normal appearance [No Neck Mass] : no neck mass [Normal Rate/Rhythm] : normal rate/rhythm [Normal S1, S2] : normal s1, s2 [No Murmurs] : no murmurs [No Resp Distress] : no resp distress [Clear to Auscultation Bilaterally] : clear to auscultation bilaterally [No Abnormalities] : no abnormalities [Benign] : benign [Normal Gait] : normal gait [No Clubbing] : no clubbing [No Cyanosis] : no cyanosis [No Edema] : no edema [FROM] : FROM [Normal Color/ Pigmentation] : normal color/ pigmentation [No Focal Deficits] : no focal deficits [Oriented x3] : oriented x3 [Normal Affect] : normal affect [II] : Mallampati Class: II [TextBox_68] : I:E 1:3; very mild expiratory wheezes [TextBox_105] : 1+ LE edema

## 2022-09-23 NOTE — REASON FOR VISIT
[Follow-Up] : a follow-up visit [FreeTextEntry1] : allergic rhinitis, asthma, abnormal chest CT, bronchomalacia, and GERD

## 2022-11-08 ENCOUNTER — NON-APPOINTMENT (OUTPATIENT)
Age: 57
End: 2022-11-08

## 2022-12-02 ENCOUNTER — APPOINTMENT (OUTPATIENT)
Dept: GASTROENTEROLOGY | Facility: CLINIC | Age: 57
End: 2022-12-02

## 2022-12-02 VITALS
HEIGHT: 68 IN | DIASTOLIC BLOOD PRESSURE: 79 MMHG | HEART RATE: 83 BPM | OXYGEN SATURATION: 99 % | SYSTOLIC BLOOD PRESSURE: 118 MMHG | BODY MASS INDEX: 28.73 KG/M2 | WEIGHT: 189.6 LBS | TEMPERATURE: 99.3 F

## 2022-12-02 DIAGNOSIS — K59.00 CONSTIPATION, UNSPECIFIED: ICD-10-CM

## 2022-12-02 PROCEDURE — 99214 OFFICE O/P EST MOD 30 MIN: CPT

## 2022-12-02 RX ORDER — SODIUM SULFATE, POTASSIUM SULFATE, MAGNESIUM SULFATE 17.5; 3.13; 1.6 G/ML; G/ML; G/ML
17.5-3.13-1.6 SOLUTION, CONCENTRATE ORAL
Qty: 1 | Refills: 0 | Status: DISCONTINUED | COMMUNITY
Start: 2021-01-11 | End: 2022-12-02

## 2022-12-02 NOTE — PHYSICAL EXAM
[General Appearance - Alert] : alert [General Appearance - In No Acute Distress] : in no acute distress [Neck Appearance] : the appearance of the neck was normal [Neck Cervical Mass (___cm)] : no neck mass was observed [Jugular Venous Distention Increased] : there was no jugular-venous distention [Thyroid Diffuse Enlargement] : the thyroid was not enlarged [Thyroid Nodule] : there were no palpable thyroid nodules [Auscultation Breath Sounds / Voice Sounds] : lungs were clear to auscultation bilaterally [Heart Rate And Rhythm] : heart rate was normal and rhythm regular [Heart Sounds] : normal S1 and S2 [Heart Sounds Gallop] : no gallops [Murmurs] : no murmurs [Heart Sounds Pericardial Friction Rub] : no pericardial rub [Edema] : there was no peripheral edema [Bowel Sounds] : normal bowel sounds [Abdomen Soft] : soft [Abdomen Tenderness] : non-tender [] : no hepato-splenomegaly [Abdomen Mass (___ Cm)] : no abdominal mass palpated [No CVA Tenderness] : no ~M costovertebral angle tenderness [Oriented To Time, Place, And Person] : oriented to person, place, and time [Impaired Insight] : insight and judgment were intact [Affect] : the affect was normal

## 2022-12-04 NOTE — CONSULT LETTER
[FreeTextEntry1] : Dear Dr. Christian Joyner and Dr. Justice Lama and Dr. Jeffrey Chirinos,\par \par I had the pleasure of seeing your patient ABHIJIT PETERS in the office today.  My office note is attached. PLEASE READ THE "ASSESSMENT" SECTION OF THE NOTE TO SEE MY IMPRESSION AND PLAN.\par \par Thank you very much for allowing me to participate in the care of your patient.\par \par Sincerely,\par \par Bernardo Ramos M.D., FACG, FACP\par Director, Celiac Program at [facility]\par  of Medicine, [employer] at [facility] Adjunct  of Medicine, [facility] Practice Director, [facility] Gastroenterology at [place]\[facility] [address]\[place] [address]\par Tel: [phone]\par Email: [email or web address]\par \par \par The attached note has been created using a voice recognition system (Dragon).  There may be some misspellings and typos.  Please call my office if you have any issues or questions.

## 2022-12-04 NOTE — REASON FOR VISIT
[FreeTextEntry1] : Iron deficiency anemia, hiatal hernia, intestinal metaplasia at the GE junction, history of adenomatous colonic polyps, constipation

## 2022-12-04 NOTE — HISTORY OF PRESENT ILLNESS
[FreeTextEntry1] : The patient has a history of a 2 cm hiatal hernia with intestinal metaplasia at the GE junction and a history of adenomatous colonic polyps.  He is on Dexilant in the morning and famotidine at bedtime.  He has been diagnosed with bronchomalacia and is short of breath.  He is being evaluated for surgery.  He reports that he has a diagnosis of iron deficiency anemia dating back over 6 years.  The patient has a history of gastric bypass.  He received 2 iron infusions in the past month.  He gets occasional heartburn especially if he eats late.  He denies dysphagia or abdominal pain.  He generally has a bowel movement every 2 to 3 days although gets explosive bowel movements 1-2 times a month.  He denies melena or bright red blood per rectum.  His last EGD and colonoscopy were in February 2021.\par \par \par Note from 3/23/2021 - We reviewed the patient's EGD and colonoscopy performed on February 5, 2021.  EGD was significant for a 2 cm hiatal hernia.  The patient is status post gastric bypass.  Biopsies revealed intestinal metaplasia at the GE junction but were otherwise negative.  Colonoscopy was significant for removal of and appendiceal orifice polyp with negative biopsies.  The patient has mild to moderate diverticulosis involving the hepatic flexure, transverse colon, descending colon, and sigmoid.  Random right and left colonic biopsies were negative the patient also has internal and external hemorrhoids.  He is on Dexilant in the morning and famotidine at bedtime.  He states that 1-2 times a week, after his evening pills, he will get heartburn relieved with Tums.  He notes that his stools are improved with much less in the way of loose stools.  He is taking tramadol which constipates him.  He generally has 1 bowel movement a day and denies melena or bright red blood per rectum.  He has a history of adenomatous colonic polyps.\par \par \par Note from 1/11/2021 - The patient has a history of adenomatous colonic polyps with a large polyp removed in a piecemeal fashion from the hepatic flexure on his last colonoscopy.  He also has a history of reflux esophagitis and gastric bypass.  He is currently recovering from COVID-19.  He was diagnosed on December 22 and had shortness of breath.  He was not hospitalized but was treated with IV antibodies, dexamethasone, and Zithromax for 5 days.  He denies abdominal pain.  He gets heartburn 2-3 times a week despite taking Dexilant in the morning and famotidine at bedtime.  He moves his bowels about once a day but states that 50% of his stools are loose with urgency while the other 50% of solid.  He denies melena or bright red blood per rectum.  The patient has not been admitted to the hospital in the past year and denies any cardiac issues.\par \par \par Note from 6/10/2020 - We reviewed the evaluations done since the patient's last visit on February 12, 2020.  Blood work and stool testing was normal.  The patient underwent EGD and colonoscopy on March 10, 2020.  EGD revealed an irregular Z line with biopsy showing evidence of reflux esophagitis.  The patient is status post gastric bypass.  All the biopsies were negative.  Colonoscopy was significant for removal of 2 tubular adenomas including one that was large and flat at the hepatic flexure and required piecemeal polypectomy.  The patient also has diverticulosis in the transverse, descending, and sigmoid colon as well as internal and external hemorrhoids which are asymptomatic.  The patient was taking Dexilant in the evening and famotidine in the morning.  He notes heartburn/sour taste in the early morning and sometimes at night.  He works varying schedules and sometimes works nights .\par \par \par Note from 2/12/2020 - The patient is a 54-year-old man with a history of [place] [employer] exposure and a history of a gastric bypass as well as a lap band who underwent laminectomy and lumbar fusion in July 2019 complicated by postoperative infection and then underwent cervical disc replacement and fusion in December.  Following his surgeries in July the patient noticed a change in his bowel habits.  He is now having 2-3 bowel movements at a time approximately 3-4 times a week which is bloody/soft in consistency.  He denies melena or bright red blood per rectum.  Patient is on Dexilant for chronic reflux.  He denies heartburn, dysphasia, or abdominal pain.  He has gained a little weight.  The patient was hospitalized last year for the above surgeries and the postoperative infection.  He denies any cardiac history.

## 2022-12-04 NOTE — ASSESSMENT
[FreeTextEntry1] : Patient with a 2 cm hiatal hernia, intestinal metaplasia at the GE junction, and a history of adenomatous colonic polyps.  He has iron deficiency anemia and is recently received iron infusions.  He has been diagnosed with bronchomalacia and is actively short of breath and is being considered for surgery.  He has a history of gastric bypass.  He is on Dexilant 60 mg in the morning and famotidine 40 mg at bedtime.  He has constipation.\par \par As the patient had EGD and colonoscopy in February 2021, especially in light of the patient's active respiratory issues, I will begin my evaluation of the iron deficiency anemia in the noninvasive manner.  The patient was sent for a CT enterography, which will be able to visualize the bypassed segment of the GI tract as well as serve as a noninvasive means of evaluating the rest of the GI tract.\par \par The patient was advised to eat start taking MiraLAX 17 g in 8 ounces of water daily.  He will call me if his bowel movements do not improve.\par \par Patient will continue Dexilant and famotidine.\par \par Patient is due for EGD and colonoscopy in February 2024.\par \par \par Plan from 3/23/2021 - Patient with reflux with a 2 cm hiatal hernia and intestinal metaplasia at the GE junction found on endoscopy.  Colonoscopy was significant for diverticulosis.  He has a history of adenomatous colonic polyps.  He is on Dexilant in the morning and famotidine at bedtime.\par \par Patient will continue his current medications.\par \par Patient was counseled regarding the importance of a high-fiber diet.\par \par We will repeat EGD and colonoscopy in 3 years that is February 2024.\par \par Patient will return to see me in 1 year or sooner if needed.\par \par \par Plan from 1/11/2021 - Patient with a history of adenomatous colonic polyps including a large one removed in a piecemeal fashion from the hepatic flexure, reflux esophagitis with ongoing symptoms despite taking Dexilant and famotidine, and frequent loose stools with urgency.  The patient is currently recovering from COVID-19 infection.\par \par We will plan on performing EGD and colonoscopy at the end of March.  An EGD and colonoscopy will be scheduled. The risks, benefits, alternatives, and limitations of the procedures, including the possibility of missed lesions, were explained.  The patient will check with his pulmonary doctor to see if there are any concerns regarding ongoing lung problems from COVID-19.\par \par \par Plan from 6/10/2020 - Patient with evidence of reflux esophagitis on endoscopy.  He had tubular adenomas including a large one that was removed in piecemeal fashion and also has diverticulosis.\par \par I advised the patient to change the medication to Dexilant in the morning before breakfast and famotidine at bedtime.\par \par A list of dietary and lifestyle modifications in the treatment of GERD was given to the patient.\par \par Information was given to the patient regarding diverticulosis and a high-fiber diet.\par \par Given the piecemeal polypectomy, we will repeat a colonoscopy in March 2021.\par \par \par Plan from 2/12/2020 - Patient with a change in bowel habits with bloody/soft stools dating back to her surgeries last year.  He also has chronic reflux and is doing well on Dexilant.  He has a history of [place] [employer] exposure.\par \par An EGD and colonoscopy have been scheduled. The risks, benefits, alternatives, and limitations of the procedures, including the possibility of missed lesions, were explained.  The patient will require anesthesia clearance at [facility] prior to the procedure.\par \par Bloodwork was sent for CBC, chem-pack, TSH, celiac markers.\par \par Stool studies will be sent for a GI infectious PCR panel and C. diff by PCR.

## 2022-12-08 ENCOUNTER — APPOINTMENT (OUTPATIENT)
Dept: PULMONOLOGY | Facility: CLINIC | Age: 57
End: 2022-12-08
Payer: COMMERCIAL

## 2022-12-08 VITALS
TEMPERATURE: 97.6 F | SYSTOLIC BLOOD PRESSURE: 114 MMHG | BODY MASS INDEX: 28.79 KG/M2 | WEIGHT: 190 LBS | DIASTOLIC BLOOD PRESSURE: 68 MMHG | RESPIRATION RATE: 16 BRPM | OXYGEN SATURATION: 94 % | HEART RATE: 96 BPM | HEIGHT: 68 IN

## 2022-12-08 PROCEDURE — 99214 OFFICE O/P EST MOD 30 MIN: CPT

## 2022-12-08 RX ORDER — BUDESONIDE 0.5 MG/2ML
0.5 INHALANT ORAL
Qty: 60 | Refills: 5 | Status: ACTIVE | COMMUNITY
Start: 2020-01-23 | End: 1900-01-01

## 2022-12-14 ENCOUNTER — APPOINTMENT (OUTPATIENT)
Dept: CT IMAGING | Facility: CLINIC | Age: 57
End: 2022-12-14

## 2022-12-14 ENCOUNTER — OUTPATIENT (OUTPATIENT)
Dept: OUTPATIENT SERVICES | Facility: HOSPITAL | Age: 57
LOS: 1 days | End: 2022-12-14
Payer: COMMERCIAL

## 2022-12-14 ENCOUNTER — APPOINTMENT (OUTPATIENT)
Dept: RADIOLOGY | Facility: CLINIC | Age: 57
End: 2022-12-14

## 2022-12-14 DIAGNOSIS — Z98.84 BARIATRIC SURGERY STATUS: Chronic | ICD-10-CM

## 2022-12-14 DIAGNOSIS — Z90.49 ACQUIRED ABSENCE OF OTHER SPECIFIED PARTS OF DIGESTIVE TRACT: Chronic | ICD-10-CM

## 2022-12-14 DIAGNOSIS — Z98.890 OTHER SPECIFIED POSTPROCEDURAL STATES: Chronic | ICD-10-CM

## 2022-12-14 DIAGNOSIS — Z00.00 ENCOUNTER FOR GENERAL ADULT MEDICAL EXAMINATION WITHOUT ABNORMAL FINDINGS: ICD-10-CM

## 2022-12-14 PROCEDURE — 71046 X-RAY EXAM CHEST 2 VIEWS: CPT

## 2022-12-14 PROCEDURE — 71046 X-RAY EXAM CHEST 2 VIEWS: CPT | Mod: 26

## 2022-12-14 PROCEDURE — 74177 CT ABD & PELVIS W/CONTRAST: CPT | Mod: 26

## 2022-12-14 PROCEDURE — 74177 CT ABD & PELVIS W/CONTRAST: CPT

## 2022-12-16 ENCOUNTER — NON-APPOINTMENT (OUTPATIENT)
Age: 57
End: 2022-12-16

## 2022-12-16 ENCOUNTER — TRANSCRIPTION ENCOUNTER (OUTPATIENT)
Age: 57
End: 2022-12-16

## 2022-12-17 ENCOUNTER — NON-APPOINTMENT (OUTPATIENT)
Age: 57
End: 2022-12-17

## 2022-12-22 ENCOUNTER — APPOINTMENT (OUTPATIENT)
Dept: THORACIC SURGERY | Facility: CLINIC | Age: 57
End: 2022-12-22

## 2022-12-22 ENCOUNTER — NON-APPOINTMENT (OUTPATIENT)
Age: 57
End: 2022-12-22

## 2022-12-22 VITALS
SYSTOLIC BLOOD PRESSURE: 126 MMHG | DIASTOLIC BLOOD PRESSURE: 81 MMHG | BODY MASS INDEX: 28.34 KG/M2 | HEIGHT: 68 IN | OXYGEN SATURATION: 100 % | WEIGHT: 187 LBS | HEART RATE: 83 BPM

## 2022-12-22 DIAGNOSIS — Z77.098 CONTACT WITH AND (SUSPECTED) EXPOSURE TO OTHER HAZARDOUS, CHIEFLY NONMEDICINAL, CHEMICALS: ICD-10-CM

## 2022-12-22 PROCEDURE — 99205 OFFICE O/P NEW HI 60 MIN: CPT

## 2022-12-22 NOTE — REVIEW OF SYSTEMS
Discharge Instructions - Orthopedics  Lakeshia Car 79 y o  female MRN: [id number]  Weight Bearing Status:                                           Nonweightbearing in the Right lower extremity with the hinged knee brace    Appt Instructions: If you do not have your appointment, please call the clinic at [phone] to f/u with Dr Wily Thompson in 2 weeks for repeat imaging  Otherwise followup as scheduled       Neurosurgery discharge instructions following traumatic head bleed:      Do not take any blood thinning medications (ie  No Advil  No motrin  No ibuprofen  No Aleve  No Aspirin  No fishoil  No heparin  No antiplatelet / no anticoagulation medication)   Refrain from activity that increases chance of trauma to head or falls  Recommend you take fall precaution   No strenuous activity or sports   Return to hospital Emergency Room if you experience worsening / new headache, nausea/vomiting, speech/vision change, seizure, confusion / mental status change, weakness, or other neurological changes  Follow-up as scheduled with a repeat CT head without contrast to be completed 2-3 days prior to visit  Prescription has been entered electronically  Please call  to schedule  Trauma Service Discharge Instructions / Traumatic Laceration and Wound Care Instructions:     Wound Care:  - Wash laceration/wound daily, gently in the shower, do not scrub  Pat dry with clean towel  Do NOT immerse completely in water (i e  tub or swimming pool) until cleared by trauma  - Follow-up with the Trauma Service as directed for suture/staple removal   - Call office if you develop fever/chills, redness/swelling/drainage from the site      Additional Instructions:  - If you have any questions or concerns after discharge please call the office   - Call office or return to ER if fever greater than 101, chills, increasing redness/swelling at site of laceration/wound, purulent or foul smelling drainage from laceration/wound, and/or worsening/uncontrollable pain  - During your hospitalization, you were not receiving tramadol or OxyContin (prior home medications for pain) with adequate pain control using as needed Oxycodone in addition to scheduled Robaxin and gabapentin  On discharge, you will be prescribed your current pain medication regimen including as needed Oxycodone IR, scheduled Robaxin, and Neurontin  - You will need review of your medication regimen once weaned off of the oxycodone IR for acute pain to determine if you will require resumption of your home medication regimen  Ultimately, tramadol strongly recommended against due to the associated negative affects, specifically delirium, and this patient's age range  [As Noted in HPI] : as noted in HPI [Negative] : Heme/Lymph

## 2022-12-23 NOTE — ASSESSMENT
[FreeTextEntry1] : Mr. 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 Justice Lama. \par \par CT Chest on 9/7/22:\par - excessive collapse of the right mainstem bronchus and bronchus intermedius on expiration, consistent with bronchomalacia. No tracheomalacia\par - Multifocal patchy groundglass opacities are noted in bilateral upper and lower lobes, new from 2018 and likely infectious/inflammatory in etiology\par - Calcified granulomas are present\par - No thoracic adenopathy\par - Small hiatal hernia\par - s/p gastric bypass surgery with a gastric lap band present\par \par I have reviewed the patient's medical records and diagnostic images at time of this office consultation and have made the following recommendation:\par 1. CT scan reviewed, I recommended an Awake Bronch on 1/12/2023. Risks and benefits and alternatives explained to patient, all questions answered, patient agreed to proceed with procedure.\par 2. Medical clearance, PST and COVID testing.\par \par \par I, Dr. SAMAYOA, ANGELIA EMERSON, personally performed the evaluation and management (E/M) services for this established patient who presents today with (a) new problem(s)/exacerbation of (an) existing condition(s). That E/M includes conducting the examination, assessing all new/exacerbated conditions, and establishing a new plan of care. Today, my ACP, Saba Root NP was here to observe my evaluation and management services for this new problem/exacerbated condition to be followed going forward.\par \par

## 2022-12-23 NOTE — HISTORY OF PRESENT ILLNESS
[FreeTextEntry1] : Mr. 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 Justice Lama. \par \par PFTs on 1/24/22:  %, FEV1 126%, DLCO 130%.\par \par CT Chest on 9/7/22:\par - excessive collapse of the right mainstem bronchus and bronchus intermedius on expiration, consistent with bronchomalacia. No tracheomalacia\par - Multifocal patchy groundglass opacities are noted in bilateral upper and lower lobes, new from 2018 and likely infectious/inflammatory in etiology\par - Calcified granulomas are present\par - No thoracic adenopathy\par - Small hiatal hernia\par - s/p gastric bypass surgery with a gastric lap band present\par \par Spirometry on 9/23/22: FVC 98%, FEV1 113%.\par \par Patient is here today for CT Sx consultation, referred by Dr. Lama. Admits to SOB, hoarseness, dry cough for 5 years, worsened over the past year, just completed a 3 weeks course of steroid by Dr. Justice Lama.\par

## 2022-12-23 NOTE — CONSULT LETTER
[Consult Letter:] : I had the pleasure of evaluating your patient, [unfilled]. [( Thank you for referring [unfilled] for consultation for _____ )] : Thank you for referring [unfilled] for consultation for [unfilled] [Please see my note below.] : Please see my note below. [Consult Closing:] : Thank you very much for allowing me to participate in the care of this patient.  If you have any questions, please do not hesitate to contact me. [Sincerely,] : Sincerely, [FreeTextEntry2] : Dr. Justice Lama (Pulm/Referring) [FreeTextEntry3] : Alvin Stubbs MD, MPH \par System Director of Thoracic Surgery \par Director of Comprehensive Lung and Foregut [place] \par Professor Cardiovascular & Thoracic Surgery  \par [facility]\par \par [facility]\par [address]\par Oncology [address]\[place] [address]\par Tel: [phone]\par Fax: [phone]\par

## 2022-12-27 NOTE — ASSESSMENT
[FreeTextEntry1] : Mr. Corrales is a 57 year old male with a history of allergic rhinitis, asthma, abnormal chest CT, and GERD who now presents to the office for sick visit. \par \par His chief complaint is cough.\par \par 1. Cough\par -r/t TBM \par -add Prednisone 30 mg X 5 days, then 20 mg X 5 days, then 10 mg X 5 days.\par -add Promethazine cough syrup PO 5 ml Q6H\par -add CXR (list of [facility] imaging centers given at checkout)\par -advised follow up with CTS\par \par 2. Asthma\par -restart Xopenex 0.63 via nebulizer q6H \par -Restart budesonide via nebulizer twice daily\par -Continue Breztri 2 puffs twice daily\par \par Patient to follow up with Dr. Lama as scheduled.\par Patient to call with further questions and concerns.\par Patient verbalizes understanding of care plan and is agreeable.\par \par

## 2022-12-27 NOTE — REVIEW OF SYSTEMS
16-Nov-2020 09:57 [Cough] : cough [Hemoptysis] : no hemoptysis [Chest Tightness] : no chest tightness [Frequent URIs] : no frequent URIs [Sputum] : no sputum [Dyspnea] : no dyspnea [Pleuritic Pain] : no pleuritic pain [Wheezing] : no wheezing [A.M. Dry Mouth] : no a.m. dry mouth [SOB on Exertion] : no sob on exertion [Negative] : Psychiatric

## 2023-01-03 ENCOUNTER — OUTPATIENT (OUTPATIENT)
Dept: OUTPATIENT SERVICES | Facility: HOSPITAL | Age: 58
LOS: 1 days | End: 2023-01-03
Payer: COMMERCIAL

## 2023-01-03 VITALS
SYSTOLIC BLOOD PRESSURE: 109 MMHG | TEMPERATURE: 99 F | DIASTOLIC BLOOD PRESSURE: 78 MMHG | HEART RATE: 66 BPM | OXYGEN SATURATION: 99 % | RESPIRATION RATE: 15 BRPM | WEIGHT: 182.1 LBS | HEIGHT: 67 IN

## 2023-01-03 DIAGNOSIS — J45.909 UNSPECIFIED ASTHMA, UNCOMPLICATED: ICD-10-CM

## 2023-01-03 DIAGNOSIS — R09.89 OTHER SPECIFIED SYMPTOMS AND SIGNS INVOLVING THE CIRCULATORY AND RESPIRATORY SYSTEMS: ICD-10-CM

## 2023-01-03 DIAGNOSIS — Z98.890 OTHER SPECIFIED POSTPROCEDURAL STATES: Chronic | ICD-10-CM

## 2023-01-03 DIAGNOSIS — Z90.49 ACQUIRED ABSENCE OF OTHER SPECIFIED PARTS OF DIGESTIVE TRACT: Chronic | ICD-10-CM

## 2023-01-03 DIAGNOSIS — I10 ESSENTIAL (PRIMARY) HYPERTENSION: ICD-10-CM

## 2023-01-03 DIAGNOSIS — Z98.1 ARTHRODESIS STATUS: Chronic | ICD-10-CM

## 2023-01-03 DIAGNOSIS — F41.9 ANXIETY DISORDER, UNSPECIFIED: ICD-10-CM

## 2023-01-03 DIAGNOSIS — J39.8 OTHER SPECIFIED DISEASES OF UPPER RESPIRATORY TRACT: ICD-10-CM

## 2023-01-03 DIAGNOSIS — Z98.84 BARIATRIC SURGERY STATUS: Chronic | ICD-10-CM

## 2023-01-03 LAB
ANION GAP SERPL CALC-SCNC: 8 MMOL/L — SIGNIFICANT CHANGE UP (ref 7–14)
BUN SERPL-MCNC: 22 MG/DL — SIGNIFICANT CHANGE UP (ref 7–23)
CALCIUM SERPL-MCNC: 9.3 MG/DL — SIGNIFICANT CHANGE UP (ref 8.4–10.5)
CHLORIDE SERPL-SCNC: 104 MMOL/L — SIGNIFICANT CHANGE UP (ref 98–107)
CO2 SERPL-SCNC: 27 MMOL/L — SIGNIFICANT CHANGE UP (ref 22–31)
CREAT SERPL-MCNC: 1.22 MG/DL — SIGNIFICANT CHANGE UP (ref 0.5–1.3)
EGFR: 69 ML/MIN/1.73M2 — SIGNIFICANT CHANGE UP
GLUCOSE SERPL-MCNC: 91 MG/DL — SIGNIFICANT CHANGE UP (ref 70–99)
HCT VFR BLD CALC: 43.3 % — SIGNIFICANT CHANGE UP (ref 39–50)
HGB BLD-MCNC: 13.7 G/DL — SIGNIFICANT CHANGE UP (ref 13–17)
MCHC RBC-ENTMCNC: 25.6 PG — LOW (ref 27–34)
MCHC RBC-ENTMCNC: 31.6 GM/DL — LOW (ref 32–36)
MCV RBC AUTO: 80.8 FL — SIGNIFICANT CHANGE UP (ref 80–100)
NRBC # BLD: 0 /100 WBCS — SIGNIFICANT CHANGE UP (ref 0–0)
NRBC # FLD: 0 K/UL — SIGNIFICANT CHANGE UP (ref 0–0)
PLATELET # BLD AUTO: 174 K/UL — SIGNIFICANT CHANGE UP (ref 150–400)
POTASSIUM SERPL-MCNC: 4.2 MMOL/L — SIGNIFICANT CHANGE UP (ref 3.5–5.3)
POTASSIUM SERPL-SCNC: 4.2 MMOL/L — SIGNIFICANT CHANGE UP (ref 3.5–5.3)
RBC # BLD: 5.36 M/UL — SIGNIFICANT CHANGE UP (ref 4.2–5.8)
RBC # FLD: 23.2 % — HIGH (ref 10.3–14.5)
SODIUM SERPL-SCNC: 139 MMOL/L — SIGNIFICANT CHANGE UP (ref 135–145)
WBC # BLD: 4.1 K/UL — SIGNIFICANT CHANGE UP (ref 3.8–10.5)
WBC # FLD AUTO: 4.1 K/UL — SIGNIFICANT CHANGE UP (ref 3.8–10.5)

## 2023-01-03 PROCEDURE — 93010 ELECTROCARDIOGRAM REPORT: CPT

## 2023-01-03 RX ORDER — LORATADINE 10 MG/1
1 TABLET ORAL
Qty: 0 | Refills: 0 | DISCHARGE

## 2023-01-03 RX ORDER — RAMELTEON 8 MG
1 TABLET ORAL
Qty: 0 | Refills: 0 | DISCHARGE

## 2023-01-03 RX ORDER — IPRATROPIUM BROMIDE 0.2 MG/ML
1 SOLUTION, NON-ORAL INHALATION
Qty: 0 | Refills: 0 | DISCHARGE

## 2023-01-03 RX ORDER — FOLIC ACID 0.8 MG
1 TABLET ORAL
Qty: 0 | Refills: 0 | DISCHARGE

## 2023-01-03 RX ORDER — FAMOTIDINE 10 MG/ML
1 INJECTION INTRAVENOUS
Qty: 0 | Refills: 0 | DISCHARGE

## 2023-01-03 RX ORDER — SODIUM CHLORIDE 9 MG/ML
1000 INJECTION, SOLUTION INTRAVENOUS
Refills: 0 | Status: DISCONTINUED | OUTPATIENT
Start: 2023-01-12 | End: 2023-01-26

## 2023-01-03 RX ORDER — LEVOCETIRIZINE DIHYDROCHLORIDE 0.5 MG/ML
1 SOLUTION ORAL
Qty: 0 | Refills: 0 | DISCHARGE

## 2023-01-03 RX ORDER — VERAPAMIL HCL 240 MG
1 CAPSULE, EXTENDED RELEASE PELLETS 24 HR ORAL
Qty: 0 | Refills: 0 | DISCHARGE

## 2023-01-03 RX ORDER — LEVALBUTEROL 1.25 MG/.5ML
2 SOLUTION, CONCENTRATE RESPIRATORY (INHALATION)
Qty: 0 | Refills: 0 | DISCHARGE

## 2023-01-03 RX ORDER — TIOTROPIUM BROMIDE 18 UG/1
1 CAPSULE ORAL; RESPIRATORY (INHALATION)
Qty: 0 | Refills: 0 | DISCHARGE

## 2023-01-03 RX ORDER — FERROUS SULFATE 325(65) MG
3 TABLET ORAL
Qty: 0 | Refills: 0 | DISCHARGE

## 2023-01-03 RX ORDER — DEXLANSOPRAZOLE 30 MG/1
1 CAPSULE, DELAYED RELEASE ORAL
Qty: 0 | Refills: 0 | DISCHARGE

## 2023-01-03 RX ORDER — TELMISARTAN 20 MG/1
1 TABLET ORAL
Qty: 0 | Refills: 0 | DISCHARGE

## 2023-01-03 RX ORDER — BUDESONIDE AND FORMOTEROL FUMARATE DIHYDRATE 160; 4.5 UG/1; UG/1
2 AEROSOL RESPIRATORY (INHALATION)
Qty: 0 | Refills: 0 | DISCHARGE

## 2023-01-03 NOTE — H&P PST ADULT - HISTORY OF PRESENT ILLNESS
57 year old male PMH of HTN, GERD, obesity s/p gastric by pass 2002, lap banding (2008) PTSD, Asthma, lung nodules, insomnia, spondylolisthesis lumbar region, S/P Lumbar 3 laminectomy , presents to PST, with pre op diagnosis of Tracheobronchomalacia for pre op evaluation prior to scheduled surgery- Awake Bronchoscopy with Dr Stubbs.

## 2023-01-03 NOTE — H&P PST ADULT - PROBLEM SELECTOR PLAN 1
Patient is tentatively scheduled for awake bronchoscopy with Dr Stubbs on 01/12/2023.    Pre-op instructions provided. Pt given verbal and written instructions with teach back on patients own dexilant. Pt verbalized understanding with return demonstration.    Routine Covid PCR test ordered .Instructions regarding covid PCR test and locations for covid testing site provided. Pt verbalized understanding  CBC, BMP sent

## 2023-01-03 NOTE — H&P PST ADULT - NSICDXPASTMEDICALHX_GEN_ALL_CORE_FT
PAST MEDICAL HISTORY:  Anxiety     Asthma no previous intubation for asthma, reactive airway    Disc displacement, lumbar     Gastroesophageal reflux disease with hiatal hernia     H/O neuropathy upper and lower extremities    History of obesity s/p gastric banding 2007 (Pt refuses to deflate lap band for surgery 12/11/19    HTN (hypertension)     Insomnia     Lung nodule bilateral    Other spondylosis with radiculopathy, cervical region     PTSD (post-traumatic stress disorder) [employer] survivor

## 2023-01-03 NOTE — H&P PST ADULT - PROBLEM SELECTOR PLAN 2
Patient instructed to take telmisartan with a sip of water on the morning of procedure.  EKG in chart  Will obtain ECHO and stress results from cardiologist

## 2023-01-03 NOTE — H&P PST ADULT - PROBLEM SELECTOR PLAN 5
complete visualization of uvula- class 2- Mallampati  denies loose teeth or dentures    DORA precautions

## 2023-01-03 NOTE — H&P PST ADULT - NSICDXPASTSURGICALHX_GEN_ALL_CORE_FT
PAST SURGICAL HISTORY:  Gastric banding status 2008    H/O carpal tunnel repair bilateral w/ ulnar nerve intratment    H/O laminectomy 7/2019 and fusion, ID lumbar 8/2019    History of cholecystectomy     History of colonoscopy 5 years ago    History of gastric surgery 1/2002    S/P cervical spinal fusion     S/P LASIK surgery

## 2023-01-03 NOTE — H&P PST ADULT - RESPIRATORY AND THORAX COMMENTS
patient reports of chronic asthma since 09/2011, controlled on inhalers, complaints of dry cough and occasional SOB for last  >6 months follows up with  Pullmonologist

## 2023-01-03 NOTE — H&P PST ADULT - MUSCULOSKELETAL
ROM intact/no calf tenderness/strength 5/5 bilateral upper extremities/strength 5/5 bilateral lower extremities details…

## 2023-01-03 NOTE — H&P PST ADULT - CARDIOVASCULAR COMMENTS
METS- patient reports he can walk 1-2 blocks, slowly and climb 1-2 flights slowly with no chest pain or dyspnea on exertion 4

## 2023-01-04 PROBLEM — F41.9 ANXIETY DISORDER, UNSPECIFIED: Chronic | Status: ACTIVE | Noted: 2023-01-03

## 2023-01-04 PROBLEM — K21.9 GASTRO-ESOPHAGEAL REFLUX DISEASE WITHOUT ESOPHAGITIS: Chronic | Status: ACTIVE | Noted: 2023-01-03

## 2023-01-09 ENCOUNTER — LABORATORY RESULT (OUTPATIENT)
Age: 58
End: 2023-01-09

## 2023-01-11 NOTE — ASU PATIENT PROFILE, ADULT - FALL HARM RISK - UNIVERSAL INTERVENTIONS
Bed in lowest position, wheels locked, appropriate side rails in place/Call bell, personal items and telephone in reach/Instruct patient to call for assistance before getting out of bed or chair/Non-slip footwear when patient is out of bed/Model to call system/Physically safe environment - no spills, clutter or unnecessary equipment/Purposeful Proactive Rounding/Room/bathroom lighting operational, light cord in reach

## 2023-01-11 NOTE — ASU PATIENT PROFILE, ADULT - NSICDXPASTMEDICALHX_GEN_ALL_CORE_FT
PAST MEDICAL HISTORY:  Anxiety     Asthma no previous intubation for asthma, reactive airway    Disc displacement, lumbar     Gastroesophageal reflux disease with hiatal hernia     History of obesity s/p gastric banding 2007 (Pt refuses to deflate lap band for surgery 12/11/19    HTN (hypertension)     Insomnia     Lung nodule bilateral    Other spondylosis with radiculopathy, cervical region     PTSD (post-traumatic stress disorder) [employer] survivor

## 2023-01-11 NOTE — ASU PATIENT PROFILE, ADULT - NSICDXPASTSURGICALHX_GEN_ALL_CORE_FT
PAST SURGICAL HISTORY:  Gastric banding status 2008    H/O carpal tunnel repair bilateral w/ ulnar nerve intratment    H/O laminectomy 7/2019 and fusion, ID lumbar 8/2019    History of cholecystectomy     History of colonoscopy 5 years ago    History of gastric surgery 1/2002    History of right hip replacement     S/P cervical spinal fusion     S/P LASIK surgery

## 2023-01-12 ENCOUNTER — TRANSCRIPTION ENCOUNTER (OUTPATIENT)
Age: 58
End: 2023-01-12

## 2023-01-12 ENCOUNTER — APPOINTMENT (OUTPATIENT)
Dept: THORACIC SURGERY | Facility: HOSPITAL | Age: 58
End: 2023-01-12

## 2023-01-12 ENCOUNTER — OUTPATIENT (OUTPATIENT)
Dept: OUTPATIENT SERVICES | Facility: HOSPITAL | Age: 58
LOS: 1 days | Discharge: HOME | End: 2023-01-12
Payer: COMMERCIAL

## 2023-01-12 VITALS
DIASTOLIC BLOOD PRESSURE: 60 MMHG | WEIGHT: 182.1 LBS | HEIGHT: 67 IN | TEMPERATURE: 98 F | SYSTOLIC BLOOD PRESSURE: 92 MMHG | RESPIRATION RATE: 17 BRPM | OXYGEN SATURATION: 99 % | HEART RATE: 74 BPM

## 2023-01-12 VITALS
DIASTOLIC BLOOD PRESSURE: 63 MMHG | SYSTOLIC BLOOD PRESSURE: 90 MMHG | RESPIRATION RATE: 18 BRPM | OXYGEN SATURATION: 96 % | HEART RATE: 65 BPM

## 2023-01-12 DIAGNOSIS — Z98.1 ARTHRODESIS STATUS: Chronic | ICD-10-CM

## 2023-01-12 DIAGNOSIS — Z96.641 PRESENCE OF RIGHT ARTIFICIAL HIP JOINT: Chronic | ICD-10-CM

## 2023-01-12 DIAGNOSIS — J39.8 OTHER SPECIFIED DISEASES OF UPPER RESPIRATORY TRACT: ICD-10-CM

## 2023-01-12 PROCEDURE — 31622 DX BRONCHOSCOPE/WASH: CPT

## 2023-01-12 NOTE — BRIEF OPERATIVE NOTE - OPERATION/FINDINGS
Awake bronchoscopy, 50% collapse on expiration Awake bronchoscopy, 70% collapse on expiration to distal trachea; 70% collapse on expiration to right main stem bronchus, 60% collapse on expiration to left main stem bronchus.

## 2023-01-12 NOTE — ASU DISCHARGE PLAN (ADULT/PEDIATRIC) - NURSING INSTRUCTIONS
DO NOT HAVE ANYTHING TO EAT OR DRINK UNTIL 10:00 AM.  Start with a sip of water.  If you begin to cough, stop drinking and wait 1/2 hour before trying again.  Call MD if you have shortness of breath.

## 2023-01-12 NOTE — ASU DISCHARGE PLAN (ADULT/PEDIATRIC) - ASU DC SPECIAL INSTRUCTIONSFT
Follow up with Dr Alvin Stubbs in the outpatient thoracic surgery office in about 3 weeks.   Call the office at [phone]

## 2023-01-12 NOTE — ASU DISCHARGE PLAN (ADULT/PEDIATRIC) - CARE PROVIDER_API CALL
Alvin Stubbs (MD)  Surgery; Thoracic Surgery  [address] [place] [address] [place] [address]  Phone: [phone]  Fax: [phone]  Follow Up Time:

## 2023-01-12 NOTE — ASU PREOP CHECKLIST - BLOOD AVAILABLE
"Subjective     Andres Sow is a 84 year old male who presents to clinic today for the following health issues:    HPI   ED/UC Followup:    Facility:  5/20/19  Date of visit: [facility]  Reason for visit: UTI  Current Status: having pain with urination,       Seen in ER for blood in the urine.  Treated with cipro until they found the culture to be negative and he was told to stop. He's voiding only very small amounts (less than a shot glass full) at a time, about every 30 minutes.  Occasionally will have a bit more urine.  Unable to give us more than about 2-3 ml's here in clinic today.     RN attempted to catheterize him, due to extreme pain she wasn't able to get into his bladder or past his prostate? We do not have coude tip catheters in clinic unfortunately.  We are unable to bladder scan in clinic.       Reviewed and updated as needed this visit by Provider         Review of Systems   ROS COMP: Constitutional, HEENT, cardiovascular, pulmonary, gi and gu systems are negative, except as otherwise noted.      Objective    /55 (BP Location: Right arm, Cuff Size: Adult Regular)   Pulse 65   Temp 98  F (36.7  C) (Tympanic)   Resp 16   Ht 1.753 m (5' 9\")   Wt 72.6 kg (160 lb)   SpO2 95%   BMI 23.63 kg/m    Body mass index is 23.63 kg/m .  Physical Exam   GENERAL: healthy, alert and no distress  RESP: lungs clear to auscultation - no rales, rhonchi or wheezes  CV: regular rate and rhythm, normal S1 S2, no S3 or S4, no murmur, click or rub, no peripheral edema and peripheral pulses strong  ABDOMEN: soft, nontender, no hepatosplenomegaly, no masses and bowel sounds normal    Diagnostic Test Results:  Labs reviewed in Epic        Assessment & Plan     1. Gross hematuria  Concern for urinary retention, unable to cath in clinic, unable to bladder scan so cannot confirm this suspicion.   Given the very small amount of frequent urination, the negative urine culture, gross hematuria, etc, I suspect " prostate enlargement and retention. He does likely need further w/u to r/o  malignancy with CT abdomen/pelvis and will need urology follow up.   I will have him go back to ER for cath - would recommend placement and leaving it in 1-2 weeks until seen by urology.  He did start flomax three days ago and has noted no improvement.   - UA with Microscopic reflex to Culture  - UROLOGY ADULT REFERRAL  - CT Abdomen Pelvis w/o Contrast; Future      Tata Farley MD  [facility]     n/a

## 2023-01-12 NOTE — ASU DISCHARGE PLAN (ADULT/PEDIATRIC) - FOLLOW UP APPOINTMENTS
[facility] may also call Recovery Room (PACU) 24/7 @ [phone]/[facility]

## 2023-01-15 NOTE — ASU PATIENT PROFILE, ADULT - HEALTHCARE INFORMATION NEEDED, PROFILE
Anesthesia Pre-Procedure Evaluation    Patient: Leonidas Pratt   MRN: [id number] : 1968        Procedure : Procedure(s):  COLONOSCOPY          Past Medical History:   Diagnosis Date     Asthma      Kidney stone      Myasthenia gravis (H)      Strabismus      Thyroid disease       Past Surgical History:   Procedure Laterality Date     HERNIA REPAIR       LASER HOLMIUM LITHOTRIPSY URETER(S), INSERT STENT, COMBINED Right 10/15/2014    Procedure: COMBINED CYSTOSCOPY, URETEROSCOPY, LASER HOLMIUM LITHOTRIPSY URETER(S), INSERT STENT;  Surgeon: Thong Bates MD;  Location: [facility] OR     RECESSION RESECTION WITH ADJUSTABLE SUTURE  2012    LLRc 5.0mm on adj. LIRc 4.5mm on adj.     RECESSION RESECTION WITH ADJUSTABLE SUTURE BILATERAL  2012    RSRc 3.0mm; adj. suture     STRABISMUS SURGERY       THORACOSCOPIC THYMECTOMY N/A 3/22/2021    Procedure: SUBXIPHOID BILATERAL THORACOSCOPIC THYMECTOMY;  Surgeon: Benjy Hull MD;  Location: [facility] OR      Allergies   Allergen Reactions     Nkda [No Known Drug Allergies]       Social History     Tobacco Use     Smoking status: Never     Smokeless tobacco: Never   Substance Use Topics     Alcohol use: No      Wt Readings from Last 1 Encounters:   23 80.3 kg (177 lb)        Anesthesia Evaluation   Pt has had prior anesthetic. Type: General.    No history of anesthetic complications       ROS/MED HX  ENT/Pulmonary:     (+) Intermittent, asthma (Childhood asthma. Symptoms only with exercise.  No maintenance inhaler.  Well controlled without recent exacerbation. ) Treatment: Inhaler prn,      Neurologic: Comment: Myasthenia gravis (s/p thymectomy in 3/2021, currently only has ocular symptoms)      Cardiovascular:  - neg cardiovascular ROS     METS/Exercise Tolerance: >4 METS Comment: Enjoys lifting working out.    Hematologic:  - neg hematologic  ROS     Musculoskeletal:  - neg musculoskeletal ROS     GI/Hepatic: Comment: Hernia repair as a child.        Renal/Genitourinary:     (+) renal disease, Nephrolithiasis  (s/p lithotrypsy (2014)),     Endo:     (+) thyroid problem, hypothyroidism,     Psychiatric/Substance Use:  - neg psychiatric ROS     Infectious Disease:  - neg infectious disease ROS     Malignancy:  - neg malignancy ROS     Other:  - neg other ROS          Physical Exam    Airway  airway exam normal           Respiratory Devices and Support         Dental  no notable dental history         Cardiovascular   cardiovascular exam normal          Pulmonary   pulmonary exam normal                OUTSIDE LABS:  CBC:   Lab Results   Component Value Date    WBC 6.8 10/28/2022    WBC 8.1 04/13/2021    HGB 15.0 10/28/2022    HGB 13.2 (L) 04/13/2021    HCT 43.9 10/28/2022    HCT 39.4 (L) 04/13/2021     10/28/2022     04/13/2021     BMP:   Lab Results   Component Value Date     10/28/2022     04/13/2021    POTASSIUM 4.9 10/28/2022    POTASSIUM 4.4 04/13/2021    CHLORIDE 107 10/28/2022    CHLORIDE 107 04/13/2021    CO2 27 10/28/2022    CO2 27 04/13/2021    BUN 15.3 10/28/2022    BUN 16 04/13/2021    CR 1.06 10/28/2022    CR 0.92 04/13/2021    GLC 99 10/28/2022    GLC 86 04/13/2021     COAGS: No results found for: PTT, INR, FIBR  POC:   Lab Results   Component Value Date    BGM 86 03/22/2021     HEPATIC:   Lab Results   Component Value Date    ALBUMIN 4.4 10/28/2022    PROTTOTAL 7.2 10/28/2022    ALT 34 10/28/2022    AST 30 10/28/2022    ALKPHOS 101 10/28/2022    BILITOTAL 0.5 10/28/2022     OTHER:   Lab Results   Component Value Date    PH 7.28 (L) 03/22/2021    LACT 1.0 03/22/2021    A1C 5.2 10/14/2020    YOAN 9.8 10/28/2022    PHOS 3.5 04/13/2021    MAG 2.2 04/13/2021    TSH 0.15 (L) 10/28/2022    T4 1.53 10/28/2022    CRP <5.0 02/22/2011    SED 7 04/27/2011       Anesthesia Plan    ASA Status:  2   NPO Status:  NPO Appropriate    Anesthesia Type: MAC.     - Reason for MAC: straight local not clinically adequate   Induction: Intravenous.    Maintenance: TIVA.        Consents    Anesthesia Plan(s) and associated risks, benefits, and realistic alternatives discussed. Questions answered and patient/representative(s) expressed understanding.    - Discussed:     - Discussed with:  Patient         Postoperative Care       PONV prophylaxis: Ondansetron (or other 5HT-3)     Comments:                Dionisio Aguilar MD   none

## 2023-01-19 ENCOUNTER — APPOINTMENT (OUTPATIENT)
Dept: THORACIC SURGERY | Facility: CLINIC | Age: 58
End: 2023-01-19
Payer: COMMERCIAL

## 2023-01-19 VITALS
HEIGHT: 68 IN | WEIGHT: 183 LBS | SYSTOLIC BLOOD PRESSURE: 110 MMHG | DIASTOLIC BLOOD PRESSURE: 75 MMHG | BODY MASS INDEX: 27.74 KG/M2 | OXYGEN SATURATION: 98 % | HEART RATE: 80 BPM

## 2023-01-19 PROCEDURE — 99215 OFFICE O/P EST HI 40 MIN: CPT

## 2023-01-19 RX ORDER — PROMETHAZINE HYDROCHLORIDE AND DEXTROMETHORPHAN HYDROBROMIDE ORAL SOLUTION 15; 6.25 MG/5ML; MG/5ML
6.25-15 SOLUTION ORAL
Qty: 1 | Refills: 0 | Status: COMPLETED | COMMUNITY
Start: 2020-12-28 | End: 2023-01-19

## 2023-01-19 RX ORDER — PREDNISONE 10 MG/1
10 TABLET ORAL
Qty: 30 | Refills: 0 | Status: COMPLETED | COMMUNITY
Start: 2022-12-08 | End: 2023-01-19

## 2023-01-19 RX ORDER — CLOTRIMAZOLE 10 MG/1
10 LOZENGE ORAL
Qty: 50 | Refills: 0 | Status: COMPLETED | COMMUNITY
Start: 2020-08-07 | End: 2023-01-19

## 2023-01-20 NOTE — HISTORY OF PRESENT ILLNESS
[FreeTextEntry1] : Mr. 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 Justice Lama. \par \par PFTs on 1/24/22:  %, FEV1 126%, DLCO 130%.\par \par CT Chest on 9/7/22:\par - excessive collapse of the right mainstem bronchus and bronchus intermedius on expiration, consistent with bronchomalacia. No tracheomalacia\par - Multifocal patchy groundglass opacities are noted in bilateral upper and lower lobes, new from 2018 and likely infectious/inflammatory in etiology\par - Calcified granulomas are present\par - No thoracic adenopathy\par - Small hiatal hernia\par - s/p gastric bypass surgery with a gastric lap band present\par \par Spirometry on 9/23/22: FVC 98%, FEV1 113%.\par \par Now s/p awake bronchoscopy on 1/12/23. Upon deep exhalation, I was able to appreciate approximately 70% posterior collapse of the distal trachea and 70% collapse of the right mainstem bronchus. There was approximately 60% collapse of the left mainstem bronchus.\par \par Pt presents today for follow up.  Admits to SOB, hoarseness, and severe dry cough.\par \par

## 2023-01-20 NOTE — CONSULT LETTER
[Dear  ___] : Dear  [unfilled], [Consult Letter:] : I had the pleasure of evaluating your patient, [unfilled]. [( Thank you for referring [unfilled] for consultation for _____ )] : Thank you for referring [unfilled] for consultation for [unfilled] [Please see my note below.] : Please see my note below. [Consult Closing:] : Thank you very much for allowing me to participate in the care of this patient.  If you have any questions, please do not hesitate to contact me. [Sincerely,] : Sincerely, [FreeTextEntry2] : Dr. Justice Lama (Pulm/Referring) [FreeTextEntry3] : Alvin Stubbs MD, MPH \par System Director of Thoracic Surgery \par Director of Comprehensive Lung and Foregut [place] \par Professor Cardiovascular & Thoracic Surgery  \par [facility] [employer] [facility]\par \par [facility]\par [address]\par Oncology [address]\[place] [address]\par Tel: [phone]\par Fax: [phone]\par

## 2023-01-20 NOTE — ASSESSMENT
[FreeTextEntry1] : Mr. 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 Justice Lama. \par \par Now s/p awake bronchoscopy on 1/12/23. Upon deep exhalation, I was able to appreciate approximately 70% posterior collapse of the distal trachea and 70% collapse of the right mainstem bronchus. There was approximately 60% collapse of the left mainstem bronchus.\par \par I have reviewed the patient's medical records and diagnostic images at time of this office consultation and have made the following recommendation:\par 1. Awake bronch reviewed, case d/w Dr. Lama, given patient's s/s and poor quality of life, I recommended a Rt VATS R.A. tracheobronchoplasty on 2/14/23. Risks and benefits and alternatives explained to patient, all questions answered, patient agreed to proceed with surgery.\par 2. Medical and cardiac clearances, PST, COVID-testing\par \par \par I, ANGELIA George, personally performed the evaluation and management (E/M) services for this established patient who presents today with (a) new problem(s)/exacerbation of (an) existing condition(s). That E/M includes conducting the examination, assessing all new/exacerbated conditions, and establishing a new plan of care. Today, my ACP, Saba Root NP was here to observe my evaluation and management services for this new problem/exacerbated condition to be followed going forward.\par \par

## 2023-01-26 ENCOUNTER — APPOINTMENT (OUTPATIENT)
Dept: PULMONOLOGY | Facility: CLINIC | Age: 58
End: 2023-01-26
Payer: COMMERCIAL

## 2023-01-26 VITALS
WEIGHT: 184 LBS | HEART RATE: 77 BPM | SYSTOLIC BLOOD PRESSURE: 98 MMHG | BODY MASS INDEX: 27.89 KG/M2 | DIASTOLIC BLOOD PRESSURE: 68 MMHG | RESPIRATION RATE: 16 BRPM | OXYGEN SATURATION: 98 % | HEIGHT: 68 IN | TEMPERATURE: 97.5 F

## 2023-01-26 DIAGNOSIS — Z01.811 ENCOUNTER FOR PREPROCEDURAL RESPIRATORY EXAMINATION: ICD-10-CM

## 2023-01-26 PROCEDURE — ZZZZZ: CPT

## 2023-01-26 PROCEDURE — 94729 DIFFUSING CAPACITY: CPT

## 2023-01-26 PROCEDURE — 94010 BREATHING CAPACITY TEST: CPT

## 2023-01-26 PROCEDURE — 94618 PULMONARY STRESS TESTING: CPT

## 2023-01-26 PROCEDURE — 95012 NITRIC OXIDE EXP GAS DETER: CPT

## 2023-01-26 PROCEDURE — 94727 GAS DIL/WSHOT DETER LNG VOL: CPT

## 2023-01-26 PROCEDURE — 99214 OFFICE O/P EST MOD 30 MIN: CPT | Mod: CS,25

## 2023-01-26 RX ORDER — BETHANECHOL CHLORIDE 5 MG/1
5 TABLET ORAL
Qty: 90 | Refills: 5 | Status: ACTIVE | COMMUNITY
Start: 2023-01-26 | End: 1900-01-01

## 2023-01-26 NOTE — ADDENDUM
[FreeTextEntry1] : Documented by Imtiaz Sousa acting as a scribe for Dr. Justice Lama on 01/26/2023.\par \par All medical record entries made by the Scribe were at my, Dr. Justice Lama's, direction and personally dictated by me on 01/26/2023. I have reviewed the chart and agree that the record accurately reflects my personal performance of the history, physical exam, assessment and plan. I have also personally directed, reviewed, and agree with the discharge instructions.

## 2023-01-26 NOTE — PHYSICAL EXAM
[No Acute Distress] : no acute distress [Normal Oropharynx] : normal oropharynx [Normal Appearance] : normal appearance [No Neck Mass] : no neck mass [Normal Rate/Rhythm] : normal rate/rhythm [Normal S1, S2] : normal s1, s2 [No Murmurs] : no murmurs [No Resp Distress] : no resp distress [No Abnormalities] : no abnormalities [Benign] : benign [Normal Gait] : normal gait [No Clubbing] : no clubbing [No Cyanosis] : no cyanosis [No Edema] : no edema [FROM] : FROM [Normal Color/ Pigmentation] : normal color/ pigmentation [No Focal Deficits] : no focal deficits [Oriented x3] : oriented x3 [Normal Affect] : normal affect [III] : Mallampati Class: III [TextBox_68] : I:E 1:3; very mild forced expiratory wheezes

## 2023-01-26 NOTE — PROCEDURE
[FreeTextEntry1] : Full PFT revealed normal flows, with a FEV1 of 3.20L, which is 97% of predicted, normal lung volumes, and diffusion of 19.5 or 83% of predicted, with a normal flow volume loop\par \par Feno was unable to be performed; a normal value being less than 25. Fractional exhaled nitric oxide (FENO) is regarded as a simple, noninvasive method for assessing eosinophilic airway inflammation. Produced by a variety of cells within the lung, nitric oxide (NO) concentrations are generally low in healthy individuals. However, high concentrations of NO appear to be involved in nonspecific host defense mechanisms and chronic inflammatory  diseases such as asthma. The American Thoracic Society (ATS) therefore recommended using FENO to aid in the diagnosis and monitoring of eosinophilic airway inflammation and asthma, and for identifying steroid responsive individuals whose chronic respiratory symptoms may be caused by airway inflammation \par \par 6 minute walk test reveals a low saturation of 99% with evidence of slight dyspnea or fatigue; walked 391.2 meters

## 2023-01-26 NOTE — ASSESSMENT
[FreeTextEntry1] : **********************ALL PRINTED SCRIPTS TO BE FILLED THROUGH [facility] FUND*************************\par \par Mr. Corrales is a 57 year old male who has a history of asthma, allergy, GERD, cervicale spine Dz (s/p surgery 12/19)  and abnormal CT. He is s/p [facility]TC exposure -  s/p complicated cervical spine surgery (12/2019) now s/p COVID 19 12/2020- s/p THR (right) 5/2021- s/p COVID-19 vaccine reaction 12/2021 - +TBM right Bronchomalacia - awaiting TBM surgery ([facility] - Preop)\par \par ******************************************************Pre-op Clearance*************************************************************** \par \par problem 1: abnormal chest CT - c/w inflammation - ?COVID-19 residue (improved \par -PPD Quantiferon gold (-), ESR / Hypersensitivity panel (wnl)\par -Follow up Chest CT 3/2021 - 9/2021, 3/2022, next 3/2023\par \par problem 2: asthma (semi persistent) \par -continue Breztri 2 puffs BID = Symbicort/Spiriva/Asmanex \par -Continue Asmanex 200 2 inhalations BID\par -continue to use Symbicort 160 at 2 inhalations BID\par -continue Spiriva at 2 inhalation QD \par -continue to use Singulair 10 mg QHS \par -continue to use Xopenex PRN / Xopenex 0.63 via nebulizer q6H prn\par -Asthma is  believed to be caused by inherited (genetic) and environmental factor, but its exact cause is unknown. Asthma may be triggered by allergens, lung infections, or irritants in the air. Asthma triggers are different for each person\par -Inhaler technique reviewed as well as oral hygiene techniques reviewed with patient. Avoidance of cold air, extremes of temperature, rescue inhaler should be used before exercise. Order of medication reviewed with patient. Recommended use of a cool mist humidifier in the bedroom.\par \par problem 2A: Biologic evaluation\par - Script given for blood work: asthma profile, food IgE panel, eosinophil level, IgE level, Vitamin D level \par - Tezspire etc \par - The safety and efficacy of Nucala was established in three double-blind, randomized, placebo-controlled trials in patients with severe asthma. Compared to a placebo, patients with severe asthma receiving Nucala had fewer exacerbation requiring hospitalization and/or emergency department visits, and a longer time to first exacerbation. In addition, patients with severe asthma receiving Nucala or Fasenra experienced greater reductions in their daily maintenance oral corticosteroid dose, while maintaining asthma control compared with patients receiving placebo. Treatment with Nucala did not result in a significant improvement in lung function, as measured by the volume of air exhaled by patients in one second. The most common side effects include: headache, injection site reactions, back pain, weakness, and fatigue; hypersensitivity reactions can occur within hours or days including swelling of the face, mouth, and tongue, fainting, dizziness, hives, breathing problems, and rash; herpes zoster infections have occurred. The drug is a monoclonal antibody that inhibits interleukin-5 which helps regular eosinophils, a type of white blood cell that contributes to asthma. The over-production of eosinophils can cause inflammation in the lungs, increasing the frequency of asthma attacks. Patients must also take other medications, including high dose inhaled corticosteroids and at least one additional asthma drug. \par \par problem 3: cough; ? Sensory neuropathic cough (BM)\par -off Amitriptyline 10 mg up to TID, prn (DC if able)\par -Neurontin 100 Q8H \par -continue Promethazine DM prn\par Sensory neuropathic cough is an etiology of cough that is often realized once someone has been ruled out for common disease such as: asthma, COPD, eosinophilic bronchitis, bronchiectasis, post nasal drip, and GERD. It sometimes develops following a URI, herpes zoster outbreak in pharynx or thyroid or cervical spine injury. However, many patients have no identifiable antecedent explanation. \par \par Problem 3A: +TBM right Bronchomalacia \par -s/p Dynamic CT- CTS eval Dr Alvin Stubbs\par -Tracheomalacia is usually acquired in adults and common causes include damage by tracheostomy or endotracheal intubation damaging the tracheal cartilage with increase risk with multiple intubations, prolonged intubation, and concurrent high dose steroid therapy; external chest wall trauma and surgery; chronic compression of the trachea by benign etiologies (eg, benign mediastinal goiter) or malignancy; relapsing polychondritis; or recurrent infection. Tracheomalacia can be asymptomatic, however signs or symptoms can develop as the severity of the airway narrowing progresses with major symptoms include dyspnea, cough, and sputum retention. Other symptoms include severe paroxysms of coughing, wheezing or stridor, barking cough and may be exacerbated by forced expiration, cough, and valsalva maneuver. Tracheomalacia is diagnosed by a bronchoscopic visualization of dynamic airway collapse on dynamic chest CT. Therapy is warranted in symptomatic patients with severe tracheomalacia and includes surgical repair as tracheobronchoplasty. The patient was referred to Dr. Alvin Stubbs or Dr. Vinny Lilly, at [facility] for a surgical consult. \par \par Problem 3B: Pulmonary Pre-Op Clearance for Bronchomalacia and Tracheomalacia Surgery\par -at this point in time there are no absolute pulmonary contraindications to go forward with the planned procedure \par -at the time of surgery s/he should have optimal pain control, incentive spirometry, early ambulation, DVT and GI prophylaxis. \par \par problem 4: allergic rhinitis \par -recommended to use "Navage" nasal rinse device \par -continue Claritin 10 mg QAM \par -continue to use Xyzal 5 mg QHS\par -continue to use Omnaris 1 sniff/nostril BID\par -continue Olopatadine 1 sniff each nostril BID (.6)\par -follow up with Dr. Ok Islas  - s/p turbinectomy \par -Environmental measures for allergies were encouraged including mattress and pillow cover, air purifier, and environmental controls.\par \par Problem 4A: Laryngospasm\par -Speech Rx\par -trial of bethanechol 5 mg every 8 hours\par \par problem 5: GERD (FeeSST- c/w LPR)\par -continue to use Dexilant 60 mg before first meal\par -continue to use Pepcid 40 mg QHS \par -Rule of 2s: avoid eating too much, eating too late, eating too spicy, eating two hours before bed\par -Things to avoid including overeating, spicy foods, tight clothing, eating within three hours of bed, this list is not all inclusive. \par -For treatment of reflux, possible options discussed including diet control, H2 blockers, PPIs, as well as coating motility agents discussed as treatment options. Timing of meals and proximity of last meal to sleep were discussed. If symptoms persist, a formal gastrointestinal evaluation is needed. \par \par problem 6: insomnia\par - medical marijuana\par -continue to use Ambien PRN \par -recommended good sleep hygiene\par -Good sleep hygiene was encouraged including avoiding watching television an hour before bed, keeping caffeine at a low,  avoiding reading, television, or anything, in bed, no drinking any liquids three hours before bedtime, and only getting into bed when tired and ready for sleep.\par \par problem 7: foot cramps (resolved)\par -continue to use MyoCalm PM\par \par Problem 8a: s/p COVID 19 infection 12/2020 (residual Sx) \par -Recommended not to get an additional COVID-19 booster at this time until it is updated against the current variants. If planning on travelling, obtaining another booster within 2 weeks of departure is recommended. \par -Covid 19 preclusions, vaccine and booster discussed at length and recommended \par - S/p Covid 19 vaccine (Moderna) x3 (12/2021)\par -s/p MAB infusion \par -educated patient on COVID 19 vaccine and appropriate recommendations \par -vaccine pending\par COVID-19 precautionary Immune Support Recommendations:\par -OTC Vitamin C 1000mg BID \par -OTC Quercetin 500mg BID \par -OTC Zinc 50 mg per day \par -OTC Melatonin 5 mg a night \par -OTC Vitamin D 2000mg per day \par -OTC Tonic Water 8oz per day\par -Water 0.5-1 gallon per day\par Additional Support Supplements: \par -Liposomal Glutathione 500 mg BID\par -SPM 1 tablet BID \par -Berberine 1000 mg BID  \par - mg BID \par \par problem 8: health maintenance\par -PPD negative as of 9/20/2021\par -received flu shot in - 9/2021\par -recommended strep pneumonia vaccines: Prevnar-13 vaccine, followed by Pneumo vaccine 23 on year following\par -recommended early intervention for URIs\par -recommended osteoporosis evaluations\par -recommended early dermatological evaluations\par -recommended after the age of 50 to the age of 70, colonoscopy every 5 years\par -encouraged early intervention\par \par \par F/U in 4 months\par He is encouraged to call with any changes, concerns, or questions. \par \par ***********************ALL PRINTED SCRIPTS TO BE FILLED THROUGH [facility]D************************************

## 2023-01-27 ENCOUNTER — APPOINTMENT (OUTPATIENT)
Dept: PULMONOLOGY | Facility: CLINIC | Age: 58
End: 2023-01-27

## 2023-02-01 ENCOUNTER — OUTPATIENT (OUTPATIENT)
Dept: OUTPATIENT SERVICES | Facility: HOSPITAL | Age: 58
LOS: 1 days | End: 2023-02-01

## 2023-02-01 VITALS
OXYGEN SATURATION: 99 % | HEART RATE: 66 BPM | WEIGHT: 186.07 LBS | HEIGHT: 67.75 IN | TEMPERATURE: 97 F | DIASTOLIC BLOOD PRESSURE: 69 MMHG | SYSTOLIC BLOOD PRESSURE: 102 MMHG | RESPIRATION RATE: 16 BRPM

## 2023-02-01 DIAGNOSIS — Z98.890 OTHER SPECIFIED POSTPROCEDURAL STATES: Chronic | ICD-10-CM

## 2023-02-01 DIAGNOSIS — Z98.1 ARTHRODESIS STATUS: Chronic | ICD-10-CM

## 2023-02-01 DIAGNOSIS — J39.8 OTHER SPECIFIED DISEASES OF UPPER RESPIRATORY TRACT: ICD-10-CM

## 2023-02-01 DIAGNOSIS — Z90.49 ACQUIRED ABSENCE OF OTHER SPECIFIED PARTS OF DIGESTIVE TRACT: Chronic | ICD-10-CM

## 2023-02-01 DIAGNOSIS — Z98.84 BARIATRIC SURGERY STATUS: Chronic | ICD-10-CM

## 2023-02-01 DIAGNOSIS — I10 ESSENTIAL (PRIMARY) HYPERTENSION: ICD-10-CM

## 2023-02-01 DIAGNOSIS — Z96.641 PRESENCE OF RIGHT ARTIFICIAL HIP JOINT: Chronic | ICD-10-CM

## 2023-02-01 DIAGNOSIS — R06.09 OTHER FORMS OF DYSPNEA: ICD-10-CM

## 2023-02-01 DIAGNOSIS — J45.909 UNSPECIFIED ASTHMA, UNCOMPLICATED: ICD-10-CM

## 2023-02-01 LAB
BLD GP AB SCN SERPL QL: NEGATIVE — SIGNIFICANT CHANGE UP
HCT VFR BLD CALC: 46.8 % — SIGNIFICANT CHANGE UP (ref 39–50)
HGB BLD-MCNC: 15 G/DL — SIGNIFICANT CHANGE UP (ref 13–17)
MCHC RBC-ENTMCNC: 26.5 PG — LOW (ref 27–34)
MCHC RBC-ENTMCNC: 32.1 GM/DL — SIGNIFICANT CHANGE UP (ref 32–36)
MCV RBC AUTO: 82.7 FL — SIGNIFICANT CHANGE UP (ref 80–100)
NRBC # BLD: 0 /100 WBCS — SIGNIFICANT CHANGE UP (ref 0–0)
NRBC # FLD: 0 K/UL — SIGNIFICANT CHANGE UP (ref 0–0)
PLATELET # BLD AUTO: 186 K/UL — SIGNIFICANT CHANGE UP (ref 150–400)
RBC # BLD: 5.66 M/UL — SIGNIFICANT CHANGE UP (ref 4.2–5.8)
RBC # FLD: 20.7 % — HIGH (ref 10.3–14.5)
RH IG SCN BLD-IMP: POSITIVE — SIGNIFICANT CHANGE UP
WBC # BLD: 4.71 K/UL — SIGNIFICANT CHANGE UP (ref 3.8–10.5)
WBC # FLD AUTO: 4.71 K/UL — SIGNIFICANT CHANGE UP (ref 3.8–10.5)

## 2023-02-01 RX ORDER — SODIUM CHLORIDE 9 MG/ML
1000 INJECTION, SOLUTION INTRAVENOUS
Refills: 0 | Status: DISCONTINUED | OUTPATIENT
Start: 2023-02-14 | End: 2023-02-16

## 2023-02-01 RX ORDER — DIAZEPAM 5 MG
1 TABLET ORAL
Qty: 0 | Refills: 0 | DISCHARGE

## 2023-02-01 RX ORDER — SODIUM CHLORIDE 9 MG/ML
3 INJECTION INTRAMUSCULAR; INTRAVENOUS; SUBCUTANEOUS EVERY 8 HOURS
Refills: 0 | Status: DISCONTINUED | OUTPATIENT
Start: 2023-02-14 | End: 2023-02-14

## 2023-02-01 RX ORDER — LUMATEPERONE 10.5 MG/1
1 CAPSULE ORAL
Qty: 0 | Refills: 0 | DISCHARGE

## 2023-02-01 RX ORDER — TIOTROPIUM BROMIDE 18 UG/1
2 CAPSULE ORAL; RESPIRATORY (INHALATION)
Qty: 0 | Refills: 0 | DISCHARGE

## 2023-02-01 RX ORDER — IPRATROPIUM BROMIDE 21 MCG
2 AEROSOL, SPRAY (ML) NASAL
Qty: 0 | Refills: 0 | DISCHARGE

## 2023-02-01 NOTE — H&P PST ADULT - ASSESSMENT
56 y/o male who was a 9/11 [facility]  presents to PST preop for robotic right video assisted thoracoscopy, tracheobronchoplasty surgery. Pt s/p awake bronchoscopy on 1/12/23 which noted 70% collapse of distal trachea and 70% collapse of the right mainstem bronchus. Pt reports chronic shortness of breath that has gotten progressively worse.

## 2023-02-01 NOTE — H&P PST ADULT - NS SC CAGE ALCOHOL CUT DOWN
no Constitutional: no fever, chills, no recent weight loss, change in appetite or malaise  Eyes: no redness/discharge/pain/vision changes  ENT: no rhinorrhea/ear pain/sore throat  Cardiac: No chest pain, SOB or edema.  Respiratory: No cough or respiratory distress  GI: No nausea, vomiting, diarrhea or abdominal pain.  : No dysuria, frequency, urgency or hematuria  MS: no pain to back or other extremities, no loss of ROM  Neuro: No headache; No LOC.  Skin: No skin rash.  Except as documented in the HPI, all other systems are negative.

## 2023-02-01 NOTE — H&P PST ADULT - PROBLEM SELECTOR PLAN 1
preop for robotic right video assisted thoracoscopy, tracheobronchoplasty surgery on 2/14/23  preop instructions given, pt verbalized understanding  pt will take prescribed Dexilant AM of surgery for GI prophylaxis   chlorhexidine wash provided  pt informed COVID testing must be done 72 hours prior to surgery  cbc, type pending   bmp results in chart from 1/3/23

## 2023-02-01 NOTE — H&P PST ADULT - CARDIOVASCULAR
regular rate and rhythm/S1 S2 present/vascular details… regular rate and rhythm/S1 S2 present/no murmur/no pedal edema/vascular

## 2023-02-01 NOTE — H&P PST ADULT - ANESTHESIA, PREVIOUS REACTION, PROFILE
post anesthesia tremors- after cervical spine surgery- demerol helped post anesthesia tremors after cervical spine surgery- demerol helped

## 2023-02-01 NOTE — H&P PST ADULT - GASTROINTESTINAL
soft/nontender/nondistended/normal active bowel sounds/no guarding/no rigidity details… soft/nontender/nondistended/normal active bowel sounds/no guarding

## 2023-02-01 NOTE — H&P PST ADULT - MUSCULOSKELETAL
ROM intact/no calf tenderness/strength 5/5 bilateral upper extremities/strength 5/5 bilateral lower extremities details… ROM intact/no calf tenderness/normal gait/strength 5/5 bilateral upper extremities/strength 5/5 bilateral lower extremities

## 2023-02-01 NOTE — H&P PST ADULT - RESPIRATORY AND THORAX COMMENTS
Addended by: PIERRE SOLANO on: 8/2/2022 01:00 PM     Modules accepted: Orders     h/o asthma since 09/2011, controlled on inhalers, complaints of dry cough and occasional SOB for last  >6 months. Follows up with Dr. Lama. preop dx of other specified diseases of upper respiratory tract. see HPI h/o asthma since 09/2011, controlled on inhalers. Pt dry cough and occasional SOB for last  >6 months. Follows up with Dr. Lama. preop dx of other specified diseases of upper respiratory tract. see HPI

## 2023-02-01 NOTE — H&P PST ADULT - NSICDXPASTSURGICALHX_GEN_ALL_CORE_FT
PAST SURGICAL HISTORY:  Gastric banding status 2008    H/O abdominoplasty     H/O carpal tunnel repair bilateral w/ ulnar nerve intratment    H/O decompression of ulnar nerve     H/O laminectomy 7/2019 and fusion, ID lumbar 8/2019    H/O nasal septoplasty     History of cholecystectomy     History of colonoscopy 5 years ago    History of gastric surgery 1/2002    History of right hip replacement     S/P bronchoscopy     S/P cervical spinal fusion     S/P LASIK surgery

## 2023-02-01 NOTE — H&P PST ADULT - RESPIRATORY COMMENTS
preop diagnosis - tracheobronchial malacia preop dx of other specified diseases of upper respiratory tract. see HPI

## 2023-02-01 NOTE — H&P PST ADULT - PROBLEM SELECTOR PLAN 2
pt will use Spiriva, Symbicort and Asmanex inhalers AM of surgery as prescribed  pulmonary evaluation in chart

## 2023-02-01 NOTE — H&P PST ADULT - NECK
normal/supple/symmetric/no tracheal deviation/no thyromegaly/no palpable masses FROM/supple/symmetric/no tracheal deviation/no thyromegaly/no palpable masses

## 2023-02-01 NOTE — H&P PST ADULT - PSYCHIATRIC
details… normal affect/alert and oriented x3/anxious normal affect/alert and oriented x3/normal behavior

## 2023-02-01 NOTE — H&P PST ADULT - NEGATIVE ENMT SYMPTOMS
no hearing difficulty/no ear pain/no tinnitus/no vertigo/no sinus symptoms/no nasal congestion/no dry mouth/no throat pain/no dysphagia

## 2023-02-01 NOTE — H&P PST ADULT - NEUROLOGICAL
negative normal/cranial nerves II-XII intact/sensation intact cranial nerves II-XII intact/sensation intact/responds to pain/responds to verbal commands

## 2023-02-01 NOTE — H&P PST ADULT - CARDIOVASCULAR COMMENTS
METS- patient reports he can walk 1-2 blocks, slowly and climb 1-2 flights slowly with no chest pain or dyspnea on exertion 4 h/o HTN

## 2023-02-01 NOTE — H&P PST ADULT - HISTORY OF PRESENT ILLNESS
56 y/o male who was a 9/11 [facility]  presents to PST preop for robotic right video assisted thoracoscopy, tracheobronchoplasty surgery. Pt s/p awake bronchoscopy on 1/12/23 which noted 70% collapse of distal trachea and 70% collapse of the right mainstem bronchus. Pt reports chronic shortness of breath that has gotten progressively worse.

## 2023-02-10 ENCOUNTER — LABORATORY RESULT (OUTPATIENT)
Age: 58
End: 2023-02-10

## 2023-02-13 ENCOUNTER — TRANSCRIPTION ENCOUNTER (OUTPATIENT)
Age: 58
End: 2023-02-13

## 2023-02-13 NOTE — ASU PATIENT PROFILE, ADULT - NSICDXPASTMEDICALHX_GEN_ALL_CORE_FT
PAST MEDICAL HISTORY:  Abnormal chest CT     Anemia     Anxiety     Asthma no previous intubation for asthma, reactive airway    Bronchomalacia     Disc displacement, lumbar     Dysphonia     Gastroesophageal reflux disease with hiatal hernia     GERD (gastroesophageal reflux disease)     History of obesity s/p gastric banding 2007 (Pt refuses to deflate lap band for surgery 12/11/19    History of tracheomalacia     HTN (hypertension)     Insomnia     Lung nodule bilateral    Other specified diseases of upper respiratory tract     Other spondylosis with radiculopathy, cervical region     PTSD (post-traumatic stress disorder) [employer] survivor

## 2023-02-14 ENCOUNTER — RESULT REVIEW (OUTPATIENT)
Age: 58
End: 2023-02-14

## 2023-02-14 ENCOUNTER — APPOINTMENT (OUTPATIENT)
Dept: THORACIC SURGERY | Facility: HOSPITAL | Age: 58
End: 2023-02-14

## 2023-02-14 ENCOUNTER — INPATIENT (INPATIENT)
Facility: HOSPITAL | Age: 58
LOS: 1 days | Discharge: HOME | End: 2023-02-16
Attending: THORACIC SURGERY (CARDIOTHORACIC VASCULAR SURGERY) | Admitting: THORACIC SURGERY (CARDIOTHORACIC VASCULAR SURGERY)
Payer: COMMERCIAL

## 2023-02-14 ENCOUNTER — TRANSCRIPTION ENCOUNTER (OUTPATIENT)
Age: 58
End: 2023-02-14

## 2023-02-14 VITALS
HEIGHT: 67.75 IN | OXYGEN SATURATION: 100 % | HEART RATE: 79 BPM | RESPIRATION RATE: 18 BRPM | TEMPERATURE: 98 F | DIASTOLIC BLOOD PRESSURE: 55 MMHG | WEIGHT: 186.07 LBS | SYSTOLIC BLOOD PRESSURE: 101 MMHG

## 2023-02-14 DIAGNOSIS — Z90.49 ACQUIRED ABSENCE OF OTHER SPECIFIED PARTS OF DIGESTIVE TRACT: Chronic | ICD-10-CM

## 2023-02-14 DIAGNOSIS — Z96.641 PRESENCE OF RIGHT ARTIFICIAL HIP JOINT: Chronic | ICD-10-CM

## 2023-02-14 DIAGNOSIS — Z98.890 OTHER SPECIFIED POSTPROCEDURAL STATES: Chronic | ICD-10-CM

## 2023-02-14 DIAGNOSIS — J39.8 OTHER SPECIFIED DISEASES OF UPPER RESPIRATORY TRACT: ICD-10-CM

## 2023-02-14 DIAGNOSIS — Z98.84 BARIATRIC SURGERY STATUS: Chronic | ICD-10-CM

## 2023-02-14 DIAGNOSIS — Z98.1 ARTHRODESIS STATUS: Chronic | ICD-10-CM

## 2023-02-14 LAB — RH IG SCN BLD-IMP: POSITIVE — SIGNIFICANT CHANGE UP

## 2023-02-14 PROCEDURE — 31770 REPAIR/GRAFT OF BRONCHUS: CPT | Mod: AS

## 2023-02-14 PROCEDURE — S2900 ROBOTIC SURGICAL SYSTEM: CPT | Mod: NC

## 2023-02-14 PROCEDURE — 88305 TISSUE EXAM BY PATHOLOGIST: CPT | Mod: 26

## 2023-02-14 PROCEDURE — 31760 TRACHEOPLASTY INTRATHORACIC: CPT

## 2023-02-14 PROCEDURE — 99233 SBSQ HOSP IP/OBS HIGH 50: CPT

## 2023-02-14 PROCEDURE — 71045 X-RAY EXAM CHEST 1 VIEW: CPT | Mod: 26

## 2023-02-14 PROCEDURE — 31760 TRACHEOPLASTY INTRATHORACIC: CPT | Mod: AS

## 2023-02-14 PROCEDURE — 31645 BRNCHSC W/THER ASPIR 1ST: CPT

## 2023-02-14 PROCEDURE — 31770 REPAIR/GRAFT OF BRONCHUS: CPT

## 2023-02-14 PROCEDURE — 32674 THORACOSCOPY LYMPH NODE EXC: CPT

## 2023-02-14 PROCEDURE — 32674 THORACOSCOPY LYMPH NODE EXC: CPT | Mod: AS

## 2023-02-14 DEVICE — MESH HERNIA MARLEX 1 X 4": Type: IMPLANTABLE DEVICE | Site: RIGHT | Status: FUNCTIONAL

## 2023-02-14 DEVICE — KIT A-LINE 1LUM 20G X 12CM SAFE KIT: Type: IMPLANTABLE DEVICE | Site: RIGHT | Status: FUNCTIONAL

## 2023-02-14 DEVICE — CHEST DRAIN THORACIC ARGYLE PVC 28FR STRAIGHT: Type: IMPLANTABLE DEVICE | Site: RIGHT | Status: FUNCTIONAL

## 2023-02-14 RX ORDER — HYDROMORPHONE HYDROCHLORIDE 2 MG/ML
30 INJECTION INTRAMUSCULAR; INTRAVENOUS; SUBCUTANEOUS
Refills: 0 | Status: DISCONTINUED | OUTPATIENT
Start: 2023-02-14 | End: 2023-02-14

## 2023-02-14 RX ORDER — DIPHENHYDRAMINE HCL 50 MG
50 CAPSULE ORAL EVERY 4 HOURS
Refills: 0 | Status: DISCONTINUED | OUTPATIENT
Start: 2023-02-14 | End: 2023-02-16

## 2023-02-14 RX ORDER — MOMETASONE FUROATE 220 UG/1
1 INHALANT RESPIRATORY (INHALATION)
Qty: 0 | Refills: 0 | DISCHARGE

## 2023-02-14 RX ORDER — ACETAMINOPHEN 500 MG
975 TABLET ORAL ONCE
Refills: 0 | Status: COMPLETED | OUTPATIENT
Start: 2023-02-14 | End: 2023-02-14

## 2023-02-14 RX ORDER — PANTOPRAZOLE SODIUM 20 MG/1
40 TABLET, DELAYED RELEASE ORAL
Refills: 0 | Status: DISCONTINUED | OUTPATIENT
Start: 2023-02-14 | End: 2023-02-16

## 2023-02-14 RX ORDER — HYDROMORPHONE HYDROCHLORIDE 2 MG/ML
30 INJECTION INTRAMUSCULAR; INTRAVENOUS; SUBCUTANEOUS
Refills: 0 | Status: DISCONTINUED | OUTPATIENT
Start: 2023-02-14 | End: 2023-02-16

## 2023-02-14 RX ORDER — IPRATROPIUM BROMIDE 21 MCG
2 AEROSOL, SPRAY (ML) NASAL
Qty: 0 | Refills: 0 | DISCHARGE

## 2023-02-14 RX ORDER — DIAZEPAM 5 MG
2 TABLET ORAL
Qty: 0 | Refills: 0 | DISCHARGE

## 2023-02-14 RX ORDER — MEPERIDINE HYDROCHLORIDE 50 MG/ML
25 INJECTION INTRAMUSCULAR; INTRAVENOUS; SUBCUTANEOUS ONCE
Refills: 0 | Status: DISCONTINUED | OUTPATIENT
Start: 2023-02-14 | End: 2023-02-14

## 2023-02-14 RX ORDER — LEVALBUTEROL 1.25 MG/.5ML
2 SOLUTION, CONCENTRATE RESPIRATORY (INHALATION)
Qty: 0 | Refills: 0 | DISCHARGE

## 2023-02-14 RX ORDER — GABAPENTIN 400 MG/1
300 CAPSULE ORAL ONCE
Refills: 0 | Status: COMPLETED | OUTPATIENT
Start: 2023-02-14 | End: 2023-02-14

## 2023-02-14 RX ORDER — VERAPAMIL HCL 240 MG
120 CAPSULE, EXTENDED RELEASE PELLETS 24 HR ORAL EVERY 12 HOURS
Refills: 0 | Status: DISCONTINUED | OUTPATIENT
Start: 2023-02-14 | End: 2023-02-14

## 2023-02-14 RX ORDER — TIOTROPIUM BROMIDE 18 UG/1
2 CAPSULE ORAL; RESPIRATORY (INHALATION)
Qty: 0 | Refills: 0 | DISCHARGE

## 2023-02-14 RX ORDER — ACETAMINOPHEN 500 MG
1000 TABLET ORAL ONCE
Refills: 0 | Status: DISCONTINUED | OUTPATIENT
Start: 2023-02-14 | End: 2023-02-16

## 2023-02-14 RX ORDER — HYDROMORPHONE HYDROCHLORIDE 2 MG/ML
0.5 INJECTION INTRAMUSCULAR; INTRAVENOUS; SUBCUTANEOUS
Refills: 0 | Status: DISCONTINUED | OUTPATIENT
Start: 2023-02-14 | End: 2023-02-16

## 2023-02-14 RX ORDER — TIOTROPIUM BROMIDE 18 UG/1
2 CAPSULE ORAL; RESPIRATORY (INHALATION) DAILY
Refills: 0 | Status: DISCONTINUED | OUTPATIENT
Start: 2023-02-14 | End: 2023-02-16

## 2023-02-14 RX ORDER — HEPARIN SODIUM 5000 [USP'U]/ML
5000 INJECTION INTRAVENOUS; SUBCUTANEOUS ONCE
Refills: 0 | Status: COMPLETED | OUTPATIENT
Start: 2023-02-14 | End: 2023-02-14

## 2023-02-14 RX ORDER — TELMISARTAN 20 MG/1
1 TABLET ORAL
Qty: 0 | Refills: 0 | DISCHARGE

## 2023-02-14 RX ORDER — BUDESONIDE AND FORMOTEROL FUMARATE DIHYDRATE 160; 4.5 UG/1; UG/1
2 AEROSOL RESPIRATORY (INHALATION)
Refills: 0 | Status: DISCONTINUED | OUTPATIENT
Start: 2023-02-14 | End: 2023-02-16

## 2023-02-14 RX ORDER — SENNA PLUS 8.6 MG/1
2 TABLET ORAL AT BEDTIME
Refills: 0 | Status: DISCONTINUED | OUTPATIENT
Start: 2023-02-14 | End: 2023-02-16

## 2023-02-14 RX ORDER — BUTORPHANOL TARTRATE 2 MG/ML
0.12 INJECTION, SOLUTION INTRAMUSCULAR; INTRAVENOUS EVERY 6 HOURS
Refills: 0 | Status: DISCONTINUED | OUTPATIENT
Start: 2023-02-14 | End: 2023-02-16

## 2023-02-14 RX ORDER — ONDANSETRON 8 MG/1
4 TABLET, FILM COATED ORAL ONCE
Refills: 0 | Status: DISCONTINUED | OUTPATIENT
Start: 2023-02-14 | End: 2023-02-14

## 2023-02-14 RX ORDER — GABAPENTIN 400 MG/1
100 CAPSULE ORAL EVERY 8 HOURS
Refills: 0 | Status: DISCONTINUED | OUTPATIENT
Start: 2023-02-14 | End: 2023-02-16

## 2023-02-14 RX ORDER — GABAPENTIN 400 MG/1
1 CAPSULE ORAL
Qty: 0 | Refills: 0 | DISCHARGE

## 2023-02-14 RX ORDER — VERAPAMIL HCL 240 MG
1 CAPSULE, EXTENDED RELEASE PELLETS 24 HR ORAL
Qty: 0 | Refills: 0 | DISCHARGE

## 2023-02-14 RX ORDER — LEVOCETIRIZINE DIHYDROCHLORIDE 0.5 MG/ML
1 SOLUTION ORAL
Qty: 0 | Refills: 0 | DISCHARGE

## 2023-02-14 RX ORDER — CICLESONIDE 37 UG/1
2 AEROSOL, METERED NASAL
Qty: 0 | Refills: 0 | DISCHARGE

## 2023-02-14 RX ORDER — FAMOTIDINE 10 MG/ML
1 INJECTION INTRAVENOUS
Qty: 0 | Refills: 0 | DISCHARGE

## 2023-02-14 RX ORDER — SUMATRIPTAN SUCCINATE 4 MG/.5ML
1 INJECTION, SOLUTION SUBCUTANEOUS
Qty: 0 | Refills: 0 | DISCHARGE

## 2023-02-14 RX ORDER — LEVALBUTEROL 1.25 MG/.5ML
0.63 SOLUTION, CONCENTRATE RESPIRATORY (INHALATION) EVERY 8 HOURS
Refills: 0 | Status: DISCONTINUED | OUTPATIENT
Start: 2023-02-14 | End: 2023-02-16

## 2023-02-14 RX ORDER — DEXLANSOPRAZOLE 30 MG/1
1 CAPSULE, DELAYED RELEASE ORAL
Qty: 0 | Refills: 0 | DISCHARGE

## 2023-02-14 RX ORDER — POLYETHYLENE GLYCOL 3350 17 G/17G
17 POWDER, FOR SOLUTION ORAL DAILY
Refills: 0 | Status: DISCONTINUED | OUTPATIENT
Start: 2023-02-14 | End: 2023-02-16

## 2023-02-14 RX ORDER — MONTELUKAST 4 MG/1
1 TABLET, CHEWABLE ORAL
Qty: 0 | Refills: 0 | DISCHARGE

## 2023-02-14 RX ORDER — BUDESONIDE AND FORMOTEROL FUMARATE DIHYDRATE 160; 4.5 UG/1; UG/1
2 AEROSOL RESPIRATORY (INHALATION)
Qty: 0 | Refills: 0 | DISCHARGE

## 2023-02-14 RX ORDER — ONDANSETRON 8 MG/1
4 TABLET, FILM COATED ORAL EVERY 6 HOURS
Refills: 0 | Status: DISCONTINUED | OUTPATIENT
Start: 2023-02-14 | End: 2023-02-16

## 2023-02-14 RX ORDER — NALOXONE HYDROCHLORIDE 4 MG/.1ML
0.1 SPRAY NASAL
Refills: 0 | Status: DISCONTINUED | OUTPATIENT
Start: 2023-02-14 | End: 2023-02-16

## 2023-02-14 RX ORDER — MONTELUKAST 4 MG/1
10 TABLET, CHEWABLE ORAL AT BEDTIME
Refills: 0 | Status: DISCONTINUED | OUTPATIENT
Start: 2023-02-14 | End: 2023-02-16

## 2023-02-14 RX ORDER — LOSARTAN POTASSIUM 100 MG/1
50 TABLET, FILM COATED ORAL DAILY
Refills: 0 | Status: DISCONTINUED | OUTPATIENT
Start: 2023-02-14 | End: 2023-02-14

## 2023-02-14 RX ORDER — HYDROMORPHONE HYDROCHLORIDE 2 MG/ML
0.5 INJECTION INTRAMUSCULAR; INTRAVENOUS; SUBCUTANEOUS
Refills: 0 | Status: DISCONTINUED | OUTPATIENT
Start: 2023-02-14 | End: 2023-02-14

## 2023-02-14 RX ORDER — HYDROMORPHONE HYDROCHLORIDE 2 MG/ML
1 INJECTION INTRAMUSCULAR; INTRAVENOUS; SUBCUTANEOUS
Refills: 0 | Status: DISCONTINUED | OUTPATIENT
Start: 2023-02-14 | End: 2023-02-14

## 2023-02-14 RX ORDER — LUMATEPERONE 10.5 MG/1
1 CAPSULE ORAL
Qty: 0 | Refills: 0 | DISCHARGE

## 2023-02-14 RX ORDER — HEPARIN SODIUM 5000 [USP'U]/ML
5000 INJECTION INTRAVENOUS; SUBCUTANEOUS EVERY 8 HOURS
Refills: 0 | Status: DISCONTINUED | OUTPATIENT
Start: 2023-02-14 | End: 2023-02-16

## 2023-02-14 RX ORDER — FAMOTIDINE 10 MG/ML
40 INJECTION INTRAVENOUS DAILY
Refills: 0 | Status: DISCONTINUED | OUTPATIENT
Start: 2023-02-14 | End: 2023-02-14

## 2023-02-14 RX ADMIN — GABAPENTIN 100 MILLIGRAM(S): 400 CAPSULE ORAL at 23:09

## 2023-02-14 RX ADMIN — SODIUM CHLORIDE 3 MILLILITER(S): 9 INJECTION INTRAMUSCULAR; INTRAVENOUS; SUBCUTANEOUS at 13:57

## 2023-02-14 RX ADMIN — GABAPENTIN 300 MILLIGRAM(S): 400 CAPSULE ORAL at 07:17

## 2023-02-14 RX ADMIN — HEPARIN SODIUM 5000 UNIT(S): 5000 INJECTION INTRAVENOUS; SUBCUTANEOUS at 23:09

## 2023-02-14 RX ADMIN — SODIUM CHLORIDE 30 MILLILITER(S): 9 INJECTION, SOLUTION INTRAVENOUS at 14:18

## 2023-02-14 RX ADMIN — HYDROMORPHONE HYDROCHLORIDE 0.5 MILLIGRAM(S): 2 INJECTION INTRAMUSCULAR; INTRAVENOUS; SUBCUTANEOUS at 14:17

## 2023-02-14 RX ADMIN — HEPARIN SODIUM 5000 UNIT(S): 5000 INJECTION INTRAVENOUS; SUBCUTANEOUS at 16:13

## 2023-02-14 RX ADMIN — HYDROMORPHONE HYDROCHLORIDE 0.5 MILLIGRAM(S): 2 INJECTION INTRAMUSCULAR; INTRAVENOUS; SUBCUTANEOUS at 14:30

## 2023-02-14 RX ADMIN — HEPARIN SODIUM 5000 UNIT(S): 5000 INJECTION INTRAVENOUS; SUBCUTANEOUS at 07:16

## 2023-02-14 RX ADMIN — Medication 975 MILLIGRAM(S): at 07:17

## 2023-02-14 RX ADMIN — HYDROMORPHONE HYDROCHLORIDE 30 MILLILITER(S): 2 INJECTION INTRAMUSCULAR; INTRAVENOUS; SUBCUTANEOUS at 20:09

## 2023-02-14 RX ADMIN — MONTELUKAST 10 MILLIGRAM(S): 4 TABLET, CHEWABLE ORAL at 23:09

## 2023-02-14 RX ADMIN — GABAPENTIN 100 MILLIGRAM(S): 400 CAPSULE ORAL at 16:13

## 2023-02-14 RX ADMIN — HYDROMORPHONE HYDROCHLORIDE 30 MILLILITER(S): 2 INJECTION INTRAMUSCULAR; INTRAVENOUS; SUBCUTANEOUS at 14:56

## 2023-02-14 RX ADMIN — HYDROMORPHONE HYDROCHLORIDE 0.5 MILLIGRAM(S): 2 INJECTION INTRAMUSCULAR; INTRAVENOUS; SUBCUTANEOUS at 16:07

## 2023-02-14 RX ADMIN — SODIUM CHLORIDE 30 MILLILITER(S): 9 INJECTION, SOLUTION INTRAVENOUS at 07:16

## 2023-02-14 NOTE — BRIEF OPERATIVE NOTE - NSICDXBRIEFPROCEDURE_GEN_ALL_CORE_FT
PROCEDURES:  Bronchoscopy, flexible, by CT surgery 14-Feb-2023 13:20:25  Alfredo John  Robot-assisted tracheobronchoplasty 14-Feb-2023 13:22:14  Alfredo John

## 2023-02-14 NOTE — ASU PREOP CHECKLIST - SIDE RAILS UP
Healthy Habits:    * Use sunscreen    * Brush teeth twice daily, dental visit every 6 months    * 60 minutes of active play/physical activity each day     Diet:    * Eat a variety of healthy foods including fruits, vegetables, whole grains, and protein    * 2-3 servings of milk/dairy every day    * Avoid sugary foods with empty calories such as candy, pop/soda     Injury Prevention:    * Car seat safety: car safety-booster seat until 80 lbs and 57 inches    * Helmets for bike/scooter - no exceptions    * Stranger awareness     Family Interaction:    * Read daily    * Limit screen time to less than 2 hours total per day    * Social Media/Online responsibility    * Family meals     Other:    * Discipline: use enforceable statements, time-outs, avoid spanking, be consistent, praise and reward good behavior.    * Offer choices and enforce limits   
n/a

## 2023-02-15 ENCOUNTER — TRANSCRIPTION ENCOUNTER (OUTPATIENT)
Age: 58
End: 2023-02-15

## 2023-02-15 LAB
ANION GAP SERPL CALC-SCNC: 9 MMOL/L — SIGNIFICANT CHANGE UP (ref 7–14)
BUN SERPL-MCNC: 14 MG/DL — SIGNIFICANT CHANGE UP (ref 7–23)
CALCIUM SERPL-MCNC: 8.7 MG/DL — SIGNIFICANT CHANGE UP (ref 8.4–10.5)
CHLORIDE SERPL-SCNC: 101 MMOL/L — SIGNIFICANT CHANGE UP (ref 98–107)
CO2 SERPL-SCNC: 24 MMOL/L — SIGNIFICANT CHANGE UP (ref 22–31)
CREAT SERPL-MCNC: 1.02 MG/DL — SIGNIFICANT CHANGE UP (ref 0.5–1.3)
EGFR: 85 ML/MIN/1.73M2 — SIGNIFICANT CHANGE UP
GLUCOSE SERPL-MCNC: 150 MG/DL — HIGH (ref 70–99)
HCT VFR BLD CALC: 40.6 % — SIGNIFICANT CHANGE UP (ref 39–50)
HGB BLD-MCNC: 13.5 G/DL — SIGNIFICANT CHANGE UP (ref 13–17)
MAGNESIUM SERPL-MCNC: 1.9 MG/DL — SIGNIFICANT CHANGE UP (ref 1.6–2.6)
MCHC RBC-ENTMCNC: 27.6 PG — SIGNIFICANT CHANGE UP (ref 27–34)
MCHC RBC-ENTMCNC: 33.3 GM/DL — SIGNIFICANT CHANGE UP (ref 32–36)
MCV RBC AUTO: 82.9 FL — SIGNIFICANT CHANGE UP (ref 80–100)
NRBC # BLD: 0 /100 WBCS — SIGNIFICANT CHANGE UP (ref 0–0)
NRBC # FLD: 0 K/UL — SIGNIFICANT CHANGE UP (ref 0–0)
PHOSPHATE SERPL-MCNC: 3 MG/DL — SIGNIFICANT CHANGE UP (ref 2.5–4.5)
PLATELET # BLD AUTO: 159 K/UL — SIGNIFICANT CHANGE UP (ref 150–400)
POTASSIUM SERPL-MCNC: 4.3 MMOL/L — SIGNIFICANT CHANGE UP (ref 3.5–5.3)
POTASSIUM SERPL-SCNC: 4.3 MMOL/L — SIGNIFICANT CHANGE UP (ref 3.5–5.3)
RBC # BLD: 4.9 M/UL — SIGNIFICANT CHANGE UP (ref 4.2–5.8)
RBC # FLD: 18.6 % — HIGH (ref 10.3–14.5)
SODIUM SERPL-SCNC: 134 MMOL/L — LOW (ref 135–145)
WBC # BLD: 8.69 K/UL — SIGNIFICANT CHANGE UP (ref 3.8–10.5)
WBC # FLD AUTO: 8.69 K/UL — SIGNIFICANT CHANGE UP (ref 3.8–10.5)

## 2023-02-15 PROCEDURE — 71045 X-RAY EXAM CHEST 1 VIEW: CPT | Mod: 26

## 2023-02-15 PROCEDURE — 99233 SBSQ HOSP IP/OBS HIGH 50: CPT

## 2023-02-15 RX ORDER — LIDOCAINE 4 G/100G
1 CREAM TOPICAL EVERY 24 HOURS
Refills: 0 | Status: DISCONTINUED | OUTPATIENT
Start: 2023-02-15 | End: 2023-02-16

## 2023-02-15 RX ORDER — MAGNESIUM SULFATE 500 MG/ML
2 VIAL (ML) INJECTION ONCE
Refills: 0 | Status: COMPLETED | OUTPATIENT
Start: 2023-02-15 | End: 2023-02-15

## 2023-02-15 RX ORDER — OXYCODONE HYDROCHLORIDE 5 MG/1
10 TABLET ORAL EVERY 4 HOURS
Refills: 0 | Status: DISCONTINUED | OUTPATIENT
Start: 2023-02-15 | End: 2023-02-15

## 2023-02-15 RX ORDER — ACETAMINOPHEN 500 MG
1000 TABLET ORAL ONCE
Refills: 0 | Status: COMPLETED | OUTPATIENT
Start: 2023-02-15 | End: 2023-02-15

## 2023-02-15 RX ORDER — KETOROLAC TROMETHAMINE 30 MG/ML
30 SYRINGE (ML) INJECTION EVERY 6 HOURS
Refills: 0 | Status: DISCONTINUED | OUTPATIENT
Start: 2023-02-15 | End: 2023-02-16

## 2023-02-15 RX ORDER — OXYCODONE HYDROCHLORIDE 5 MG/1
5 TABLET ORAL EVERY 4 HOURS
Refills: 0 | Status: DISCONTINUED | OUTPATIENT
Start: 2023-02-15 | End: 2023-02-15

## 2023-02-15 RX ADMIN — HYDROMORPHONE HYDROCHLORIDE 30 MILLILITER(S): 2 INJECTION INTRAMUSCULAR; INTRAVENOUS; SUBCUTANEOUS at 07:25

## 2023-02-15 RX ADMIN — Medication 30 MILLIGRAM(S): at 19:15

## 2023-02-15 RX ADMIN — BUDESONIDE AND FORMOTEROL FUMARATE DIHYDRATE 2 PUFF(S): 160; 4.5 AEROSOL RESPIRATORY (INHALATION) at 22:54

## 2023-02-15 RX ADMIN — HEPARIN SODIUM 5000 UNIT(S): 5000 INJECTION INTRAVENOUS; SUBCUTANEOUS at 07:57

## 2023-02-15 RX ADMIN — HYDROMORPHONE HYDROCHLORIDE 30 MILLILITER(S): 2 INJECTION INTRAMUSCULAR; INTRAVENOUS; SUBCUTANEOUS at 09:26

## 2023-02-15 RX ADMIN — GABAPENTIN 100 MILLIGRAM(S): 400 CAPSULE ORAL at 07:57

## 2023-02-15 RX ADMIN — LEVALBUTEROL 0.63 MILLIGRAM(S): 1.25 SOLUTION, CONCENTRATE RESPIRATORY (INHALATION) at 14:50

## 2023-02-15 RX ADMIN — Medication 400 MILLIGRAM(S): at 08:00

## 2023-02-15 RX ADMIN — POLYETHYLENE GLYCOL 3350 17 GRAM(S): 17 POWDER, FOR SOLUTION ORAL at 13:21

## 2023-02-15 RX ADMIN — PANTOPRAZOLE SODIUM 40 MILLIGRAM(S): 20 TABLET, DELAYED RELEASE ORAL at 05:15

## 2023-02-15 RX ADMIN — MONTELUKAST 10 MILLIGRAM(S): 4 TABLET, CHEWABLE ORAL at 22:55

## 2023-02-15 RX ADMIN — Medication 1000 MILLIGRAM(S): at 08:30

## 2023-02-15 RX ADMIN — Medication 1000 MILLIGRAM(S): at 23:30

## 2023-02-15 RX ADMIN — LEVALBUTEROL 0.63 MILLIGRAM(S): 1.25 SOLUTION, CONCENTRATE RESPIRATORY (INHALATION) at 20:45

## 2023-02-15 RX ADMIN — HEPARIN SODIUM 5000 UNIT(S): 5000 INJECTION INTRAVENOUS; SUBCUTANEOUS at 19:14

## 2023-02-15 RX ADMIN — HYDROMORPHONE HYDROCHLORIDE 30 MILLILITER(S): 2 INJECTION INTRAMUSCULAR; INTRAVENOUS; SUBCUTANEOUS at 10:12

## 2023-02-15 RX ADMIN — Medication 30 MILLIGRAM(S): at 13:35

## 2023-02-15 RX ADMIN — GABAPENTIN 100 MILLIGRAM(S): 400 CAPSULE ORAL at 19:15

## 2023-02-15 RX ADMIN — HYDROMORPHONE HYDROCHLORIDE 30 MILLILITER(S): 2 INJECTION INTRAMUSCULAR; INTRAVENOUS; SUBCUTANEOUS at 19:19

## 2023-02-15 RX ADMIN — SODIUM CHLORIDE 30 MILLILITER(S): 9 INJECTION, SOLUTION INTRAVENOUS at 22:56

## 2023-02-15 RX ADMIN — SODIUM CHLORIDE 30 MILLILITER(S): 9 INJECTION, SOLUTION INTRAVENOUS at 07:57

## 2023-02-15 RX ADMIN — HYDROMORPHONE HYDROCHLORIDE 30 MILLILITER(S): 2 INJECTION INTRAMUSCULAR; INTRAVENOUS; SUBCUTANEOUS at 09:08

## 2023-02-15 RX ADMIN — LEVALBUTEROL 0.63 MILLIGRAM(S): 1.25 SOLUTION, CONCENTRATE RESPIRATORY (INHALATION) at 00:33

## 2023-02-15 RX ADMIN — Medication 25 GRAM(S): at 05:15

## 2023-02-15 RX ADMIN — Medication 30 MILLIGRAM(S): at 13:20

## 2023-02-15 RX ADMIN — BUDESONIDE AND FORMOTEROL FUMARATE DIHYDRATE 2 PUFF(S): 160; 4.5 AEROSOL RESPIRATORY (INHALATION) at 10:26

## 2023-02-15 RX ADMIN — TIOTROPIUM BROMIDE 2 PUFF(S): 18 CAPSULE ORAL; RESPIRATORY (INHALATION) at 13:20

## 2023-02-15 RX ADMIN — LIDOCAINE 1 PATCH: 4 CREAM TOPICAL at 13:21

## 2023-02-15 RX ADMIN — Medication 400 MILLIGRAM(S): at 22:53

## 2023-02-15 RX ADMIN — LIDOCAINE 1 PATCH: 4 CREAM TOPICAL at 19:48

## 2023-02-15 NOTE — DISCHARGE NOTE PROVIDER - NSDCMRMEDTOKEN_GEN_ALL_CORE_FT
Asmanex  mcg/inh inhalation aerosol: 1 puff(s) inhaled 2 times a day  Caplyta 10.5 mg oral capsule: 1 cap(s) orally once a day pm  Dexilant 60 mg oral delayed release capsule: 1 cap(s) orally once a day AM  diazePAM 10 mg oral tablet: 2 tab(s) orally 2 times a day, As Needed  famotidine 40 mg oral tablet: 1 tab(s) orally once a day (at bedtime)  gabapentin 100 mg oral capsule: 1 cap(s) orally 3 times a day  Imitrex 25 mg oral tablet: 1 tab(s) orally once, As Needed  ipratropium nasal: 2 spray(s) nasal 2 times a day  medical marijuana: 1 drop(s) orally once a day (at bedtime), As Needed  Omnaris 50 mcg/inh nasal spray: 2 spray(s) nasal 2 times a day  Singulair 10 mg oral tablet: 1 tab(s) orally once a day PM  Spiriva Respimat 10 ACT 2.5 mcg/inh inhalation aerosol: 2 puff(s) inhaled 2 times a day  Symbicort 160 mcg-4.5 mcg/inh inhalation aerosol: 2 puff(s) inhaled 2 times a day  telmisartan 40 mg oral tablet: 1 tab(s) orally once a day AM  verapamil 200 mg/24 hours oral capsule, extended release: 1 cap(s) orally once a day (at bedtime)  Xopenex HFA 45 mcg/inh inhalation aerosol: 2 puff(s) inhaled 3 times a day, As Needed  Xyzal 5 mg oral tablet: 1 tab(s) orally once a day (in the evening)   Asmanex  mcg/inh inhalation aerosol: 1 puff(s) inhaled 2 times a day  Caplyta 10.5 mg oral capsule: 1 cap(s) orally once a day pm  Dexilant 60 mg oral delayed release capsule: 1 cap(s) orally once a day AM  diazePAM 10 mg oral tablet: 2 tab(s) orally 2 times a day, As Needed  diphenhydrAMINE 25 mg oral capsule: 1 cap(s) orally every 6 hours, As needed, Rash and/or Itching  famotidine 40 mg oral tablet: 1 tab(s) orally once a day (at bedtime)  gabapentin 100 mg oral capsule: 1 cap(s) orally 3 times a day  Imitrex 25 mg oral tablet: 1 tab(s) orally once, As Needed  ipratropium nasal: 2 spray(s) nasal 2 times a day  medical marijuana: 1 drop(s) orally once a day (at bedtime), As Needed  Narcan 4 mg/0.1 mL nasal spray: 4 milligram(s) intranasally once, As Needed   Omnaris 50 mcg/inh nasal spray: 2 spray(s) nasal 2 times a day  oxyCODONE 10 mg oral tablet: 1 tab(s) orally every 3 hours, As needed, moderate to severe pain MDD:8 **can run without insurance if not covered**  polyethylene glycol 3350 oral powder for reconstitution: 17 gram(s) orally once a day  senna leaf extract oral tablet: 2 tab(s) orally once a day (at bedtime)  Singulair 10 mg oral tablet: 1 tab(s) orally once a day PM  Spiriva Respimat 10 ACT 2.5 mcg/inh inhalation aerosol: 2 puff(s) inhaled 2 times a day  Symbicort 160 mcg-4.5 mcg/inh inhalation aerosol: 2 puff(s) inhaled 2 times a day  telmisartan 40 mg oral tablet: 1 tab(s) orally once a day AM  Tylenol 325 mg oral tablet: 2 tab(s) orally every 6 hours, As Needed  verapamil 200 mg/24 hours oral capsule, extended release: 1 cap(s) orally once a day (at bedtime)  Xopenex HFA 45 mcg/inh inhalation aerosol: 2 puff(s) inhaled 3 times a day, As Needed  Xyzal 5 mg oral tablet: 1 tab(s) orally once a day (in the evening)

## 2023-02-15 NOTE — DISCHARGE NOTE PROVIDER - NSDCCPCAREPLAN_GEN_ALL_CORE_FT
PRINCIPAL DISCHARGE DIAGNOSIS  Diagnosis: Other specified diseases of upper respiratory tract  Assessment and Plan of Treatment:

## 2023-02-15 NOTE — DISCHARGE NOTE PROVIDER - NSDCFUADDAPPT_GEN_ALL_CORE_FT
Follow up with Dr. Stubbs in 1-2 weeks [phone]   Chest x-ray prior to appointment with Dr. Stubbs   Follow up with Dr. Stubbs in 1-2 weeks [phone]   Chest x-ray prior to appointment with Dr. Stubbs  Please see your PCP as needed.

## 2023-02-15 NOTE — DISCHARGE NOTE PROVIDER - CARE PROVIDER_API CALL
Alvin Stubbs (MD)  Surgery; Thoracic Surgery  [phone] [place] [address] [place] [address]  Phone: [phone]  Fax: [phone]  Follow Up Time:

## 2023-02-15 NOTE — DISCHARGE NOTE PROVIDER - NSDCFUADDINST_GEN_ALL_CORE_FT
Please walk 4-5 x per day; increase as tolerated. You may climb stairs. Continue to use the incentive spirometer.   You may keep wounds uncovered. Please shower daily with soap and water. The suture will be removed in the office at the follow up appointment.   Please call the office at [phone] if you have fevers, chills, worsening shortness of breath, chest pain, warmth, redness or purulent discharge from the wound.  Please walk 4-5 x per day; increase as tolerated. You may climb stairs. Continue to use the incentive spirometer.   You may keep wounds uncovered starting tomorrow and begin to shower then. Please shower daily with soap and water. The suture will be removed in the office at the follow up appointment.   Please call the office at [phone] if you have fevers, chills, worsening shortness of breath, chest pain, warmth, redness or purulent discharge from the wound.

## 2023-02-15 NOTE — DISCHARGE NOTE PROVIDER - NSDCACTIVITY_GEN_ALL_CORE
Do not drive or operate machinery/Showering allowed/Do not make important decisions/Stairs allowed/Walking - Indoors allowed/No heavy lifting/straining/Walking - Outdoors allowed/Follow Instructions Provided by your Surgical Team Sex allowed/Do not drive or operate machinery/Showering allowed/Do not make important decisions/Stairs allowed/Walking - Indoors allowed/No heavy lifting/straining/Walking - Outdoors allowed/Follow Instructions Provided by your Surgical Team

## 2023-02-15 NOTE — DISCHARGE NOTE PROVIDER - HOSPITAL COURSE
58 y/o male who was a 9/11 [facility]  s/p awake bronchoscopy on 1/12/23 which noted 70% collapse of distal trachea and 70% collapse of the right mainstem bronchus. Pt reports chronic shortness of breath that has gotten progressively worse. Patient s/p flex bronch, robotic assisted right Vats, tracheobronchoplasty on 2/14/23.  Post op course without complication.  Patient stable for discharge home when chest tube removed.    58 y/o male who was a 9/11 [facility]  s/p awake bronchoscopy on 1/12/23 which noted 70% collapse of distal trachea and 70% collapse of the right mainstem bronchus. Pt reports chronic shortness of breath that has gotten progressively worse. Patient s/p flex bronch, robotic assisted right Vats, tracheobronchoplasty on 2/14/23.  Post op course without complication.  Initially, pt had a small air leak. That resolved today and CT was removed. Post removal CXR stable, no PTX seen. Pt followed by pain management for severe pain at VATS site. Pt recommended for Oxy 10mg Q3h prn at home in addition to his preop meds. Pt feels well. OOB and amb  ad clarissa in room and hallway. ON RA. Lungs CTA. VATS c/d/i. Pt cleared for dc to home by Dr. Stubbs  Vital Signs Last 24 Hrs  T(C): 36.6 (16 Feb 2023 12:01), Max: 36.9 (16 Feb 2023 05:05)  T(F): 97.8 (16 Feb 2023 12:01), Max: 98.4 (16 Feb 2023 05:05)  HR: 100 (16 Feb 2023 12:01) (69 - 106)  BP: 121/80 (16 Feb 2023 12:01) (96/62 - 124/70)  BP(mean): --  RR: 18 (16 Feb 2023 12:01) (16 - 18)  SpO2: 98% (16 Feb 2023 12:01) (95% - 100%)    Parameters below as of 16 Feb 2023 12:01  Patient On (Oxygen Delivery Method): room air

## 2023-02-16 ENCOUNTER — TRANSCRIPTION ENCOUNTER (OUTPATIENT)
Age: 58
End: 2023-02-16

## 2023-02-16 VITALS
TEMPERATURE: 98 F | DIASTOLIC BLOOD PRESSURE: 80 MMHG | SYSTOLIC BLOOD PRESSURE: 121 MMHG | OXYGEN SATURATION: 98 % | HEART RATE: 100 BPM | RESPIRATION RATE: 18 BRPM

## 2023-02-16 LAB
ANION GAP SERPL CALC-SCNC: 9 MMOL/L — SIGNIFICANT CHANGE UP (ref 7–14)
BUN SERPL-MCNC: 18 MG/DL — SIGNIFICANT CHANGE UP (ref 7–23)
CALCIUM SERPL-MCNC: 8.4 MG/DL — SIGNIFICANT CHANGE UP (ref 8.4–10.5)
CHLORIDE SERPL-SCNC: 103 MMOL/L — SIGNIFICANT CHANGE UP (ref 98–107)
CO2 SERPL-SCNC: 26 MMOL/L — SIGNIFICANT CHANGE UP (ref 22–31)
CREAT SERPL-MCNC: 1.11 MG/DL — SIGNIFICANT CHANGE UP (ref 0.5–1.3)
EGFR: 77 ML/MIN/1.73M2 — SIGNIFICANT CHANGE UP
GLUCOSE SERPL-MCNC: 101 MG/DL — HIGH (ref 70–99)
HCT VFR BLD CALC: 39.9 % — SIGNIFICANT CHANGE UP (ref 39–50)
HGB BLD-MCNC: 12.6 G/DL — LOW (ref 13–17)
MAGNESIUM SERPL-MCNC: 2 MG/DL — SIGNIFICANT CHANGE UP (ref 1.6–2.6)
MCHC RBC-ENTMCNC: 26.9 PG — LOW (ref 27–34)
MCHC RBC-ENTMCNC: 31.6 GM/DL — LOW (ref 32–36)
MCV RBC AUTO: 85.3 FL — SIGNIFICANT CHANGE UP (ref 80–100)
NRBC # BLD: 0 /100 WBCS — SIGNIFICANT CHANGE UP (ref 0–0)
NRBC # FLD: 0 K/UL — SIGNIFICANT CHANGE UP (ref 0–0)
PHOSPHATE SERPL-MCNC: 3.8 MG/DL — SIGNIFICANT CHANGE UP (ref 2.5–4.5)
PLATELET # BLD AUTO: 143 K/UL — LOW (ref 150–400)
POTASSIUM SERPL-MCNC: 4.2 MMOL/L — SIGNIFICANT CHANGE UP (ref 3.5–5.3)
POTASSIUM SERPL-SCNC: 4.2 MMOL/L — SIGNIFICANT CHANGE UP (ref 3.5–5.3)
RBC # BLD: 4.68 M/UL — SIGNIFICANT CHANGE UP (ref 4.2–5.8)
RBC # FLD: 19.1 % — HIGH (ref 10.3–14.5)
SODIUM SERPL-SCNC: 138 MMOL/L — SIGNIFICANT CHANGE UP (ref 135–145)
WBC # BLD: 4.91 K/UL — SIGNIFICANT CHANGE UP (ref 3.8–10.5)
WBC # FLD AUTO: 4.91 K/UL — SIGNIFICANT CHANGE UP (ref 3.8–10.5)

## 2023-02-16 PROCEDURE — 71045 X-RAY EXAM CHEST 1 VIEW: CPT | Mod: 26,76

## 2023-02-16 RX ORDER — OXYCODONE HYDROCHLORIDE 5 MG/1
10 TABLET ORAL
Refills: 0 | Status: DISCONTINUED | OUTPATIENT
Start: 2023-02-16 | End: 2023-02-16

## 2023-02-16 RX ORDER — POLYETHYLENE GLYCOL 3350 17 G/17G
17 POWDER, FOR SOLUTION ORAL
Qty: 0 | Refills: 0 | DISCHARGE
Start: 2023-02-16

## 2023-02-16 RX ORDER — DIPHENHYDRAMINE HCL 50 MG
25 CAPSULE ORAL EVERY 6 HOURS
Refills: 0 | Status: DISCONTINUED | OUTPATIENT
Start: 2023-02-16 | End: 2023-02-16

## 2023-02-16 RX ORDER — DIPHENHYDRAMINE HCL 50 MG
1 CAPSULE ORAL
Qty: 0 | Refills: 0 | DISCHARGE
Start: 2023-02-16

## 2023-02-16 RX ORDER — OXYCODONE HYDROCHLORIDE 5 MG/1
1 TABLET ORAL
Qty: 40 | Refills: 0
Start: 2023-02-16 | End: 2023-02-20

## 2023-02-16 RX ORDER — LUMATEPERONE 10.5 MG/1
42 CAPSULE ORAL
Refills: 0 | Status: DISCONTINUED | OUTPATIENT
Start: 2023-02-16 | End: 2023-02-16

## 2023-02-16 RX ORDER — ACETAMINOPHEN 500 MG
2 TABLET ORAL
Qty: 0 | Refills: 0 | DISCHARGE

## 2023-02-16 RX ORDER — NALOXONE HYDROCHLORIDE 4 MG/.1ML
4 SPRAY NASAL
Qty: 1 | Refills: 0
Start: 2023-02-16

## 2023-02-16 RX ORDER — SENNA PLUS 8.6 MG/1
2 TABLET ORAL
Qty: 0 | Refills: 0 | DISCHARGE
Start: 2023-02-16

## 2023-02-16 RX ORDER — IBUPROFEN 200 MG
2 TABLET ORAL
Qty: 0 | Refills: 0 | DISCHARGE

## 2023-02-16 RX ADMIN — Medication 30 MILLIGRAM(S): at 11:30

## 2023-02-16 RX ADMIN — Medication 30 MILLIGRAM(S): at 11:14

## 2023-02-16 RX ADMIN — GABAPENTIN 100 MILLIGRAM(S): 400 CAPSULE ORAL at 01:05

## 2023-02-16 RX ADMIN — PANTOPRAZOLE SODIUM 40 MILLIGRAM(S): 20 TABLET, DELAYED RELEASE ORAL at 06:58

## 2023-02-16 RX ADMIN — LIDOCAINE 1 PATCH: 4 CREAM TOPICAL at 00:00

## 2023-02-16 RX ADMIN — Medication 30 MILLIGRAM(S): at 07:02

## 2023-02-16 RX ADMIN — OXYCODONE HYDROCHLORIDE 10 MILLIGRAM(S): 5 TABLET ORAL at 12:36

## 2023-02-16 RX ADMIN — Medication 30 MILLIGRAM(S): at 01:30

## 2023-02-16 RX ADMIN — TIOTROPIUM BROMIDE 2 PUFF(S): 18 CAPSULE ORAL; RESPIRATORY (INHALATION) at 11:04

## 2023-02-16 RX ADMIN — OXYCODONE HYDROCHLORIDE 10 MILLIGRAM(S): 5 TABLET ORAL at 13:22

## 2023-02-16 RX ADMIN — HYDROMORPHONE HYDROCHLORIDE 30 MILLILITER(S): 2 INJECTION INTRAMUSCULAR; INTRAVENOUS; SUBCUTANEOUS at 07:18

## 2023-02-16 RX ADMIN — HEPARIN SODIUM 5000 UNIT(S): 5000 INJECTION INTRAVENOUS; SUBCUTANEOUS at 06:57

## 2023-02-16 RX ADMIN — Medication 25 MILLIGRAM(S): at 11:04

## 2023-02-16 RX ADMIN — BUDESONIDE AND FORMOTEROL FUMARATE DIHYDRATE 2 PUFF(S): 160; 4.5 AEROSOL RESPIRATORY (INHALATION) at 08:31

## 2023-02-16 RX ADMIN — GABAPENTIN 100 MILLIGRAM(S): 400 CAPSULE ORAL at 08:31

## 2023-02-16 RX ADMIN — Medication 30 MILLIGRAM(S): at 01:04

## 2023-02-16 RX ADMIN — LEVALBUTEROL 0.63 MILLIGRAM(S): 1.25 SOLUTION, CONCENTRATE RESPIRATORY (INHALATION) at 07:13

## 2023-02-16 NOTE — PROGRESS NOTE ADULT - SUBJECTIVE AND OBJECTIVE BOX
ABHIJIT PETERS                     [id number]    HPI:  56 y/o male who was a 9/11 [facility]  presents to PST preop for robotic right video assisted thoracoscopy, tracheobronchoplasty surgery. Pt s/p awake bronchoscopy on 1/12/23 which noted 70% collapse of distal trachea and 70% collapse of the right mainstem bronchus. Pt reports chronic shortness of breath that has gotten progressively worse.     CHIEF COMPLAINT: FOLLOW UP IN ICU FOR POSTOPERATIVE CARE OF PATIENT WHO IS S/P LUNG RESECTION      PROCEDURES: Robot-assisted tracheobronchoplasty 14-Feb-2023       ISSUES:   Tracheobronchomalacia  Post op pain  Chest tube in place  Asthma  Anxiety  CKD  GERD  HTN  Hx of obesity s/p gastric banding (2007 and documented refused to deflate lap band 12/11/2019)  PTSD  Cervical radiculopathy      PAST MEDICAL & SURGICAL HISTORY:  Asthma  no previous intubation for asthma, reactive airway      Lung nodule  bilateral      PTSD (post-traumatic stress disorder)  [employer] survivor      HTN (hypertension)      History of obesity  s/p gastric banding 2007 (Pt refuses to deflate lap band for surgery 12/11/19      Insomnia      Other spondylosis with radiculopathy, cervical region      Disc displacement, lumbar      Anxiety      Gastroesophageal reflux disease with hiatal hernia      Abnormal chest CT      GERD (gastroesophageal reflux disease)      Dysphonia      Anemia      Bronchomalacia      History of tracheomalacia      Other specified diseases of upper respiratory tract      H/O carpal tunnel repair  bilateral w/ ulnar nerve intratment      History of gastric surgery  1/2002      Gastric banding status  2008      History of colonoscopy  5 years ago      History of cholecystectomy      S/P LASIK surgery      H/O laminectomy  7/2019 and fusion, ID lumbar 8/2019      S/P cervical spinal fusion      History of right hip replacement      H/O decompression of ulnar nerve      H/O abdominoplasty      H/O nasal septoplasty      S/P bronchoscopy                VITAL SIGNS:  Vital Signs Last 24 Hrs  T(C): 36.8 (15 Feb 2023 09:34), Max: 36.8 (15 Feb 2023 09:34)  T(F): 98.2 (15 Feb 2023 09:34), Max: 98.2 (15 Feb 2023 09:34)  HR: 84 (15 Feb 2023 09:34) (58 - 92)  BP: 114/77 (15 Feb 2023 09:34) (104/64 - 128/66)  BP(mean): 79 (14 Feb 2023 20:00) (71 - 83)  RR: 18 (15 Feb 2023 09:34) (10 - 22)  SpO2: 95% (15 Feb 2023 09:34) (93% - 100%)    Parameters below as of 15 Feb 2023 09:34  Patient On (Oxygen Delivery Method): room air        I/Os:   I&O's Detail    14 Feb 2023 07:01  -  15 Feb 2023 07:00  --------------------------------------------------------  IN:    IV PiggyBack: 50 mL    Lactated Ringers: 360 mL    Oral Fluid: 720 mL  Total IN: 1130 mL    OUT:    Chest Tube (mL): 35 mL    Voided (mL): 1400 mL  Total OUT: 1435 mL    Total NET: -305 mL      15 Feb 2023 07:01  -  15 Feb 2023 11:46  --------------------------------------------------------  IN:    IV PiggyBack: 100 mL    Lactated Ringers: 90 mL  Total IN: 190 mL    OUT:    Voided (mL): 300 mL  Total OUT: 300 mL    Total NET: -110 mL          CAPILLARY BLOOD GLUCOSE          =======================MEDICATIONS===================  MEDICATIONS  (STANDING):  acetaminophen   IVPB .. 1000 milliGRAM(s) IV Intermittent once  budesonide 160 MICROgram(s)/formoterol 4.5 MICROgram(s) Inhaler 2 Puff(s) Inhalation two times a day  gabapentin 100 milliGRAM(s) Oral every 8 hours  heparin   Injectable 5000 Unit(s) SubCutaneous every 8 hours  HYDROmorphone PCA (1 mG/mL) 30 milliLiter(s) PCA Continuous PCA Continuous  lactated ringers. 1000 milliLiter(s) (30 mL/Hr) IV Continuous <Continuous>  levalbuterol Inhalation 0.63 milliGRAM(s) Inhalation every 8 hours  lidocaine   4% Patch 1 Patch Transdermal every 24 hours  montelukast 10 milliGRAM(s) Oral at bedtime  pantoprazole    Tablet 40 milliGRAM(s) Oral before breakfast  polyethylene glycol 3350 17 Gram(s) Oral daily  senna 2 Tablet(s) Oral at bedtime  tiotropium 2.5 MICROgram(s) Inhaler 2 Puff(s) Inhalation daily    MEDICATIONS  (PRN):  butorphanol Injectable 0.125 milliGRAM(s) IV Push every 6 hours PRN Pruritus  diphenhydrAMINE Injectable 50 milliGRAM(s) IV Push every 4 hours PRN Pruritus  HYDROmorphone PCA (1 mG/mL) Rescue Clinician Bolus 0.5 milliGRAM(s) IV Push every 15 minutes PRN for Pain Scale GREATER THAN 6  naloxone Injectable 0.1 milliGRAM(s) IV Push every 3 minutes PRN For ANY of the following changes in patient status:  A. RR LESS THAN 10 breaths per minute, B. Oxygen saturation LESS THAN 90%, C. Sedation score of 6  ondansetron Injectable 4 milliGRAM(s) IV Push every 6 hours PRN Nausea      PHYSICAL EXAM============================  General:                         Awake, alert, not in any distress  Neuro:                            Moving all extremities to commands.   Respiratory:	Air entry fair and  bilateral conducted sounds                                           Effort even and unlabored.  CV:		Regular rate and rhythm. Normal S1/S2                                          Distal pulses present.  Abdomen:	                     Soft, non-distended. Bowel sounds present   Skin:		No rash.  Extremities:	Warm, no cyanosis or edema.  Palpable pulses    ============================LABS=========================                        13.5   8.69  )-----------( 159      ( 15 Feb 2023 03:05 )             40.6     02-15    134<L>  |  101  |  14  ----------------------------<  150<H>  4.3   |  24  |  1.02    Ca    8.7      15 Feb 2023 03:05  Phos  3.0     02-15  Mg     1.90     02-15      ASSESSMENT AND PLAN:     NEURO:  Post-operative Pain - Stable. Pain control with PCA and Tylenol IV PRN.     Anxiety disorder - stable. continue psych meds.           RESPIRATORY:  Hypoxia - Wean nasal cannula for goal O2sat above 92. Obtain CXR. Incentive spirometry. Chest PT and frequent suctioning. Continue bronchodilators. OOB to chair & ambulate w/ assistance. Continuous pulse oximetry for support & to prevent decompensation.    Chest tube – Pleurevac regulated suctioning. Monitor chest tube output.     Asthma - worsened by thoracic surgery. Continue bronchodilators.     Tracheobronchomalacia - Improved s/p tracheobronchoplasty. Monitor repeat CXR.       CARDIOVASCULAR:  Hemodynamically stable - Not on pressors. Continue hemodynamic monitoring.  Telemetry (medical test) - Reviewed by me today independently. Normal sinus rhythm.  HTN - stable. Hold home antihypertensives for now.       RENAL:  CKD – Stable. Renally dose medications. Monitor for hyperkalemia and uremia. Avoid nephrotoxic medications. Monitor IOs and electrolytes.    GASTROINTESTINAL:  GI prophylaxis not indicated  Zofran and Reglan IV PRN for nausea  Regular consistency diet  GERD - stable. Continue pantoprazole        HEMATOLOGIC:  No signs of active bleeding. Monitor Hgb in CBC in AM  DVT prophylaxis with heparin subQ and SCDs.           INFECTIOUS DISEASE:  All surgical sites appear clean. No signs of active infection. Will monitor for fever and leukocytosis.       ENDOCRINE:  Stable – Monitor glucose fingersticks for goal 120-180.       Pertinent clinical, laboratory, radiographic, hemodynamic, echocardiographic, respiratory data, microbiologic data and chart were reviewed by myself and analyzed frequently throughout the course of the day and night by myself.    Plan discussed at length with the CTICU staff and Attending CT Surgeon -   Dr Alvin Stubbs.      Patient's status was discussed with patient at bedside.     	    Matias Howell DO, FACEP    
Anesthesia Pain Management Service    SUBJECTIVE: Patient is doing well with IV PCA and no significant problems reported.    Pain Scale Score	At rest: _3/10__ 	With Activity: 7/10	[X ] Refer to charted pain scores    THERAPY:    [ ] IV PCA Morphine		[ ] 5 mg/mL	[ ] 1 mg/mL  [X ] IV PCA Hydromorphone	[ ] 5 mg/mL	[X ] 1 mg/mL  [ ] IV PCA Fentanyl		[ ] 50 micrograms/mL    Demand dose __0.2_ lockout __6_ (minutes) Continuous Rate _0__ Total: _8.95__   mg used (in past 24 hrs)      MEDICATIONS  (STANDING):  acetaminophen   IVPB .. 1000 milliGRAM(s) IV Intermittent once  budesonide 160 MICROgram(s)/formoterol 4.5 MICROgram(s) Inhaler 2 Puff(s) Inhalation two times a day  gabapentin 100 milliGRAM(s) Oral every 8 hours  heparin   Injectable 5000 Unit(s) SubCutaneous every 8 hours  ketorolac   Injectable 30 milliGRAM(s) IV Push every 6 hours  levalbuterol Inhalation 0.63 milliGRAM(s) Inhalation every 8 hours  lidocaine   4% Patch 1 Patch Transdermal every 24 hours  lumateperone tosylate 42 milliGRAM(s) Oral with dinner  montelukast 10 milliGRAM(s) Oral at bedtime  pantoprazole    Tablet 40 milliGRAM(s) Oral before breakfast  polyethylene glycol 3350 17 Gram(s) Oral daily  senna 2 Tablet(s) Oral at bedtime  tiotropium 2.5 MICROgram(s) Inhaler 2 Puff(s) Inhalation daily    MEDICATIONS  (PRN):  naloxone Injectable 0.1 milliGRAM(s) IV Push every 3 minutes PRN For ANY of the following changes in patient status:  A. RR LESS THAN 10 breaths per minute, B. Oxygen saturation LESS THAN 90%, C. Sedation score of 6  ondansetron Injectable 4 milliGRAM(s) IV Push every 6 hours PRN Nausea  oxyCODONE    IR 10 milliGRAM(s) Oral every 3 hours PRN Moderate to Severe Pain (4 - 10)       OBJECTIVE: Patient sitting up in bed.    Sedation Score:	[ X] Alert	[ ] Drowsy 	[ ] Arousable	[ ] Asleep	[ ] Unresponsive    Side Effects:	[X ] None	[ ] Nausea	[ ] Vomiting	[ ] Pruritus  		[ ] Other:    Vital Signs Last 24 Hrs  T(C): 36.9 (16 Feb 2023 08:11), Max: 36.9 (16 Feb 2023 05:05)  T(F): 98.4 (16 Feb 2023 08:11), Max: 98.4 (16 Feb 2023 05:05)  HR: 86 (16 Feb 2023 08:11) (69 - 106)  BP: 116/77 (16 Feb 2023 08:11) (96/62 - 124/70)  BP(mean): --  RR: 16 (16 Feb 2023 08:11) (16 - 18)  SpO2: 100% (16 Feb 2023 08:11) (95% - 100%)    Parameters below as of 16 Feb 2023 08:11  Patient On (Oxygen Delivery Method): room air        ASSESSMENT/ PLAN    Therapy to  be:	[ ] Continue   [ X] Discontinued   [X ] Change to prn Analgesics    Documentation and Verification of current medications:   [X] Done	[ ] Not done, not elligible    Comments:  IV PCA discontinued. PRN Oral/IV opioids and/or Adjuvant non-opioid medication to be ordered at this point.    Progress Note written now but Patient was seen earlier.
Anesthesia Pain Management Service- Attending Addendum    SUBJECTIVE: Patient's pain control adequate    Therapy:	  [ X] IV PCA	   [ ] Epidural           [ ] s/p Spinal Opoid              [ ] Postpartum infusion	  [ ] Patient controlled regional anesthesia (PCRA)    [ ] prn Analgesics    Allergies    trazodone (Unknown)    Intolerances      MEDICATIONS  (STANDING):  acetaminophen   IVPB .. 1000 milliGRAM(s) IV Intermittent once  budesonide 160 MICROgram(s)/formoterol 4.5 MICROgram(s) Inhaler 2 Puff(s) Inhalation two times a day  gabapentin 100 milliGRAM(s) Oral every 8 hours  heparin   Injectable 5000 Unit(s) SubCutaneous every 8 hours  ketorolac   Injectable 30 milliGRAM(s) IV Push every 6 hours  levalbuterol Inhalation 0.63 milliGRAM(s) Inhalation every 8 hours  lidocaine   4% Patch 1 Patch Transdermal every 24 hours  lumateperone tosylate 42 milliGRAM(s) Oral with dinner  montelukast 10 milliGRAM(s) Oral at bedtime  pantoprazole    Tablet 40 milliGRAM(s) Oral before breakfast  polyethylene glycol 3350 17 Gram(s) Oral daily  senna 2 Tablet(s) Oral at bedtime  tiotropium 2.5 MICROgram(s) Inhaler 2 Puff(s) Inhalation daily    MEDICATIONS  (PRN):  diphenhydrAMINE 25 milliGRAM(s) Oral every 6 hours PRN Rash and/or Itching  naloxone Injectable 0.1 milliGRAM(s) IV Push every 3 minutes PRN For ANY of the following changes in patient status:  A. RR LESS THAN 10 breaths per minute, B. Oxygen saturation LESS THAN 90%, C. Sedation score of 6  ondansetron Injectable 4 milliGRAM(s) IV Push every 6 hours PRN Nausea  oxyCODONE    IR 10 milliGRAM(s) Oral every 3 hours PRN moderate to severe pain      OBJECTIVE:   [X] No new signs     [ ] Other:    Side Effects:  [X ] None			[ ] Other:      ASSESSMENT/PLAN  -Discontinue current therapy    [ ] Therapy changed to:    [ ] IV PCA       [ ] Epidural     [ X] prn Analgesics     Comments: Pain management per primary team, APS to sign off
Anesthesia Pain Management Service- Attending Addendum    SUBJECTIVE: Pt doing well with IV PCA without problems reported.    Therapy:	  [ X] IV PCA	   [ ] Epidural           [ ] s/p Spinal Opoid              [ ] Postpartum infusion	  [ ] Patient controlled regional anesthesia (PCRA)    [ ] prn Analgesics    Allergies    trazodone (Unknown)    Intolerances      MEDICATIONS  (STANDING):  acetaminophen   IVPB .. 1000 milliGRAM(s) IV Intermittent once  budesonide 160 MICROgram(s)/formoterol 4.5 MICROgram(s) Inhaler 2 Puff(s) Inhalation two times a day  gabapentin 100 milliGRAM(s) Oral every 8 hours  heparin   Injectable 5000 Unit(s) SubCutaneous every 8 hours  HYDROmorphone PCA (1 mG/mL) 30 milliLiter(s) PCA Continuous PCA Continuous  ketorolac   Injectable 30 milliGRAM(s) IV Push every 6 hours  lactated ringers. 1000 milliLiter(s) (30 mL/Hr) IV Continuous <Continuous>  levalbuterol Inhalation 0.63 milliGRAM(s) Inhalation every 8 hours  lidocaine   4% Patch 1 Patch Transdermal every 24 hours  montelukast 10 milliGRAM(s) Oral at bedtime  pantoprazole    Tablet 40 milliGRAM(s) Oral before breakfast  polyethylene glycol 3350 17 Gram(s) Oral daily  senna 2 Tablet(s) Oral at bedtime  tiotropium 2.5 MICROgram(s) Inhaler 2 Puff(s) Inhalation daily    MEDICATIONS  (PRN):  butorphanol Injectable 0.125 milliGRAM(s) IV Push every 6 hours PRN Pruritus  diphenhydrAMINE Injectable 50 milliGRAM(s) IV Push every 4 hours PRN Pruritus  HYDROmorphone PCA (1 mG/mL) Rescue Clinician Bolus 0.5 milliGRAM(s) IV Push every 15 minutes PRN for Pain Scale GREATER THAN 6  naloxone Injectable 0.1 milliGRAM(s) IV Push every 3 minutes PRN For ANY of the following changes in patient status:  A. RR LESS THAN 10 breaths per minute, B. Oxygen saturation LESS THAN 90%, C. Sedation score of 6  ondansetron Injectable 4 milliGRAM(s) IV Push every 6 hours PRN Nausea      OBJECTIVE:   [X] No new signs     [ ] Other:    Side Effects:  [X ] None			[ ] Other:    Assessment of Catheter Site:		[ ] Intact		[ ] Other:    ASSESSMENT/PLAN  [ X] Continue current therapy    [ ] Therapy changed to:    [ ] IV PCA       [ ] Epidural     [ ] prn Analgesics     Comments: Patient's pain well controlled. Continue IVPCA at the current settings.    Note entered after patient seen
	    CHIEF COMPLAINT: FOLLOW UP IN ICU FOR POSTOPERATIVE CARE OF PATIENT WHO IS S/P LUNG RESECTION      PROCEDURES: Robot-assisted tracheobronchoplasty 14-Feb-2023       ISSUES:   Tracheobronchomalacia  Post op pain  Chest tube in place  Asthma  Anxiety  CKD  GERD  HTN  Hx of obesity s/p gastric banding (2007 and documented refused to deflate lap band 12/11/2019)  PTSD  Cervical radiculopathy    INTERVAL EVENTS:   OR today. Extubated in OR. Transferred to CTICU.      HISTORY:   Patient reports moderate pain at chest wall incision sites which is worse with coughing and deep breathing without associated fever or dyspnea. Pain is improved with use of pain meds.     PHYSICAL EXAM:   Gen: Comfortable, No acute distress  Eyes: Sclera white, Conjunctiva normal, Eyelids normal, Pupils symmetrical   ENT: Mucous membranes moist,  ,  ,    Neck: Trachea midline,  ,  ,  ,  ,  ,    CV: Rate regular, Rhythm regular,  ,  ,    Resp: Breath sounds clear, No accessory muscles use, R chest tube in place,  ,    Abd: Soft, Non-distended, Non-tender,   ,  ,  ,    Skin: Warm, No peripheral edema of lower extremities,  ,    : No li  Neuro: Moving all 4 extremities,    Psych: A&Ox3      ASSESSMENT AND PLAN:     NEURO:  Post-operative Pain - Stable. Pain control with PCA and Tylenol IV PRN.           Anxiety disorder - stable. continue psych meds.           RESPIRATORY:  Hypoxia - Wean nasal cannula for goal O2sat above 92. Obtain CXR. Incentive spirometry. Chest PT and frequent suctioning. Continue bronchodilators. OOB to chair & ambulate w/ assistance. Continuous pulse oximetry for support & to prevent decompensation.       Chest tube – Pleurevac regulated suctioning. Monitor chest tube output.       Asthma - worsened by thoracic surgery. Continue bronchodilators.            Tracheobronchomalacia - Improved s/p tracheobronchoplasty. Monitor repeat CXR.           CARDIOVASCULAR:  Hemodynamically stable - Not on pressors. Continue hemodynamic monitoring.  Telemetry (medical test) - Reviewed by me today independently. Normal sinus rhythm.  HTN - stable. Hold home antihypertensives for now.               RENAL:  CKD – Stable. Renally dose medications. Monitor for hyperkalemia and uremia. Avoid nephrotoxic medications. Monitor IOs and electrolytes.        GASTROINTESTINAL:  GI prophylaxis not indicated  Zofran and Reglan IV PRN for nausea  Regular consistency diet        GERD - stable. Continue pantoprazole           HEMATOLOGIC:  No signs of active bleeding. Monitor Hgb in CBC in AM  DVT prophylaxis with heparin subQ and SCDs.           INFECTIOUS DISEASE:  All surgical sites appear clean. No signs of active infection. Will monitor for fever and leukocytosis.          ENDOCRINE:  Stable – Monitor glucose fingersticks for goal 120-180.       Pertinent clinical, laboratory, radiographic, hemodynamic, echocardiographic, respiratory data, microbiologic data and chart were reviewed by myself and analyzed frequently throughout the course of the day and night by myself.    Plan discussed at length with the CTICU staff and Attending CT Surgeon -   Dr Alvin Stubbs.      Patient's status was discussed with patient at bedside.     	      ________________________________________________      _________________________  VITAL SIGNS:  Vital Signs Last 24 Hrs  T(C): 36.4 (14 Feb 2023 19:00), Max: 36.5 (14 Feb 2023 06:41)  T(F): 97.5 (14 Feb 2023 19:00), Max: 97.7 (14 Feb 2023 06:41)  HR: 82 (14 Feb 2023 19:00) (58 - 84)  BP: 121/71 (14 Feb 2023 19:00) (101/55 - 128/66)  BP(mean): 81 (14 Feb 2023 19:00) (71 - 83)  RR: 14 (14 Feb 2023 19:00) (12 - 19)  SpO2: 99% (14 Feb 2023 19:00) (99% - 100%)    Parameters below as of 14 Feb 2023 19:00  Patient On (Oxygen Delivery Method): room air      I/Os:   I&O's Detail    14 Feb 2023 07:01  -  14 Feb 2023 20:10  --------------------------------------------------------  IN:    Oral Fluid: 320 mL  Total IN: 320 mL    OUT:    Chest Tube (mL): 15 mL  Total OUT: 15 mL    Total NET: 305 mL              MEDICATIONS:  MEDICATIONS  (STANDING):  acetaminophen   IVPB .. 1000 milliGRAM(s) IV Intermittent once  budesonide 160 MICROgram(s)/formoterol 4.5 MICROgram(s) Inhaler 2 Puff(s) Inhalation two times a day  gabapentin 100 milliGRAM(s) Oral every 8 hours  heparin   Injectable 5000 Unit(s) SubCutaneous every 8 hours  HYDROmorphone PCA (1 mG/mL) 30 milliLiter(s) PCA Continuous PCA Continuous  lactated ringers. 1000 milliLiter(s) (30 mL/Hr) IV Continuous <Continuous>  montelukast 10 milliGRAM(s) Oral at bedtime  pantoprazole    Tablet 40 milliGRAM(s) Oral before breakfast  polyethylene glycol 3350 17 Gram(s) Oral daily  senna 2 Tablet(s) Oral at bedtime  tiotropium 2.5 MICROgram(s) Inhaler 2 Puff(s) Inhalation daily    MEDICATIONS  (PRN):  butorphanol Injectable 0.125 milliGRAM(s) IV Push every 6 hours PRN Pruritus  diphenhydrAMINE Injectable 50 milliGRAM(s) IV Push every 4 hours PRN Pruritus  HYDROmorphone  Injectable 0.5 milliGRAM(s) IV Push every 10 minutes PRN Moderate Pain (4 - 6)  HYDROmorphone  Injectable 1 milliGRAM(s) IV Push every 10 minutes PRN Severe Pain (7 - 10)  HYDROmorphone PCA (1 mG/mL) Rescue Clinician Bolus 0.5 milliGRAM(s) IV Push every 15 minutes PRN for Pain Scale GREATER THAN 6  naloxone Injectable 0.1 milliGRAM(s) IV Push every 3 minutes PRN For ANY of the following changes in patient status:  A. RR LESS THAN 10 breaths per minute, B. Oxygen saturation LESS THAN 90%, C. Sedation score of 6  ondansetron Injectable 4 milliGRAM(s) IV Push every 6 hours PRN Nausea  ondansetron Injectable 4 milliGRAM(s) IV Push once PRN Nausea and/or Vomiting      LABS:  Pre-op Laboratory data was independently reviewed by me today.   2/1/23 - Hgb 15  1/3/23 - Cr 1.22                    RADIOLOGY:   Radiology images were independently reviewed by me today. Reports were reviewed by me today.    Xray Chest 1 View- PORTABLE-Urgent:   ACC: [id number] EXAM:  XR CHEST PORTABLE URGENT 1V   ORDERED BY: LIVAN SHEIKH     PROCEDURE DATE:  02/14/2023          INTERPRETATION:  INDICATION: Cough.    COMPARISON: Chest x-ray 12/14/2022.    TECHNIQUE: Single frontal view of the chest.    FINDINGS:  Lines and tubes: Right apical chest tube.  Heart/Vascular: Heart size normal.  Pulmonary:  No obvious consolidations or pneumothorax.  Bones: No acute bony abnormalities.    Impression: Clear lungs with right chest tube.          --- End of Report ---          YAZ MCDOWELL MD; Resident Radiologist  This document has been electronically signed.  SORAYA ROCHE MD; Attending Radiologist  This document has been electronically signed. Feb 14 2023  3:08PM (02-14-23 @ 13:43)  
Anesthesia Pain Management Service    SUBJECTIVE: Patient reports pain with movement but IV Tylenol, PCA and rescue boluses have helped. Patient is prescribed Gabapentin 100mg TID by his pulmonologist. He also uses medical marijuana for pain and PTSD. Patient states he was a former [employer] who was working at [facility] on 9/11.    Pain Scale Score	At rest: ___ 	With Activity: ___ 	[X ] Refer to charted pain scores    THERAPY:    [ ] IV PCA Morphine		[ ] 5 mg/mL	[ ] 1 mg/mL  [X ] IV PCA Hydromorphone	[ ] 5 mg/mL	[X ] 1 mg/mL  [ ] IV PCA Fentanyl		[ ] 50 micrograms/mL    Demand dose __0.2_ lockout __6_ (minutes) Continuous Rate _0__ Total: _8.1__   mg used (in past 24 hrs)      MEDICATIONS  (STANDING):  acetaminophen   IVPB .. 1000 milliGRAM(s) IV Intermittent once  budesonide 160 MICROgram(s)/formoterol 4.5 MICROgram(s) Inhaler 2 Puff(s) Inhalation two times a day  gabapentin 100 milliGRAM(s) Oral every 8 hours  heparin   Injectable 5000 Unit(s) SubCutaneous every 8 hours  HYDROmorphone PCA (1 mG/mL) 30 milliLiter(s) PCA Continuous PCA Continuous  lactated ringers. 1000 milliLiter(s) (30 mL/Hr) IV Continuous <Continuous>  levalbuterol Inhalation 0.63 milliGRAM(s) Inhalation every 8 hours  lidocaine   4% Patch 1 Patch Transdermal every 24 hours  montelukast 10 milliGRAM(s) Oral at bedtime  pantoprazole    Tablet 40 milliGRAM(s) Oral before breakfast  polyethylene glycol 3350 17 Gram(s) Oral daily  senna 2 Tablet(s) Oral at bedtime  tiotropium 2.5 MICROgram(s) Inhaler 2 Puff(s) Inhalation daily    MEDICATIONS  (PRN):  butorphanol Injectable 0.125 milliGRAM(s) IV Push every 6 hours PRN Pruritus  diphenhydrAMINE Injectable 50 milliGRAM(s) IV Push every 4 hours PRN Pruritus  HYDROmorphone PCA (1 mG/mL) Rescue Clinician Bolus 0.5 milliGRAM(s) IV Push every 15 minutes PRN for Pain Scale GREATER THAN 6  naloxone Injectable 0.1 milliGRAM(s) IV Push every 3 minutes PRN For ANY of the following changes in patient status:  A. RR LESS THAN 10 breaths per minute, B. Oxygen saturation LESS THAN 90%, C. Sedation score of 6  ondansetron Injectable 4 milliGRAM(s) IV Push every 6 hours PRN Nausea      OBJECTIVE: Patient sitting up in chair, CTx1    Sedation Score:	[ X] Alert	[ ] Drowsy 	[ ] Arousable	[ ] Asleep	[ ] Unresponsive    Side Effects:	[X ] None	[ ] Nausea	[ ] Vomiting	[ ] Pruritus  		[ ] Other:    Vital Signs Last 24 Hrs  T(C): 36.6 (15 Feb 2023 08:00), Max: 36.7 (15 Feb 2023 00:00)  T(F): 97.9 (15 Feb 2023 08:00), Max: 98 (15 Feb 2023 00:00)  HR: 92 (15 Feb 2023 09:00) (58 - 92)  BP: 124/69 (14 Feb 2023 20:00) (104/64 - 128/66)  BP(mean): 79 (14 Feb 2023 20:00) (71 - 83)  RR: 20 (15 Feb 2023 09:00) (10 - 22)  SpO2: 100% (15 Feb 2023 09:00) (93% - 100%)    Parameters below as of 15 Feb 2023 09:00  Patient On (Oxygen Delivery Method): room air        ASSESSMENT/ PLAN    Therapy to  be:	[ ] Continue   [ X] Discontinued   [X ] Change to prn Analgesics    Documentation and Verification of current medications:   [X] Done	[ ] Not done, not elligible    Comments: Continue IV PCA. Demand dose increased to 0.25mg. PRN Oral/IV opioids and/or Adjuvant non-opioid medication to be ordered at this point.    Progress Note written now but Patient was seen earlier.

## 2023-02-16 NOTE — DISCHARGE NOTE NURSING/CASE MANAGEMENT/SOCIAL WORK - NSDCFUADDAPPT_GEN_ALL_CORE_FT
[facility] NEPHROLOGY  Ignacio Gonsales M.D.  Jaya Lauren D.O.  Jasmin Alvarenga M.D.  Madeline Valerio, MSN, ANP-C    Telephone: [phone]  Facsimile: [phone]    [address] [place] [address]   Follow up with Dr. Stubbs in 1-2 weeks [phone]   Chest x-ray prior to appointment with Dr. Stubbs

## 2023-02-16 NOTE — DISCHARGE NOTE NURSING/CASE MANAGEMENT/SOCIAL WORK - PATIENT PORTAL LINK FT
You can access the FollowMyHealth Patient Portal offered by [facility] by registering at the following website: [email or web address]. By joining [employer]’s FollowMyHealth portal, you will also be able to view your health information using other applications (apps) compatible with our system.

## 2023-02-16 NOTE — DISCHARGE NOTE NURSING/CASE MANAGEMENT/SOCIAL WORK - NSDCPEFALRISK_GEN_ALL_CORE
For information on Fall & Injury Prevention, visit: [email or web address] OR  [email or web address] OR  https://www.cdc.gov/steadi/patient.html

## 2023-02-17 ENCOUNTER — NON-APPOINTMENT (OUTPATIENT)
Age: 58
End: 2023-02-17

## 2023-02-17 PROBLEM — Z87.09 PERSONAL HISTORY OF OTHER DISEASES OF THE RESPIRATORY SYSTEM: Chronic | Status: ACTIVE | Noted: 2023-02-01

## 2023-02-17 PROBLEM — R93.89 ABNORMAL FINDINGS ON DIAGNOSTIC IMAGING OF OTHER SPECIFIED BODY STRUCTURES: Chronic | Status: ACTIVE | Noted: 2023-02-01

## 2023-02-17 PROBLEM — J39.8 OTHER SPECIFIED DISEASES OF UPPER RESPIRATORY TRACT: Chronic | Status: ACTIVE | Noted: 2023-02-01

## 2023-02-17 PROBLEM — D64.9 ANEMIA, UNSPECIFIED: Chronic | Status: ACTIVE | Noted: 2023-02-01

## 2023-02-17 PROBLEM — K21.9 GASTRO-ESOPHAGEAL REFLUX DISEASE WITHOUT ESOPHAGITIS: Chronic | Status: ACTIVE | Noted: 2023-02-01

## 2023-02-17 PROBLEM — R49.0 DYSPHONIA: Chronic | Status: ACTIVE | Noted: 2023-02-01

## 2023-02-17 PROBLEM — J98.09 OTHER DISEASES OF BRONCHUS, NOT ELSEWHERE CLASSIFIED: Chronic | Status: ACTIVE | Noted: 2023-02-01

## 2023-02-17 LAB — SURGICAL PATHOLOGY STUDY: SIGNIFICANT CHANGE UP

## 2023-02-24 ENCOUNTER — OUTPATIENT (OUTPATIENT)
Dept: OUTPATIENT SERVICES | Facility: HOSPITAL | Age: 58
LOS: 1 days | End: 2023-02-24
Payer: COMMERCIAL

## 2023-02-24 ENCOUNTER — APPOINTMENT (OUTPATIENT)
Dept: RADIOLOGY | Facility: CLINIC | Age: 58
End: 2023-02-24
Payer: COMMERCIAL

## 2023-02-24 DIAGNOSIS — Z96.641 PRESENCE OF RIGHT ARTIFICIAL HIP JOINT: Chronic | ICD-10-CM

## 2023-02-24 DIAGNOSIS — Z98.890 OTHER SPECIFIED POSTPROCEDURAL STATES: Chronic | ICD-10-CM

## 2023-02-24 DIAGNOSIS — Z98.1 ARTHRODESIS STATUS: Chronic | ICD-10-CM

## 2023-02-24 DIAGNOSIS — Z00.00 ENCOUNTER FOR GENERAL ADULT MEDICAL EXAMINATION WITHOUT ABNORMAL FINDINGS: ICD-10-CM

## 2023-02-24 DIAGNOSIS — K21.9 GASTRO-ESOPHAGEAL REFLUX DISEASE WITHOUT ESOPHAGITIS: ICD-10-CM

## 2023-02-24 DIAGNOSIS — Z98.84 BARIATRIC SURGERY STATUS: Chronic | ICD-10-CM

## 2023-02-24 DIAGNOSIS — Z90.49 ACQUIRED ABSENCE OF OTHER SPECIFIED PARTS OF DIGESTIVE TRACT: Chronic | ICD-10-CM

## 2023-02-24 PROCEDURE — 71046 X-RAY EXAM CHEST 2 VIEWS: CPT

## 2023-02-24 PROCEDURE — 71046 X-RAY EXAM CHEST 2 VIEWS: CPT | Mod: 26

## 2023-03-02 ENCOUNTER — APPOINTMENT (OUTPATIENT)
Dept: THORACIC SURGERY | Facility: CLINIC | Age: 58
End: 2023-03-02
Payer: COMMERCIAL

## 2023-03-02 VITALS
RESPIRATION RATE: 17 BRPM | SYSTOLIC BLOOD PRESSURE: 106 MMHG | OXYGEN SATURATION: 97 % | WEIGHT: 180 LBS | DIASTOLIC BLOOD PRESSURE: 71 MMHG | HEIGHT: 68 IN | BODY MASS INDEX: 27.28 KG/M2 | HEART RATE: 78 BPM

## 2023-03-02 PROCEDURE — 99024 POSTOP FOLLOW-UP VISIT: CPT

## 2023-03-02 RX ORDER — BUDESONIDE, GLYCOPYRROLATE, AND FORMOTEROL FUMARATE 160; 9; 4.8 UG/1; UG/1; UG/1
160-9-4.8 AEROSOL, METERED RESPIRATORY (INHALATION)
Qty: 3 | Refills: 1 | Status: COMPLETED | OUTPATIENT
Start: 2022-01-24 | End: 2023-03-02

## 2023-03-02 RX ORDER — DICLOFENAC SODIUM 1% 10 MG/G
1 GEL TOPICAL
Qty: 100 | Refills: 0 | Status: COMPLETED | COMMUNITY
Start: 2020-10-12 | End: 2023-03-02

## 2023-03-06 NOTE — CONSULT LETTER
[Dear  ___] : Dear  [unfilled], [Consult Letter:] : I had the pleasure of evaluating your patient, [unfilled]. [( Thank you for referring [unfilled] for consultation for _____ )] : Thank you for referring [unfilled] for consultation for [unfilled] [Please see my note below.] : Please see my note below. [Consult Closing:] : Thank you very much for allowing me to participate in the care of this patient.  If you have any questions, please do not hesitate to contact me. [Sincerely,] : Sincerely, [FreeTextEntry2] : Dr. Justice Lama (Pulm/Referring) [FreeTextEntry3] : Alvin Stubbs MD, MPH \par System Director of Thoracic Surgery \par Director of Comprehensive Lung and Foregut [facility] \par Professor Cardiovascular & Thoracic Surgery  \par [facility] [employer] [facility]\par \par [facility]\par [address]\par Oncology [address]\[place] [address]\par Tel: [phone]\par Fax: [phone]\par

## 2023-03-06 NOTE — ASSESSMENT
[FreeTextEntry1] : Mr. 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 Justice Lama. \par \par Now s/p awake bronchoscopy on 1/12/23. Upon deep exhalation, I was able to appreciate approximately 70% posterior collapse of the distal trachea and 70% collapse of the right mainstem bronchus. There was approximately 60% collapse of the left mainstem bronchus.\par \par Now s/p a Flex Bronch, Rt VATS R.A. MLND, tracheobronchoplasty on 2/14/23. Path of Levels 11, 7, and 10 LNs all reactive.\par \par CXR today -- reviewed.\par \par I have reviewed the patient's medical records and diagnostic images at time of this office consultation and have made the following recommendation:\par 1. Path reviewed and explained to patient, patient has significant improvement, RTC in 3-4mo with CXR.\par \par \par I, ANGELIA George, personally performed the evaluation and management (E/M) services for this established patient who presents today with (a) new problem(s)/exacerbation of (an) existing condition(s). That E/M includes conducting the examination, assessing all new/exacerbated conditions, and establishing a new plan of care. Today, my ACP, Saba Root NP was here to observe my evaluation and management services for this new problem/exacerbated condition to be followed going forward.\par \par

## 2023-03-06 NOTE — REASON FOR VISIT
[de-identified] : Flex Bronch, Rt VATS R.A. MLND, tracheobronchoplasty [de-identified] : 2/14/23

## 2023-03-06 NOTE — DISCUSSION/SUMMARY
[Doing Well] : is doing well [Excellent Pain Control] : has excellent pain control [No Sign of Infection] : is showing no signs of infection [Remove Sutures/Staples] : removed sutures/staples [de-identified] : admits to increased sensation to Rt anterior chest wall

## 2023-03-16 ENCOUNTER — APPOINTMENT (OUTPATIENT)
Dept: PULMONOLOGY | Facility: CLINIC | Age: 58
End: 2023-03-16
Payer: COMMERCIAL

## 2023-03-16 VITALS
HEIGHT: 68 IN | SYSTOLIC BLOOD PRESSURE: 110 MMHG | DIASTOLIC BLOOD PRESSURE: 70 MMHG | BODY MASS INDEX: 27.43 KG/M2 | TEMPERATURE: 97.1 F | OXYGEN SATURATION: 97 % | RESPIRATION RATE: 17 BRPM | HEART RATE: 67 BPM | WEIGHT: 181 LBS

## 2023-03-16 PROCEDURE — 99214 OFFICE O/P EST MOD 30 MIN: CPT | Mod: CS

## 2023-03-16 RX ORDER — BACLOFEN 10 MG/1
10 TABLET ORAL 3 TIMES DAILY
Qty: 90 | Refills: 1 | Status: ACTIVE | COMMUNITY
Start: 2023-03-16 | End: 1900-01-01

## 2023-03-16 NOTE — PHYSICAL EXAM
[No Acute Distress] : no acute distress [Normal Oropharynx] : normal oropharynx [Normal Appearance] : normal appearance [No Neck Mass] : no neck mass [Normal Rate/Rhythm] : normal rate/rhythm [Normal S1, S2] : normal s1, s2 [No Murmurs] : no murmurs [No Resp Distress] : no resp distress [No Abnormalities] : no abnormalities [Benign] : benign [Normal Gait] : normal gait [No Clubbing] : no clubbing [No Cyanosis] : no cyanosis [No Edema] : no edema [FROM] : FROM [Normal Color/ Pigmentation] : normal color/ pigmentation [No Focal Deficits] : no focal deficits [Oriented x3] : oriented x3 [Normal Affect] : normal affect [II] : Mallampati Class: II [TextBox_68] : I:E 1:3;clear

## 2023-03-16 NOTE — H&P PST ADULT - HIV STATUS
Begin pre-op drops Ofloxacin  and Ketorolac 4 times per day in the RIGHT EYE on Tuesday 03/28/2023.    Nothing to eat or drink after midnight on Tuesday 03/28/2023 except for isosorbide, take with a sip of water the morning of surgery.    Negative/Unknown

## 2023-03-16 NOTE — HISTORY OF PRESENT ILLNESS
[FreeTextEntry1] : Mr. Corrales is a 57 year old male with a history of allergic rhinitis, asthma, abnormal chest CT, and GERD presenting to the office today for a follow up visit. His chief complaint is\par \par -s/p tracheobronchoplasty surgery with Alvin Stubbs\par -he notes that he initially felt much better after the surgery but then the following Monday dysphonia and dry cough began\par -he notes the cough is not productive even when he feels mucus accumulation\par -he notes heartburn, reflux, and sour taste in the mouth\par -he notes his bowels are regular \par -he notes dry eyes\par \par -patient denies any headaches, nausea, vomiting, fever, chills, sweats, chest pain, chest pressure, palpitations, wheezing, myalgias, dizziness, leg swelling, leg pain, itchy ears,

## 2023-03-16 NOTE — ASSESSMENT
[FreeTextEntry1] : **********************ALL PRINTED SCRIPTS TO BE FILLED THROUGH [facility] FUND*************************\par \par Mr. Corrales is a 57 year old male who has a history of asthma, allergy, GERD, cervicale spine Dz (s/p surgery 12/19)  and abnormal CT. He is s/p WTC exposure -  s/p complicated cervical spine surgery (12/2019) now s/p COVID 19 12/2020- s/p THR (right) 5/2021- s/p COVID-19 vaccine reaction 12/2021 - +TBM right Bronchomalacia - s/p TBM surgery (Enoc) - post op VC issues/ dry cough\par \par \par problem 1: abnormal chest CT - c/w inflammation - ?COVID-19 residue (improved \par -PPD Quantiferon gold (-), ESR / Hypersensitivity panel (wnl)\par -Follow up Chest CT 3/2021 - 9/2021, 3/2022, 3/2023, next 6/2023\par \par problem 2: asthma (semi persistent) \par -continue Breztri 2 puffs BID = Symbicort/Spiriva/Asmanex \par -Continue Asmanex 200 2 inhalations BID\par -continue to use Symbicort 160 at 2 inhalations BID\par -continue Spiriva at 2 inhalation QD \par -continue to use Singulair 10 mg QHS \par -continue to use Xopenex PRN / Xopenex 0.63 via nebulizer q6H prn\par -Asthma is  believed to be caused by inherited (genetic) and environmental factor, but its exact cause is unknown. Asthma may be triggered by allergens, lung infections, or irritants in the air. Asthma triggers are different for each person\par -Inhaler technique reviewed as well as oral hygiene techniques reviewed with patient. Avoidance of cold air, extremes of temperature, rescue inhaler should be used before exercise. Order of medication reviewed with patient. Recommended use of a cool mist humidifier in the bedroom.\par \par problem 2A: Biologic evaluation\par - Script given for blood work: asthma profile, food IgE panel, eosinophil level, IgE level, Vitamin D level \par -considering Dupixent (Prezant) 7/2023\par - The safety and efficacy of Nucala was established in three double-blind, randomized, placebo-controlled trials in patients with severe asthma. Compared to a placebo, patients with severe asthma receiving Nucala had fewer exacerbation requiring hospitalization and/or emergency department visits, and a longer time to first exacerbation. In addition, patients with severe asthma receiving Nucala or Fasenra experienced greater reductions in their daily maintenance oral corticosteroid dose, while maintaining asthma control compared with patients receiving placebo. Treatment with Nucala did not result in a significant improvement in lung function, as measured by the volume of air exhaled by patients in one second. The most common side effects include: headache, injection site reactions, back pain, weakness, and fatigue; hypersensitivity reactions can occur within hours or days including swelling of the face, mouth, and tongue, fainting, dizziness, hives, breathing problems, and rash; herpes zoster infections have occurred. The drug is a monoclonal antibody that inhibits interleukin-5 which helps regular eosinophils, a type of white blood cell that contributes to asthma. The over-production of eosinophils can cause inflammation in the lungs, increasing the frequency of asthma attacks. Patients must also take other medications, including high dose inhaled corticosteroids and at least one additional asthma drug. \par \par problem 3: cough; ? Sensory neuropathic cough (BM)\par -off Amitriptyline 10 mg up to TID, prn (DC if able)\par -Neurontin 100 Q8H \par -continue Promethazine DM prn\par Sensory neuropathic cough is an etiology of cough that is often realized once someone has been ruled out for common disease such as: asthma, COPD, eosinophilic bronchitis, bronchiectasis, post nasal drip, and GERD. It sometimes develops following a URI, herpes zoster outbreak in pharynx or thyroid or cervical spine injury. However, many patients have no identifiable antecedent explanation. \par \par Problem 3A: +TBM right Bronchomalacia (s/p surgery 2/2023)\par -s/p Dynamic CT- CTS eval Dr Alvin Stubbs\par -Tracheomalacia is usually acquired in adults and common causes include damage by tracheostomy or endotracheal intubation damaging the tracheal cartilage with increase risk with multiple intubations, prolonged intubation, and concurrent high dose steroid therapy; external chest wall trauma and surgery; chronic compression of the trachea by benign etiologies (eg, benign mediastinal goiter) or malignancy; relapsing polychondritis; or recurrent infection. Tracheomalacia can be asymptomatic, however signs or symptoms can develop as the severity of the airway narrowing progresses with major symptoms include dyspnea, cough, and sputum retention. Other symptoms include severe paroxysms of coughing, wheezing or stridor, barking cough and may be exacerbated by forced expiration, cough, and valsalva maneuver. Tracheomalacia is diagnosed by a bronchoscopic visualization of dynamic airway collapse on dynamic chest CT. Therapy is warranted in symptomatic patients with severe tracheomalacia and includes surgical repair as tracheobronchoplasty. The patient was referred to Dr. Alvin Stubbs or Dr. Vinny Lilly, at [facility] for a surgical consult. \par \par Problem 3B: Pulmonary Pre-Op Clearance for Bronchomalacia and Tracheomalacia Surgery\par -at this point in time there are no absolute pulmonary contraindications to go forward with the planned procedure \par -at the time of surgery s/he should have optimal pain control, incentive spirometry, early ambulation, DVT and GI prophylaxis. \par \par problem 4: allergic rhinitis \par -recommended to use "Navage" nasal rinse device \par -continue Claritin 10 mg QAM \par -continue to use Xyzal 5 mg QHS\par -continue to use Omnaris 1 sniff/nostril BID\par -continue Olopatadine 1 sniff each nostril BID (.6)\par -follow up with Dr. Ok Islas  - s/p turbinectomy \par -Environmental measures for allergies were encouraged including mattress and pillow cover, air purifier, and environmental controls.\par \par Problem 4A: Laryngospasm\par -Speech Rx\par -transition bethanechol 5 mg every 8 hours to Baclofen 10 mg premeal, QHS \par \par Problem 4B: VC dysfunction\par -Diblasi\par -add Aerobika \par \par problem 5: GERD (FeeSST- c/w LPR)\par -continue to use Dexilant 60 mg before first meal\par -continue to use Pepcid 40 mg QHS \par -Rule of 2s: avoid eating too much, eating too late, eating too spicy, eating two hours before bed\par -Things to avoid including overeating, spicy foods, tight clothing, eating within three hours of bed, this list is not all inclusive. \par -For treatment of reflux, possible options discussed including diet control, H2 blockers, PPIs, as well as coating motility agents discussed as treatment options. Timing of meals and proximity of last meal to sleep were discussed. If symptoms persist, a formal gastrointestinal evaluation is needed. \par \par problem 6: insomnia\par - medical marijuana\par -continue to use Ambien PRN \par -recommended good sleep hygiene\par -Good sleep hygiene was encouraged including avoiding watching television an hour before bed, keeping caffeine at a low,  avoiding reading, television, or anything, in bed, no drinking any liquids three hours before bedtime, and only getting into bed when tired and ready for sleep.\par \par problem 7: foot cramps (resolved)\par -continue to use MyoCalm PM\par \par Problem 8a: s/p COVID 19 infection 12/2020 (residual Sx) \par -Recommended not to get an additional COVID-19 booster at this time until it is updated against the current variants. If planning on travelling, obtaining another booster within 2 weeks of departure is recommended. \par -Covid 19 preclusions, vaccine and booster discussed at length and recommended \par - S/p Covid 19 vaccine (Moderna) x3 (12/2021)\par -s/p MAB infusion \par -educated patient on COVID 19 vaccine and appropriate recommendations \par -vaccine pending\par COVID-19 precautionary Immune Support Recommendations:\par -OTC Vitamin C 1000mg BID \par -OTC Quercetin 500mg BID \par -OTC Zinc 50 mg per day \par -OTC Melatonin 5 mg a night \par -OTC Vitamin D 2000mg per day \par -OTC Tonic Water 8oz per day\par -Water 0.5-1 gallon per day\par Additional Support Supplements: \par -Liposomal Glutathione 500 mg BID\par -SPM 1 tablet BID \par -Berberine 1000 mg BID  \par - mg BID \par \par problem 8: health maintenance\par -PPD negative as of 9/20/2021\par -received flu shot in - 9/2021\par -recommended strep pneumonia vaccines: Prevnar-13 vaccine, followed by Pneumo vaccine 23 on year following\par -recommended early intervention for URIs\par -recommended osteoporosis evaluations\par -recommended early dermatological evaluations\par -recommended after the age of 50 to the age of 70, colonoscopy every 5 years\par -encouraged early intervention\par \par \par F/U in 4 months\par He is encouraged to call with any changes, concerns, or questions. \par \par ***********************ALL PRINTED SCRIPTS TO BE FILLED THROUGH [facility]D************************************

## 2023-03-16 NOTE — ADDENDUM
[FreeTextEntry1] : Documented by Imtiaz Sousa acting as a scribe for Dr. Justice Lama on 03/16/2023.\par \par All medical record entries made by the Scribe were at my, Dr. Justice Lama's, direction and personally dictated by me on 03/16/2023. I have reviewed the chart and agree that the record accurately reflects my personal performance of the history, physical exam, assessment and plan. I have also personally directed, reviewed, and agree with the discharge instructions.

## 2023-04-06 ENCOUNTER — APPOINTMENT (OUTPATIENT)
Dept: PULMONOLOGY | Facility: CLINIC | Age: 58
End: 2023-04-06
Payer: COMMERCIAL

## 2023-04-06 VITALS
BODY MASS INDEX: 25.46 KG/M2 | DIASTOLIC BLOOD PRESSURE: 60 MMHG | TEMPERATURE: 97.6 F | HEART RATE: 62 BPM | SYSTOLIC BLOOD PRESSURE: 124 MMHG | WEIGHT: 168 LBS | OXYGEN SATURATION: 98 % | HEIGHT: 68 IN | RESPIRATION RATE: 17 BRPM

## 2023-04-06 DIAGNOSIS — U07.1 COVID-19: ICD-10-CM

## 2023-04-06 PROCEDURE — 99214 OFFICE O/P EST MOD 30 MIN: CPT | Mod: CS

## 2023-04-06 RX ORDER — PREDNISONE 10 MG/1
10 TABLET ORAL
Qty: 50 | Refills: 0 | Status: ACTIVE | COMMUNITY
Start: 2023-04-06 | End: 1900-01-01

## 2023-04-06 NOTE — ADDENDUM
[FreeTextEntry1] : Documented by Nara Kilgore acting as a scribe for Dr. Justice Lama on 04/06/2023 \par \par All medical record entries made by the Scribe were at my, Dr. Justice Lama's, direction and personally dictated by me on 04/06/2023 . I have reviewed the chart and agree that the record accurately reflects my personal performance of the history, physical exam, assessment and plan. I have also personally directed, reviewed, and agree with the discharge instructions.

## 2023-04-06 NOTE — PROCEDURE
[FreeTextEntry1] : Swallow Study w/ Esophagram (3/20/2023) impression: Swallowing mechanism with retention of Gastrografin in piriform sinus. Esophagus is widely patent with no evidence of strictures. Good transit of contrast through left and through Giana loop with no evidence of obstruction. \par \par \par CXR (2.24.2023) reveals no radiographic lung disease. \par \par Blood work (11.17.2022) reveals: Immunoglobulin E 13, OCT/NOV 2022:  Absolute Eosinophils 158-200

## 2023-04-06 NOTE — PHYSICAL EXAM
[No Acute Distress] : no acute distress [Normal Oropharynx] : normal oropharynx [II] : Mallampati Class: II [Normal Appearance] : normal appearance [No Neck Mass] : no neck mass [Normal Rate/Rhythm] : normal rate/rhythm [Normal S1, S2] : normal s1, s2 [No Murmurs] : no murmurs [No Resp Distress] : no resp distress [No Abnormalities] : no abnormalities [Benign] : benign [Normal Gait] : normal gait [No Clubbing] : no clubbing [No Cyanosis] : no cyanosis [No Edema] : no edema [FROM] : FROM [Normal Color/ Pigmentation] : normal color/ pigmentation [No Focal Deficits] : no focal deficits [Oriented x3] : oriented x3 [Normal Affect] : normal affect [TextBox_68] : I:E 1:3;clear

## 2023-04-06 NOTE — HISTORY OF PRESENT ILLNESS
[FreeTextEntry1] : Mr. Corrales is a 57 year old male with a history of allergic rhinitis, asthma, abnormal chest CT, and GERD presenting to the office today for a follow up visit. His chief complaint is\par - since he was seen he saw Roshan. \par - he notes he saw Dr. 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 He was recommended botox. \par - he notes he is SOB from walking \par - he has been trying to walk around the block everyday but with cooler weather it has been difficult for him. \par - no nausea or vomiting \par -  he notes he will be getting a CXR on June 10th. \par -  he notes he only feels pain from the surgery \par - he notes his bowels are now regular\par - his sense of smell / taste have been compromised \par - he has been taking his maintenance inhaler (Spiriva), Asmanex, and Symbicort. \par - he notes when walking he usually has to take his rescue inhaler \par - He notes he had blood work done through [facility] and still waiting for those results \par -He denies any visual issues, headaches, nausea, vomiting, fever, chills, sweats, chest pains, chest pressure, diarrhea, constipation, dysphagia, myalgia, dizziness, leg swelling, leg pain, itchy eyes, itchy ears, heartburn, reflux, or sour taste in the mouth.

## 2023-04-06 NOTE — ASSESSMENT
[FreeTextEntry1] : **********************ALL PRINTED SCRIPTS TO BE FILLED THROUGH [facility] FUND*************************\par \par Mr. Corrales is a 57 year old male who has a history of asthma, allergy, GERD, cervicale spine Dz (s/p surgery 12/19)  and abnormal CT. He is s/p WTC exposure -  s/p complicated cervical spine surgery (12/2019) now s/p COVID 19 12/2020- s/p THR (right) 5/2021- s/p COVID-19 vaccine reaction 12/2021 - +TBM right Bronchomalacia - s/p TBM surgery (Enoc) - post op VC issues/ dry cough- SOB / HORVATH \par \par \par problem 1: abnormal chest CT - c/w inflammation - ?COVID-19 residue (improved \par -PPD Quantiferon gold (-), ESR / Hypersensitivity panel (wnl)\par -Follow up Chest CT 3/2021 - 9/2021, 3/2022, 3/2023, next 6/2023\par \par problem 2: asthma (semi persistent) - ?Active \par -continue Breztri 2 puffs BID = Symbicort/Spiriva/Asmanex \par -Continue Asmanex 200 2 inhalations BID\par -continue to use Symbicort 160 at 2 inhalations BID\par -continue Spiriva at 2 inhalation QD \par -continue to use Singulair 10 mg QHS \par -continue to use Xopenex PRN / Xopenex 0.63 via nebulizer q6H prn\par -add Prednisone 20 mg x 7 days, 10 mg x 7 days (Trial: 4/6/2023) \par Information sheet given to the patient to be reviewed, this medication is never to be used without consulting the prescribing physician. Proper dietary restraint is necessary specifically salt containing foods, if any reaction may occur should be reported. \par -Asthma is  believed to be caused by inherited (genetic) and environmental factor, but its exact cause is unknown. Asthma may be triggered by allergens, lung infections, or irritants in the air. Asthma triggers are different for each person\par -Inhaler technique reviewed as well as oral hygiene techniques reviewed with patient. Avoidance of cold air, extremes of temperature, rescue inhaler should be used before exercise. Order of medication reviewed with patient. Recommended use of a cool mist humidifier in the bedroom.\par \par problem 2A: Biologic evaluation\par - Script given for blood work: asthma profile, food IgE panel, eosinophil level, IgE level, Vitamin D level \par -considering Dupixent (Prezant) 7/2023\par - The safety and efficacy of Nucala was established in three double-blind, randomized, placebo-controlled trials in patients with severe asthma. Compared to a placebo, patients with severe asthma receiving Nucala had fewer exacerbation requiring hospitalization and/or emergency department visits, and a longer time to first exacerbation. In addition, patients with severe asthma receiving Nucala or Fasenra experienced greater reductions in their daily maintenance oral corticosteroid dose, while maintaining asthma control compared with patients receiving placebo. Treatment with Nucala did not result in a significant improvement in lung function, as measured by the volume of air exhaled by patients in one second. The most common side effects include: headache, injection site reactions, back pain, weakness, and fatigue; hypersensitivity reactions can occur within hours or days including swelling of the face, mouth, and tongue, fainting, dizziness, hives, breathing problems, and rash; herpes zoster infections have occurred. The drug is a monoclonal antibody that inhibits interleukin-5 which helps regular eosinophils, a type of white blood cell that contributes to asthma. The over-production of eosinophils can cause inflammation in the lungs, increasing the frequency of asthma attacks. Patients must also take other medications, including high dose inhaled corticosteroids and at least one additional asthma drug. \par \par problem 3: cough; ? Sensory neuropathic cough (BM)\par -off Amitriptyline 10 mg up to TID, prn (DC if able)\par -Neurontin 100 Q8H \par -continue Promethazine DM prn\par Sensory neuropathic cough is an etiology of cough that is often realized once someone has been ruled out for common disease such as: asthma, COPD, eosinophilic bronchitis, bronchiectasis, post nasal drip, and GERD. It sometimes develops following a URI, herpes zoster outbreak in pharynx or thyroid or cervical spine injury. However, many patients have no identifiable antecedent explanation. \par \par Problem 3A: +TBM right Bronchomalacia (s/p surgery 2/2023)\par -s/p Dynamic CT- CTS eval Dr Alvin Stubbs\par -Tracheomalacia is usually acquired in adults and common causes include damage by tracheostomy or endotracheal intubation damaging the tracheal cartilage with increase risk with multiple intubations, prolonged intubation, and concurrent high dose steroid therapy; external chest wall trauma and surgery; chronic compression of the trachea by benign etiologies (eg, benign mediastinal goiter) or malignancy; relapsing polychondritis; or recurrent infection. Tracheomalacia can be asymptomatic, however signs or symptoms can develop as the severity of the airway narrowing progresses with major symptoms include dyspnea, cough, and sputum retention. Other symptoms include severe paroxysms of coughing, wheezing or stridor, barking cough and may be exacerbated by forced expiration, cough, and valsalva maneuver. Tracheomalacia is diagnosed by a bronchoscopic visualization of dynamic airway collapse on dynamic chest CT. Therapy is warranted in symptomatic patients with severe tracheomalacia and includes surgical repair as tracheobronchoplasty. The patient was referred to Dr. Alvin Stubbs or Dr. Vinny Lilly, at [facility] for a surgical consult. \par \par \par problem 4: allergic rhinitis \par -recommended to use "Navage" nasal rinse device \par -continue Claritin 10 mg QAM \par -continue to use Xyzal 5 mg QHS\par -continue to use Omnaris 1 sniff/nostril BID\par -continue Olopatadine 1 sniff each nostril BID (.6)\par -follow up with Dr. Ok Islas  - s/p turbinectomy \par -Environmental measures for allergies were encouraged including mattress and pillow cover, air purifier, and environmental controls.\par \par Problem 4A: Laryngospasm\par -Speech Rx\par -transition bethanechol 5 mg every 8 hours to Baclofen 10 mg premeal, QHS \par \par Problem 4B: VC dysfunction\par -Diblasi / Dawson ( [facility]) \par -add Aerobika \par \par problem 5: GERD (FeeSST- c/w LPR)\par -continue to use Dexilant 60 mg before first meal\par -continue to use Pepcid 40 mg QHS \par -Rule of 2s: avoid eating too much, eating too late, eating too spicy, eating two hours before bed\par -Things to avoid including overeating, spicy foods, tight clothing, eating within three hours of bed, this list is not all inclusive. \par -For treatment of reflux, possible options discussed including diet control, H2 blockers, PPIs, as well as coating motility agents discussed as treatment options. Timing of meals and proximity of last meal to sleep were discussed. If symptoms persist, a formal gastrointestinal evaluation is needed. \par \par problem 6: insomnia\par - medical marijuana\par -continue to use Ambien PRN \par -recommended good sleep hygiene\par -Good sleep hygiene was encouraged including avoiding watching television an hour before bed, keeping caffeine at a low,  avoiding reading, television, or anything, in bed, no drinking any liquids three hours before bedtime, and only getting into bed when tired and ready for sleep.\par \par problem 7: foot cramps (resolved)\par -continue to use MyoCalm PM\par \par Problem 8a: s/p COVID 19 infection 12/2020 (residual Sx) \par -Recommended not to get an additional COVID-19 booster at this time until it is updated against the current variants. If planning on travelling, obtaining another booster within 2 weeks of departure is recommended. \par -Covid 19 preclusions, vaccine and booster discussed at length and recommended \par - S/p Covid 19 vaccine (Moderna) x3 (12/2021)\par -s/p MAB infusion \par -educated patient on COVID 19 vaccine and appropriate recommendations \par -vaccine pending\par COVID-19 precautionary Immune Support Recommendations:\par -OTC Vitamin C 1000mg BID \par -OTC Quercetin 500mg BID \par -OTC Zinc 50 mg per day \par -OTC Melatonin 5 mg a night \par -OTC Vitamin D 2000mg per day \par -OTC Tonic Water 8oz per day\par -Water 0.5-1 gallon per day\par Additional Support Supplements: \par -Liposomal Glutathione 500 mg BID\par -SPM 1 tablet BID \par -Berberine 1000 mg BID  \par - mg BID \par \par problem 8: health maintenance\par -PPD negative as of 9/20/2021\par -received flu shot in - 9/2021\par -recommended strep pneumonia vaccines: Prevnar-13 vaccine, followed by Pneumo vaccine 23 on year following\par -recommended early intervention for URIs\par -recommended osteoporosis evaluations\par -recommended early dermatological evaluations\par -recommended after the age of 50 to the age of 70, colonoscopy every 5 years\par -encouraged early intervention\par \par \par F/U in 4 months\par He is encouraged to call with any changes, concerns, or questions. \par \par ***********************ALL PRINTED SCRIPTS TO BE FILLED THROUGH [facility]************************************

## 2023-04-17 ENCOUNTER — TRANSCRIPTION ENCOUNTER (OUTPATIENT)
Age: 58
End: 2023-04-17

## 2023-04-26 ENCOUNTER — APPOINTMENT (OUTPATIENT)
Dept: PULMONOLOGY | Facility: CLINIC | Age: 58
End: 2023-04-26
Payer: COMMERCIAL

## 2023-04-26 VITALS
SYSTOLIC BLOOD PRESSURE: 90 MMHG | DIASTOLIC BLOOD PRESSURE: 60 MMHG | HEIGHT: 68 IN | HEART RATE: 70 BPM | WEIGHT: 168 LBS | RESPIRATION RATE: 16 BRPM | OXYGEN SATURATION: 96 % | TEMPERATURE: 97.6 F | BODY MASS INDEX: 25.46 KG/M2

## 2023-04-26 DIAGNOSIS — J06.9 ACUTE UPPER RESPIRATORY INFECTION, UNSPECIFIED: ICD-10-CM

## 2023-04-26 PROCEDURE — 99214 OFFICE O/P EST MOD 30 MIN: CPT | Mod: 25

## 2023-04-26 PROCEDURE — 71046 X-RAY EXAM CHEST 2 VIEWS: CPT

## 2023-04-26 RX ORDER — PREDNISONE 10 MG/1
10 TABLET ORAL
Qty: 50 | Refills: 0 | Status: ACTIVE | COMMUNITY
Start: 2023-04-26 | End: 1900-01-01

## 2023-04-26 RX ORDER — PROMETHAZINE HYDROCHLORIDE AND DEXTROMETHORPHAN HYDROBROMIDE ORAL SOLUTION 15; 6.25 MG/5ML; MG/5ML
6.25-15 SOLUTION ORAL
Qty: 473 | Refills: 0 | Status: ACTIVE | COMMUNITY
Start: 2023-04-26 | End: 1900-01-01

## 2023-04-26 NOTE — PHYSICAL EXAM
[No Acute Distress] : no acute distress [Normal Oropharynx] : normal oropharynx [Normal Appearance] : normal appearance [No Neck Mass] : no neck mass [Normal Rate/Rhythm] : normal rate/rhythm [Normal S1, S2] : normal s1, s2 [No Murmurs] : no murmurs [No Resp Distress] : no resp distress [No Abnormalities] : no abnormalities [Benign] : benign [Normal Gait] : normal gait [No Clubbing] : no clubbing [No Cyanosis] : no cyanosis [No Edema] : no edema [FROM] : FROM [Normal Color/ Pigmentation] : normal color/ pigmentation [No Focal Deficits] : no focal deficits [Oriented x3] : oriented x3 [Normal Affect] : normal affect [III] : Mallampati Class: III [TextBox_68] : I:E 1:3; expiratory wheezes and Rhonchi

## 2023-04-26 NOTE — ASSESSMENT
[FreeTextEntry1] : **********************ALL PRINTED SCRIPTS TO BE FILLED THROUGH [facility] FUND*************************\par \par Mr. Corrales is a 57 year old male who has a history of asthma, allergy, GERD, cervicale spine Dz (s/p surgery 12/19)  and abnormal CT. He is s/p WTC exposure -  s/p complicated cervical spine surgery (12/2019) s/p COVID 19 12/2020- s/p THR (right) 5/2021- s/p COVID-19 vaccine reaction 12/2021 - +TBM right Bronchomalacia - s/p TBM surgery ([facility]oc) - post op VC issues/ dry cough- SOB / HORVATH Acute Bronchitis / Asthmatic Bronchitis \par \par \par problem 1: abnormal chest CT - c/w inflammation - ?COVID-19 residue (improved \par -PPD Quantiferon gold (-), ESR / Hypersensitivity panel (wnl)\par -Follow up Chest CT 3/2021 - 9/2021, 3/2022, 3/2023, next 6/2023\par \par problem 2: asthma (semi persistent) - (Active)\par -continue Breztri 2 puffs BID = Symbicort/Spiriva/Asmanex \par -Continue Asmanex 200 2 inhalations BID\par -continue to use Symbicort 160 at 2 inhalations BID\par -continue Spiriva at 2 inhalation QD \par -continue to use Singulair 10 mg QHS \par -continue to use Xopenex PRN / Xopenex 0.63 via nebulizer q6H prn\par -add Prednisone 20 mg x 7 days, 10 mg x 7 days (Trial: 4/6/2023) \par Information sheet given to the patient to be reviewed, this medication is never to be used without consulting the prescribing physician. Proper dietary restraint is necessary specifically salt containing foods, if any reaction may occur should be reported. \par -Asthma is  believed to be caused by inherited (genetic) and environmental factor, but its exact cause is unknown. Asthma may be triggered by allergens, lung infections, or irritants in the air. Asthma triggers are different for each person\par -Inhaler technique reviewed as well as oral hygiene techniques reviewed with patient. Avoidance of cold air, extremes of temperature, rescue inhaler should be used before exercise. Order of medication reviewed with patient. Recommended use of a cool mist humidifier in the bedroom.\par \par problem 2A: Biologic evaluation\par - Script given for blood work: asthma profile, food IgE panel, eosinophil level, IgE level, Vitamin D level \par -considering Dupixent (Prezant) 7/2023\par - The safety and efficacy of Nucala was established in three double-blind, randomized, placebo-controlled trials in patients with severe asthma. Compared to a placebo, patients with severe asthma receiving Nucala had fewer exacerbation requiring hospitalization and/or emergency department visits, and a longer time to first exacerbation. In addition, patients with severe asthma receiving Nucala or Fasenra experienced greater reductions in their daily maintenance oral corticosteroid dose, while maintaining asthma control compared with patients receiving placebo. Treatment with Nucala did not result in a significant improvement in lung function, as measured by the volume of air exhaled by patients in one second. The most common side effects include: headache, injection site reactions, back pain, weakness, and fatigue; hypersensitivity reactions can occur within hours or days including swelling of the face, mouth, and tongue, fainting, dizziness, hives, breathing problems, and rash; herpes zoster infections have occurred. The drug is a monoclonal antibody that inhibits interleukin-5 which helps regular eosinophils, a type of white blood cell that contributes to asthma. The over-production of eosinophils can cause inflammation in the lungs, increasing the frequency of asthma attacks. Patients must also take other medications, including high dose inhaled corticosteroids and at least one additional asthma drug. \par \par Problem 2B: Acute Bronchitis \par - Add Zithromax 500mg x 7 days \par -Rvp \par -add Prednisone 30 mg x 5 days, 20 mg x 5 days, 10 mg x 5 days \par Information sheet given to the patient to be reviewed, this medication is never to be used without consulting the prescribing physician. Proper dietary restraint is necessary specifically salt containing foods, if any reaction may occur should be reported. \par \par problem 3: cough; ? Sensory neuropathic cough (BM)\par -off Amitriptyline 10 mg up to TID, prn (DC if able)\par -Neurontin 100 Q8H \par -continue Promethazine DM prn\par Sensory neuropathic cough is an etiology of cough that is often realized once someone has been ruled out for common disease such as: asthma, COPD, eosinophilic bronchitis, bronchiectasis, post nasal drip, and GERD. It sometimes develops following a URI, herpes zoster outbreak in pharynx or thyroid or cervical spine injury. However, many patients have no identifiable antecedent explanation. \par \par Problem 3A: +TBM right Bronchomalacia (s/p surgery 2/2023)\par -s/p Dynamic CT- CTS eval Dr Alvin Stubbs\par -Tracheomalacia is usually acquired in adults and common causes include damage by tracheostomy or endotracheal intubation damaging the tracheal cartilage with increase risk with multiple intubations, prolonged intubation, and concurrent high dose steroid therapy; external chest wall trauma and surgery; chronic compression of the trachea by benign etiologies (eg, benign mediastinal goiter) or malignancy; relapsing polychondritis; or recurrent infection. Tracheomalacia can be asymptomatic, however signs or symptoms can develop as the severity of the airway narrowing progresses with major symptoms include dyspnea, cough, and sputum retention. Other symptoms include severe paroxysms of coughing, wheezing or stridor, barking cough and may be exacerbated by forced expiration, cough, and valsalva maneuver. Tracheomalacia is diagnosed by a bronchoscopic visualization of dynamic airway collapse on dynamic chest CT. Therapy is warranted in symptomatic patients with severe tracheomalacia and includes surgical repair as tracheobronchoplasty. The patient was referred to Dr. Alvin Stubbs or Dr. Vinny Lilly, at [facility] for a surgical consult. \par \par \par problem 4: allergic rhinitis \par -recommended to use "Navage" nasal rinse device \par -continue Claritin 10 mg QAM \par -continue to use Xyzal 5 mg QHS\par -continue to use Omnaris 1 sniff/nostril BID\par -continue Olopatadine 1 sniff each nostril BID (.6)\par -follow up with Dr. Ok Islas  - s/p turbinectomy \par -Environmental measures for allergies were encouraged including mattress and pillow cover, air purifier, and environmental controls.\par \par Problem 4A: Laryngospasm\par -Speech Rx\par -transition bethanechol 5 mg every 8 hours to Baclofen 10 mg premeal, QHS \par \par Problem 4B: VC dysfunction\par -Diblasi / Dawson ( [facility]) \par -add Aerobika \par \par problem 5: GERD (FeeSST- c/w LPR)\par -continue to use Dexilant 60 mg before first meal\par -continue to use Pepcid 40 mg QHS \par -Rule of 2s: avoid eating too much, eating too late, eating too spicy, eating two hours before bed\par -Things to avoid including overeating, spicy foods, tight clothing, eating within three hours of bed, this list is not all inclusive. \par -For treatment of reflux, possible options discussed including diet control, H2 blockers, PPIs, as well as coating motility agents discussed as treatment options. Timing of meals and proximity of last meal to sleep were discussed. If symptoms persist, a formal gastrointestinal evaluation is needed. \par \par problem 6: insomnia\par - medical marijuana\par -continue to use Ambien PRN \par -recommended good sleep hygiene\par -Good sleep hygiene was encouraged including avoiding watching television an hour before bed, keeping caffeine at a low,  avoiding reading, television, or anything, in bed, no drinking any liquids three hours before bedtime, and only getting into bed when tired and ready for sleep.\par \par problem 7: foot cramps (resolved)\par -continue to use MyoCalm PM\par \par Problem 8a: s/p COVID 19 infection 12/2020 (residual Sx) \par -Recommended not to get an additional COVID-19 booster at this time until it is updated against the current variants. If planning on travelling, obtaining another booster within 2 weeks of departure is recommended. \par -Covid 19 preclusions, vaccine and booster discussed at length and recommended \par - S/p Covid 19 vaccine (Moderna) x3 (12/2021)\par -s/p MAB infusion \par -educated patient on COVID 19 vaccine and appropriate recommendations \par -vaccine pending\par COVID-19 precautionary Immune Support Recommendations:\par -OTC Vitamin C 1000mg BID \par -OTC Quercetin 500mg BID \par -OTC Zinc 50 mg per day \par -OTC Melatonin 5 mg a night \par -OTC Vitamin D 2000mg per day \par -OTC Tonic Water 8oz per day\par -Water 0.5-1 gallon per day\par Additional Support Supplements: \par -Liposomal Glutathione 500 mg BID\par -SPM 1 tablet BID \par -Berberine 1000 mg BID  \par - mg BID \par \par problem 8: health maintenance\par -PPD negative as of 9/20/2021\par -received flu shot in - 9/2021\par -recommended strep pneumonia vaccines: Prevnar-13 vaccine, followed by Pneumo vaccine 23 on year following\par -recommended early intervention for URIs\par -recommended osteoporosis evaluations\par -recommended early dermatological evaluations\par -recommended after the age of 50 to the age of 70, colonoscopy every 5 years\par -encouraged early intervention\par \par \par F/U in 4 months\par He is encouraged to call with any changes, concerns, or questions. \par \par ***********************ALL PRINTED SCRIPTS TO BE FILLED THROUGH [facility] FUND************************************

## 2023-04-26 NOTE — HISTORY OF PRESENT ILLNESS
ED Provider Note    Scribed for Kahlil Ridley M.D. by Mary Knox. 8/17/2022  3:14 PM    Primary care provider: Regina Barraza D.O.  Means of arrival: Walk-in  History obtained from: Patient  History limited by: None    CHIEF COMPLAINT  Chief Complaint   Patient presents with    Leg Pain     Intermittent left leg pain just above ankle since 1130. No injury. Increasing in frequency and severity since onset.       HPI  Tania Worley is a 58 y.o. female who presents to the Emergency Department for further evaluation of sharp intermittent left lower leg pain onset 4 hours ago. She describes that the pain radiates down to her foot or up to her left buttocks. The patient states that last week, she had a severe muscle cramp in her upper back and also intermittent cramps in her left leg. The patient states the pain was not there at all yesterday. No alleviating or exacerbating factors. The patient denies any new swelling in her legs, urinary or bowel changes. She denies any recent injuries. She states she has a history of kidney stones. She states that 3 weeks ago she had sinus surgery. She denies any pain in the back of her left leg. She states she sprained her ankles in April. She denies taking blood thinners or hormones. She states that she does not have a  currently for this visit. She denies taking pain medications today.     REVIEW OF SYSTEMS  Pertinent positives include: left leg pain, left leg cramps, upper back cramps. Pertinent negatives include: urinary/bowel changes, swelling in the legs. See history of present illness. All other systems are negative.     PAST MEDICAL HISTORY   has a past medical history of Abdominal pain, Back pain, Cataract, Daytime sleepiness, Depression, Diarrhea, Difficulty swallowing, GERD (gastroesophageal reflux disease), Heartburn, Hiatus hernia syndrome, Insomnia, Kidney stone, Nausea, Neck pain (07/18/2022), Painful joint, PONV (postoperative nausea and  "vomiting), Rhinitis, Sleep apnea, Sore muscles, and Wears glasses.    SURGICAL HISTORY   has a past surgical history that includes sinuscope (2018); appendectomy; cholecystectomy; hysteroscopy thermal ablation (2015); endoscopy, paranasal sinus, with total ethmoidectomy, ss (Bilateral, 7/22/2022); and turbinoplasty (Bilateral, 7/22/2022).    SOCIAL HISTORY  Social History     Tobacco Use    Smoking status: Never    Smokeless tobacco: Never   Vaping Use    Vaping Use: Never used   Substance Use Topics    Alcohol use: Not Currently     Comment: RARE    Drug use: Never      Social History     Substance and Sexual Activity   Drug Use Never       FAMILY HISTORY  Family History   Problem Relation Age of Onset    Kidney Disease Father        CURRENT MEDICATIONS  Home Medications       Reviewed by Jacquelyn Estevez (Pharmacy Tech) on 08/17/22 at 1440  Med List Status: Complete     Medication Last Dose Status   azithromycin (ZITHROMAX) 250 MG Tab 7/26/2022 Active   cholecalciferol (D3 5000) 5000 UNIT Cap 8/14/2022 Active   Cholecalciferol (VITAMIN D3) 2000 UNIT Cap > 2 days Active   escitalopram (LEXAPRO) 10 MG Tab 8/16/2022 Active   famotidine (PEPCID) 20 MG Tab 8/17/2022 Active   multivitamin (THERAGRAN) Tab 8/14/2022 Active   NAPROXEN PO 8/12/2022 Active   zolpidem (AMBIEN) 5 MG Tab 8/16/2022 Active                    ALLERGIES  Allergies   Allergen Reactions    Amoxicillin Anaphylaxis, Rash and Shortness of Breath    Penicillin G Anaphylaxis and Rash     .    Penicillin G Benzathine Anaphylaxis       PHYSICAL EXAM  VITAL SIGNS: /80   Pulse 96   Temp 36.6 °C (97.9 °F) (Temporal)   Resp 16   Ht 1.727 m (5' 8\")   Wt 91 kg (200 lb 9.9 oz)   SpO2 94%   BMI 30.50 kg/m²     Constitutional: Alert in no apparent distress.  HENT: No signs of trauma, Bilateral external ears normal, Nose normal. Uvula midline.   Eyes: Pupils are equal and reactive, Conjunctiva normal, Non-icteric.   Neck: Normal range of motion, No " [FreeTextEntry1] : Mr. Corrales is a 57 year old male with a history of allergic rhinitis, asthma, abnormal chest CT, and GERD presenting to the office today for a follow up visit. His chief complaint is\par \par -he notes past week he has lost 10 lbs\par -he notes he was having chills\par -he notes wheezing and coughing a lot \par -he notes he is coughing a lot but not bringing up anything \par -he notes usage of inhalers \par -he notes he doesn’t have an appetite \par -he notes he has just been drinking milk \par \par -He denies any visual issues, headaches, nausea, vomiting, fever, chills, sweats, chest pains, chest pressure, diarrhea, constipation, dysphagia, myalgia, dizziness, leg swelling, leg pain, itchy eyes, itchy ears, heartburn, reflux, or sour taste in the mouth.  tenderness, Supple, No stridor.   Lymphatic: No lymphadenopathy noted.   Cardiovascular: Regular rate and rhythm, no murmurs.   Thorax & Lungs: Normal breath sounds, No respiratory distress, No wheezing, No chest tenderness.   Abdomen:  Soft, No tenderness, No peritoneal signs, No masses, No pulsatile masses.   Skin: Warm, Dry, No erythema, No rash.   Back: No bony tenderness, No CVA tenderness.   Extremities: Intact distal pulses, No edema, No tenderness, No cyanosis.  Musculoskeletal: Good range of motion in all major joints. No tenderness to palpation or major deformities noted.  5 out of 5 strength with intact dorsiflexion and plantarflexion.  Tenderness palpation of calf.  Negative Scales test.  No cellulitis or erythema present.  Full range of motion of knee.  2+ peripheral pulses.  Sensation intact to light touch distally.  Neurologic: Alert , Normal motor function, Normal sensory function, No focal deficits noted.   Psychiatric: Affect normal, Judgment normal, Mood normal.     DIAGNOSTIC STUDIES / PROCEDURES    RADIOLOGY  DX-ANKLE 3+ VIEWS LEFT   Final Result      No radiographic evidence of acute traumatic injury or bony erosion.      US-EXTREMITY VENOUS LOWER UNILAT LEFT    (Results Pending)     The radiologist's interpretation of all radiological studies have been reviewed by me.    COURSE & MEDICAL DECISION MAKING  Nursing notes, VS, PMSFHx reviewed in chart.    58 y.o. female p/w chief complaint of left lower leg pain.    3:14 PM Patient seen and examined at bedside.      I verified that the patient was wearing a mask and I was wearing appropriate PPE every time I entered the room. The patient's mask was on the patient at all times during my encounter except for a brief view of the oropharynx.     The differential diagnoses include but are not limited to:       # Left lower extremity distal pain, Intermittent cramping of the entire left leg  The patient had sinus surgery 3 weeks ago   Preliminary  ultrasound read with no obvious DVT  X-ray with no large occult fracture or tumor  Patient prescribed gabapentin for neuropathic pain and agrees to follow-up with her primary care physician within the next several days for repeat ultrasound if pain persist.    6:31 PM Patient was reevaluated at bedside. Discussed radiology  results with the patient and informed them of my plan for discharge. I informed them that they do not have a blood clot or infection. I informed the patient that I will send her home with Gabapentin. Patient verbalizes understanding and agreement to this plan of care.      The patient will return for new or worsening symptoms and is stable at the time of discharge.    DISPOSITION:  Patient will be discharged home in stable condition.    FOLLOW UP:  Regina Barraza D.O.  [address]  [address]  [facility] [address] [phone]    In 3 days      [facility], Emergency Dept  [address]  [facility] [address] [phone]    If symptoms worsen    OUTPATIENT MEDICATIONS:  New Prescriptions    GABAPENTIN (NEURONTIN) 100 MG CAP    Take 1 Capsule by mouth 3 times a day for 20 days.        FINAL IMPRESSION  1. Left leg pain          Mary MELENDEZ (Kristina), am scribing for, and in the presence of, Kahlil Ridley M.D..    Electronically signed by: Mary Knox (Kristina), 8/17/2022    Kahlil MELENDEZ M.D. personally performed the services described in this documentation, as scribed by Mary Knox in my presence, and it is both accurate and complete.    The note accurately reflects work and decisions made by me.  Kahlil Ridley M.D.  8/17/2022  6:54 PM

## 2023-04-26 NOTE — PROCEDURE
[FreeTextEntry1] : CXR reveals normal sized heart; there was no evidence of infiltrate or effusion -- A normal chest radiograph.

## 2023-04-26 NOTE — ADDENDUM
[FreeTextEntry1] : Documented by La Gonsalves as a scribe for Dr. Justice Lama on 04/26/2023   \par \par All medical record entries made by the Scribe were at my, Dr. Justice Lama's, direction and personally dictated by me on 04/26/2023 . I have reviewed the chart and agree that the record accurately reflects my personal performance of the history, physical exam, assessment and plan. I have also personally directed, reviewed, and agree with the discharge instructions.

## 2023-05-01 ENCOUNTER — TRANSCRIPTION ENCOUNTER (OUTPATIENT)
Age: 58
End: 2023-05-01

## 2023-05-04 ENCOUNTER — NON-APPOINTMENT (OUTPATIENT)
Age: 58
End: 2023-05-04

## 2023-05-04 LAB
HPIV3 RNA SPEC QL NAA+PROBE: DETECTED
RAPID RVP RESULT: DETECTED
SARS-COV-2 RNA PNL RESP NAA+PROBE: NOT DETECTED

## 2023-05-26 ENCOUNTER — APPOINTMENT (OUTPATIENT)
Dept: PULMONOLOGY | Facility: CLINIC | Age: 58
End: 2023-05-26

## 2023-06-27 ENCOUNTER — APPOINTMENT (OUTPATIENT)
Dept: PULMONOLOGY | Facility: CLINIC | Age: 58
End: 2023-06-27
Payer: COMMERCIAL

## 2023-06-27 VITALS
DIASTOLIC BLOOD PRESSURE: 60 MMHG | HEART RATE: 80 BPM | WEIGHT: 177 LBS | HEIGHT: 68 IN | SYSTOLIC BLOOD PRESSURE: 110 MMHG | BODY MASS INDEX: 26.83 KG/M2 | TEMPERATURE: 97.8 F | RESPIRATION RATE: 16 BRPM | OXYGEN SATURATION: 97 %

## 2023-06-27 DIAGNOSIS — J39.8 OTHER SPECIFIED DISEASES OF UPPER RESPIRATORY TRACT: ICD-10-CM

## 2023-06-27 DIAGNOSIS — J45.909 UNSPECIFIED ASTHMA, UNCOMPLICATED: ICD-10-CM

## 2023-06-27 PROCEDURE — 94727 GAS DIL/WSHOT DETER LNG VOL: CPT

## 2023-06-27 PROCEDURE — 94010 BREATHING CAPACITY TEST: CPT

## 2023-06-27 PROCEDURE — 99214 OFFICE O/P EST MOD 30 MIN: CPT | Mod: 25

## 2023-06-27 PROCEDURE — 94729 DIFFUSING CAPACITY: CPT

## 2023-06-27 NOTE — ADDENDUM
[FreeTextEntry1] : Documented by Merissa Stoner acting as a scribe for Dr. Justice Lama on 06/27/2023. All medical record entries made by the Scribe were at my, Dr. Justice Lama's, direction and personally dictated by me on 06/27/2023. I have reviewed the chart and agree that the record accurately reflects my personal performance of the history, physical exam, assessment and plan. I have also personally directed, reviewed, and agree with the discharge instructions.\par \par

## 2023-06-27 NOTE — ASSESSMENT
[FreeTextEntry1] : **********************ALL PRINTED SCRIPTS TO BE FILLED THROUGH [facility] FUND*************************\par \par Mr. Corrales is a 57 year old male who has a history of asthma, allergy, GERD, cervicale spine Dz (s/p surgery 12/19)  and abnormal CT. He is s/p WTC exposure -  s/p complicated cervical spine surgery (12/2019) s/p COVID 19 12/2020- s/p THR (right) 5/2021- s/p COVID-19 vaccine reaction 12/2021 - +TBM right Bronchomalacia - s/p TBM surgery ([facility]) - post op VC issues/ dry cough- SOB / HORVATH Acute Bronchitis / Asthmatic Bronchitis (resolved)-but vocal issues/periodic cough and asthma flair\par \par \par problem 1: abnormal chest CT - c/w inflammation - ?COVID-19 residue (improved \par -PPD Quantiferon gold (-), ESR / Hypersensitivity panel (wnl)\par -Follow up Chest CT 3/2021 - 9/2021, 3/2022, 3/2023, next 6/2023 (due)\par \par problem 2: asthma (semi persistent) - (Active)\par -continue Breztri 2 puffs BID = Symbicort/Spiriva/Asmanex \par -Continue Asmanex 200 2 inhalations BID\par -continue to use Symbicort 160 at 2 inhalations BID\par -continue Spiriva at 2 inhalation QD \par -continue to use Singulair 10 mg QHS \par -continue to use Xopenex PRN / Xopenex 0.63 via nebulizer q6H prn\par -s/p Prednisone 20 mg x 7 days, 10 mg x 7 days (Trial: 4/6/2023) \par Information sheet given to the patient to be reviewed, this medication is never to be used without consulting the prescribing physician. Proper dietary restraint is necessary specifically salt containing foods, if any reaction may occur should be reported. \par -Asthma is  believed to be caused by inherited (genetic) and environmental factor, but its exact cause is unknown. Asthma may be triggered by allergens, lung infections, or irritants in the air. Asthma triggers are different for each person\par -Inhaler technique reviewed as well as oral hygiene techniques reviewed with patient. Avoidance of cold air, extremes of temperature, rescue inhaler should be used before exercise. Order of medication reviewed with patient. Recommended use of a cool mist humidifier in the bedroom.\par \par problem 2A: Biologic evaluation\par - Script given for blood work: asthma profile, food IgE panel, eosinophil level, IgE level, Vitamin D level \par -considering Dupixent (Prezant) 7/2023\par - The safety and efficacy of Nucala was established in three double-blind, randomized, placebo-controlled trials in patients with severe asthma. Compared to a placebo, patients with severe asthma receiving Nucala had fewer exacerbation requiring hospitalization and/or emergency department visits, and a longer time to first exacerbation. In addition, patients with severe asthma receiving Nucala or Fasenra experienced greater reductions in their daily maintenance oral corticosteroid dose, while maintaining asthma control compared with patients receiving placebo. Treatment with Nucala did not result in a significant improvement in lung function, as measured by the volume of air exhaled by patients in one second. The most common side effects include: headache, injection site reactions, back pain, weakness, and fatigue; hypersensitivity reactions can occur within hours or days including swelling of the face, mouth, and tongue, fainting, dizziness, hives, breathing problems, and rash; herpes zoster infections have occurred. The drug is a monoclonal antibody that inhibits interleukin-5 which helps regular eosinophils, a type of white blood cell that contributes to asthma. The over-production of eosinophils can cause inflammation in the lungs, increasing the frequency of asthma attacks. Patients must also take other medications, including high dose inhaled corticosteroids and at least one additional asthma drug. \par \par problem 3: cough; ? Sensory neuropathic cough (BM)\par -off Amitriptyline 10 mg up to TID, prn (DC if able)\par -Neurontin 100 Q8H \par -continue Promethazine DM prn\par Sensory neuropathic cough is an etiology of cough that is often realized once someone has been ruled out for common disease such as: asthma, COPD, eosinophilic bronchitis, bronchiectasis, post nasal drip, and GERD. It sometimes develops following a URI, herpes zoster outbreak in pharynx or thyroid or cervical spine injury. However, many patients have no identifiable antecedent explanation. \par \par Problem 3A: +TBM right Bronchomalacia (s/p surgery 2/2023)\par -s/p Dynamic CT- CTS eval Dr Alvin Stubbs\par -Tracheomalacia is usually acquired in adults and common causes include damage by tracheostomy or endotracheal intubation damaging the tracheal cartilage with increase risk with multiple intubations, prolonged intubation, and concurrent high dose steroid therapy; external chest wall trauma and surgery; chronic compression of the trachea by benign etiologies (eg, benign mediastinal goiter) or malignancy; relapsing polychondritis; or recurrent infection. Tracheomalacia can be asymptomatic, however signs or symptoms can develop as the severity of the airway narrowing progresses with major symptoms include dyspnea, cough, and sputum retention. Other symptoms include severe paroxysms of coughing, wheezing or stridor, barking cough and may be exacerbated by forced expiration, cough, and valsalva maneuver. Tracheomalacia is diagnosed by a bronchoscopic visualization of dynamic airway collapse on dynamic chest CT. Therapy is warranted in symptomatic patients with severe tracheomalacia and includes surgical repair as tracheobronchoplasty. The patient was referred to Dr. Alvin Stubbs or Dr. Vinny Lilly, at [facility] for a surgical consult. \par \par \par problem 4: allergic rhinitis \par -recommended to use "Navage" nasal rinse device \par -continue Claritin 10 mg QAM \par -continue to use Xyzal 5 mg QHS\par -continue to use Omnaris 1 sniff/nostril BID\par -continue Olopatadine 1 sniff each nostril BID (.6)\par -follow up with Dr. Ok Islas  - s/p turbinectomy \par -Environmental measures for allergies were encouraged including mattress and pillow cover, air purifier, and environmental controls.\par \par Problem 4A: Laryngospasm\par -Speech Rx\par -?Botox Rx (pending gHI)\par -transition bethanechol 5 mg every 8 hours to Baclofen 10 mg premeal, QHS \par \par Problem 4B: VC dysfunction\par -Diblasi / Dawson ( [place]) \par -add Aerobika \par \par problem 5: GERD (FeeSST- c/w LPR)\par -continue to use Dexilant 60 mg before first meal\par -continue to use Pepcid 40 mg QHS \par -Rule of 2s: avoid eating too much, eating too late, eating too spicy, eating two hours before bed\par -Things to avoid including overeating, spicy foods, tight clothing, eating within three hours of bed, this list is not all inclusive. \par -For treatment of reflux, possible options discussed including diet control, H2 blockers, PPIs, as well as coating motility agents discussed as treatment options. Timing of meals and proximity of last meal to sleep were discussed. If symptoms persist, a formal gastrointestinal evaluation is needed. \par \par problem 6: insomnia\par - medical marijuana\par -continue to use Ambien PRN \par -recommended good sleep hygiene\par -Good sleep hygiene was encouraged including avoiding watching television an hour before bed, keeping caffeine at a low,  avoiding reading, television, or anything, in bed, no drinking any liquids three hours before bedtime, and only getting into bed when tired and ready for sleep.\par \par problem 7: foot cramps (resolved)\par -continue to use MyoCalm PM\par \par Problem 8a: s/p COVID 19 infection 12/2020 (residual Sx) \par -Recommended not to get an additional COVID-19 booster at this time until it is updated against the current variants. If planning on travelling, obtaining another booster within 2 weeks of departure is recommended. \par -Covid 19 preclusions, vaccine and booster discussed at length and recommended \par - S/p Covid 19 vaccine (Moderna) x3 (12/2021)\par -s/p MAB infusion \par -educated patient on COVID 19 vaccine and appropriate recommendations \par -vaccine pending\par COVID-19 precautionary Immune Support Recommendations:\par -OTC Vitamin C 1000mg BID \par -OTC Quercetin 500mg BID \par -OTC Zinc 50 mg per day \par -OTC Melatonin 5 mg a night \par -OTC Vitamin D 2000mg per day \par -OTC Tonic Water 8oz per day\par -Water 0.5-1 gallon per day\par Additional Support Supplements: \par -Liposomal Glutathione 500 mg BID\par -SPM 1 tablet BID \par -Berberine 1000 mg BID  \par - mg BID \par \par problem 8: health maintenance\par -PPD negative as of 9/20/2021\par -received flu shot in - 9/2021\par -recommended strep pneumonia vaccines: Prevnar-13 vaccine, followed by Pneumo vaccine 23 on year following\par -recommended early intervention for URIs\par -recommended osteoporosis evaluations\par -recommended early dermatological evaluations\par -recommended after the age of 50 to the age of 70, colonoscopy every 5 years\par -encouraged early intervention\par \par \par F/U in 4 months\par He is encouraged to call with any changes, concerns, or questions. \par \par ***********************ALL PRINTED SCRIPTS TO BE FILLED THROUGH [facility] FUND************************************
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
## 2023-06-27 NOTE — PROCEDURE
[FreeTextEntry1] : Full PFT reveals normal flows; FEV1 was 2.64L which is 80% of predicted; normal lung volumes; normal diffusion at 30.6, which is 137% of predicted; normal flow volume loop. PFTs were performed to evaluate for SOB, asthma, TBM\par \par FENO was unable; a normal value being less than 25\par Fractional exhaled nitric oxide (FENO) is regarded as a simple, noninvasive method for assessing eosinophilic airway inflammation. Produced by a variety of cells within the lung, nitric oxide (NO) concentrations are generally low in healthy individuals. However, high concentrations of NO appear to be involved in nonspecific host defense mechanisms and chronic inflammatory diseases such as asthma. The American Thoracic Society (ATS) therefore has recommended using FENO to aid in the diagnosis and monitoring of eosinophilic airway inflammation and asthma, and for identifying steroid responsive individuals whose chronic respiratory symptoms may be caused by airway inflammation.

## 2023-06-29 ENCOUNTER — RESULT REVIEW (OUTPATIENT)
Age: 58
End: 2023-06-29

## 2023-06-29 ENCOUNTER — APPOINTMENT (OUTPATIENT)
Dept: THORACIC SURGERY | Facility: CLINIC | Age: 58
End: 2023-06-29
Payer: COMMERCIAL

## 2023-06-29 ENCOUNTER — APPOINTMENT (OUTPATIENT)
Dept: RADIOLOGY | Facility: HOSPITAL | Age: 58
End: 2023-06-29

## 2023-06-29 ENCOUNTER — OUTPATIENT (OUTPATIENT)
Dept: OUTPATIENT SERVICES | Facility: HOSPITAL | Age: 58
LOS: 1 days | End: 2023-06-29
Payer: COMMERCIAL

## 2023-06-29 VITALS
HEIGHT: 68 IN | HEART RATE: 68 BPM | RESPIRATION RATE: 16 BRPM | DIASTOLIC BLOOD PRESSURE: 72 MMHG | BODY MASS INDEX: 26.67 KG/M2 | OXYGEN SATURATION: 97 % | SYSTOLIC BLOOD PRESSURE: 102 MMHG | WEIGHT: 176 LBS

## 2023-06-29 DIAGNOSIS — Z98.890 OTHER SPECIFIED POSTPROCEDURAL STATES: Chronic | ICD-10-CM

## 2023-06-29 DIAGNOSIS — Z98.1 ARTHRODESIS STATUS: Chronic | ICD-10-CM

## 2023-06-29 DIAGNOSIS — Z96.641 PRESENCE OF RIGHT ARTIFICIAL HIP JOINT: Chronic | ICD-10-CM

## 2023-06-29 DIAGNOSIS — Z98.84 BARIATRIC SURGERY STATUS: Chronic | ICD-10-CM

## 2023-06-29 DIAGNOSIS — R05.9 COUGH, UNSPECIFIED: ICD-10-CM

## 2023-06-29 DIAGNOSIS — J39.8 OTHER SPECIFIED DISEASES OF UPPER RESPIRATORY TRACT: ICD-10-CM

## 2023-06-29 DIAGNOSIS — Z90.49 ACQUIRED ABSENCE OF OTHER SPECIFIED PARTS OF DIGESTIVE TRACT: Chronic | ICD-10-CM

## 2023-06-29 PROCEDURE — 71046 X-RAY EXAM CHEST 2 VIEWS: CPT | Mod: 26

## 2023-06-29 PROCEDURE — 99214 OFFICE O/P EST MOD 30 MIN: CPT

## 2023-06-30 PROBLEM — R05.9 COUGH: Status: ACTIVE | Noted: 2020-12-22
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
## 2023-06-30 NOTE — CONSULT LETTER
[Dear  ___] : Dear  [unfilled], [Consult Letter:] : I had the pleasure of evaluating your patient, [unfilled]. [( Thank you for referring [unfilled] for consultation for _____ )] : Thank you for referring [unfilled] for consultation for [unfilled] [Please see my note below.] : Please see my note below. [Consult Closing:] : Thank you very much for allowing me to participate in the care of this patient.  If you have any questions, please do not hesitate to contact me. [Sincerely,] : Sincerely, [FreeTextEntry2] : Dr. Justice Lama (Pulm/Referring) [FreeTextEntry3] : Alvin Stubbs MD, MPH \par System Director of Thoracic Surgery \par Director of Comprehensive Lung and Foregut [place] \par Professor Cardiovascular & Thoracic Surgery  \par [employer] [employer] at [facility]\par \par [facility]\par [address]\par Oncology [address]\[place] [address]\par Tel: [phone]\par Fax: [phone]\par

## 2023-06-30 NOTE — PHYSICAL EXAM
[] : no respiratory distress [Respiration, Rhythm And Depth] : normal respiratory rhythm and effort [Exaggerated Use Of Accessory Muscles For Inspiration] : no accessory muscle use [Auscultation Breath Sounds / Voice Sounds] : lungs were clear to auscultation bilaterally [Examination Of The Chest] : the chest was normal in appearance [Chest Visual Inspection Thoracic Asymmetry] : no chest asymmetry [Diminished Respiratory Excursion] : normal chest expansion [2+] : left 2+ [Breast Appearance] : normal in appearance [Breast Palpation Mass] : no palpable masses [Bowel Sounds] : normal bowel sounds [Abdomen Soft] : soft [Abdomen Tenderness] : non-tender [Cervical Lymph Nodes Enlarged Posterior Bilaterally] : posterior cervical [Cervical Lymph Nodes Enlarged Anterior Bilaterally] : anterior cervical [Supraclavicular Lymph Nodes Enlarged Bilaterally] : supraclavicular [No CVA Tenderness] : no ~M costovertebral angle tenderness [No Spinal Tenderness] : no spinal tenderness [Involuntary Movements] : no involuntary movements were seen [Skin Color & Pigmentation] : normal skin color and pigmentation [No Focal Deficits] : no focal deficits [Oriented To Time, Place, And Person] : oriented to person, place, and time

## 2023-06-30 NOTE — ASSESSMENT
[FreeTextEntry1] : Mr. 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 Justice Lama. \par \par Now s/p awake bronchoscopy on 1/12/23. Upon deep exhalation, I was able to appreciate approximately 70% posterior collapse of the distal trachea and 70% collapse of the right mainstem bronchus. There was approximately 60% collapse of the left mainstem bronchus.\par \par Now s/p a Flex Bronch, Rt VATS R.A. MLND, tracheobronchoplasty on 2/14/23. Path of Levels 11, 7, and 10 LNs all reactive.\par \par CXR on 6/29/23: \par - Cardiomediastinal silhouette is within normal limits.\par - Lungs are clear; No pleural effusion or pneumothorax.\par - No acute bony findings; Cervical fusion.\par - S/P gastric bypass surgery with gastric lap band present. S/P cholecystectomy.\par \par I have reviewed the patient's medical records and diagnostic images at time of this office consultation and have made the following recommendation:\par 1. Patient experiencing some voice hoarseness,following with Otolaryngology. Wheezing has gotten better since surgery. Discussed returning to clinic in 6 months for clinical re-evaluation. \par 2. Patient unable to perform work duties due to respiratory function. Requesting an office note explaining condition. Office will provide. \par \par Recommendations reviewed with patient during this office visit, and all questions answered; Patient instructed on the importance of follow up and verbalizes understanding.\par \par I, ANGELIA George, personally performed the evaluation and management (E/M) services for this established patient. That E/M includes conducting the examination, assessing all new/exacerbated conditions, and establishing a new plan of care. Today, My ACP, Jayda Valero, was here to observe my evaluation and management services for this patient to be followed going forward.\par \par \par \par \par

## 2023-07-24 NOTE — ASU PREOP CHECKLIST - VERIFY SURGICAL SITE/SIDE WITH PATIENT
Let mom Joycelyn know Melanie is due for a physical, I have refilled the OCP for 3 months but sometime during this time if they could make an appointment I would be able to do a year of refills.   done/I&D lumbar region

## 2023-08-03 NOTE — PROGRESS NOTE ADULT - PROVIDER SPECIALTY LIST ADULT
Neurosurgery Referral for macromastia.     Per chart review patient is being scheduled with plastic surgery to discuss breast reduction. See telephone encounter dated 8/2/2023.

## 2023-08-17 ENCOUNTER — APPOINTMENT (OUTPATIENT)
Dept: CT IMAGING | Facility: CLINIC | Age: 58
End: 2023-08-17

## 2023-08-17 ENCOUNTER — OUTPATIENT (OUTPATIENT)
Dept: OUTPATIENT SERVICES | Facility: HOSPITAL | Age: 58
LOS: 1 days | End: 2023-08-17
Payer: COMMERCIAL

## 2023-08-17 DIAGNOSIS — Z98.890 OTHER SPECIFIED POSTPROCEDURAL STATES: Chronic | ICD-10-CM

## 2023-08-17 DIAGNOSIS — Z98.84 BARIATRIC SURGERY STATUS: Chronic | ICD-10-CM

## 2023-08-17 DIAGNOSIS — Z98.1 ARTHRODESIS STATUS: Chronic | ICD-10-CM

## 2023-08-17 DIAGNOSIS — Z90.49 ACQUIRED ABSENCE OF OTHER SPECIFIED PARTS OF DIGESTIVE TRACT: Chronic | ICD-10-CM

## 2023-08-17 DIAGNOSIS — Z96.641 PRESENCE OF RIGHT ARTIFICIAL HIP JOINT: Chronic | ICD-10-CM

## 2023-08-17 DIAGNOSIS — R93.89 ABNORMAL FINDINGS ON DIAGNOSTIC IMAGING OF OTHER SPECIFIED BODY STRUCTURES: ICD-10-CM

## 2023-08-17 PROCEDURE — 71250 CT THORAX DX C-: CPT

## 2023-08-17 PROCEDURE — 71250 CT THORAX DX C-: CPT | Mod: 26

## 2023-09-07 ENCOUNTER — APPOINTMENT (OUTPATIENT)
Dept: PULMONOLOGY | Facility: CLINIC | Age: 58
End: 2023-09-07
Payer: COMMERCIAL

## 2023-09-07 VITALS
TEMPERATURE: 97.1 F | RESPIRATION RATE: 16 BRPM | BODY MASS INDEX: 26.98 KG/M2 | SYSTOLIC BLOOD PRESSURE: 124 MMHG | DIASTOLIC BLOOD PRESSURE: 72 MMHG | HEIGHT: 68 IN | OXYGEN SATURATION: 98 % | WEIGHT: 178 LBS | HEART RATE: 88 BPM

## 2023-09-07 PROCEDURE — 94010 BREATHING CAPACITY TEST: CPT

## 2023-09-07 PROCEDURE — 94729 DIFFUSING CAPACITY: CPT

## 2023-09-07 PROCEDURE — 94727 GAS DIL/WSHOT DETER LNG VOL: CPT

## 2023-09-07 PROCEDURE — 99214 OFFICE O/P EST MOD 30 MIN: CPT | Mod: 25

## 2023-09-07 PROCEDURE — 95012 NITRIC OXIDE EXP GAS DETER: CPT

## 2023-09-07 NOTE — HISTORY OF PRESENT ILLNESS
[FreeTextEntry1] : Mr. Corrales is a 57 year old male with a history of allergic rhinitis, asthma, abnormal chest CT, and GERD presenting to the office today for a follow up visit. His chief complaint is  - he notes it is very hard for him to take a full breath in due to the climate  - he notes taking Trelegy  - he notes getting SOB easily - he notes coughing a lot after exertion and after using his Lidocaine nebulizer - he notes his cough is dry and hacking  - he notes his colonoscopy revealed a pocket of diverticulosis - he notes not sleeping well and wakes up in the middle of the night multiple times - he notes he is in speech therapy but he isn't seeing any results - he notes seeing Dr. Islas for his vocal cords and recommended Botox injections - he notes he is still waiting to get approved for Dupixent  -he denies any headaches, nausea, emesis, fever, chills, sweats, chest pain, chest pressure, wheezing, palpitations, constipation, diarrhea, vertigo, dysphagia, heartburn, reflux, itchy eyes, itchy ears, leg swelling, leg pain, arthralgias, myalgias, or sour taste in the mouth.

## 2023-09-07 NOTE — PROCEDURE
[FreeTextEntry1] : Full PFT reveals normal flows; FEV1 was 3.19 L which is 97% of predicted; normal lung volumes; normal diffusion at 28.3, which is 123% of predicted; abnormal inspiratory limb. PFTs were performed to evaluate for SOB  FENO was 13; a normal value being less than 25 Fractional exhaled nitric oxide (FENO) is regarded as a simple, noninvasive method for assessing eosinophilic airway inflammation. Produced by a variety of cells within the lung, nitric oxide (NO) concentrations are generally low in healthy individuals. However, high concentrations of NO appear to be involved in nonspecific host defense mechanisms and chronic inflammatory diseases such as asthma. The American Thoracic Society (ATS) therefore has recommended using FENO to aid in the diagnosis and monitoring of eosinophilic airway inflammation and asthma, and for identifying steroid responsive individuals whose chronic respiratory symptoms may be caused by airway inflammation.

## 2023-09-07 NOTE — ADDENDUM
[FreeTextEntry1] : Documented by Ronaldo Morrow acting as a scribe for Dr. Justice Lama on 09/07/2023.   All medical record entries made by the Scribe were at my, Dr. Justice Lama's, direction and personally dictated by me on 09/07/2023. I have reviewed the chart and agree that the record accurately reflects my personal performance of the history, physical exam, assessment and plan. I have also personally directed, reviewed, and agree with the discharge instructions.

## 2023-09-07 NOTE — ASSESSMENT
[FreeTextEntry1] : **********************ALL PRINTED SCRIPTS TO BE FILLED THROUGH [facility]*************************  Mr. Corrales is a 57 year old male who has a history of asthma, allergy, GERD, cervicale spine Dz (s/p surgery 12/19)  and abnormal CT. He is s/p WTC exposure -  s/p complicated cervical spine surgery (12/2019) s/p COVID 19 12/2020- s/p THR (right) 5/2021- s/p COVID-19 vaccine reaction 12/2021 - +TBM right Bronchomalacia - s/p TBM surgery (Enoc) - post op VC issues/ dry cough- SOB / HORVATH Acute Bronchitis / Asthmatic Bronchitis (resolved)-but vocal issues/periodic cough (Dystonia)  problem 1: abnormal chest CT - c/w inflammation - ?COVID-19 residue (improved  -PPD Quantiferon gold (-), ESR / Hypersensitivity panel (wnl) -Follow up Chest CT 3/2021 - 9/2021, 3/2022, 3/2023, 8/2023, next 11/2023   problem 2: asthma (semi persistent) - (Active) -s/p Breztri 2 puffs BID = Symbicort/Spiriva/Asmanex - now on Trelegy 200 1 puff QD -continue to use Singulair 10 mg QHS  -continue to use Xopenex PRN / Xopenex 0.63 via nebulizer q6H prn -s/p Prednisone 20 mg x 7 days, 10 mg x 7 days (Trial: 4/6/2023)  Information sheet given to the patient to be reviewed, this medication is never to be used without consulting the prescribing physician. Proper dietary restraint is necessary specifically salt containing foods, if any reaction may occur should be reported.  -Asthma is  believed to be caused by inherited (genetic) and environmental factor, but its exact cause is unknown. Asthma may be triggered by allergens, lung infections, or irritants in the air. Asthma triggers are different for each person -Inhaler technique reviewed as well as oral hygiene techniques reviewed with patient. Avoidance of cold air, extremes of temperature, rescue inhaler should be used before exercise. Order of medication reviewed with patient. Recommended use of a cool mist humidifier in the bedroom.  problem 2A: Biologic evaluation - Script given for blood work: asthma profile, food IgE panel, eosinophil level, IgE level, Vitamin D level (NC) -considering Dupixent (Prezant) 7/2023 pending - The safety and efficacy of Nucala was established in three double-blind, randomized, placebo-controlled trials in patients with severe asthma. Compared to a placebo, patients with severe asthma receiving Nucala had fewer exacerbation requiring hospitalization and/or emergency department visits, and a longer time to first exacerbation. In addition, patients with severe asthma receiving Nucala or Fasenra experienced greater reductions in their daily maintenance oral corticosteroid dose, while maintaining asthma control compared with patients receiving placebo. Treatment with Nucala did not result in a significant improvement in lung function, as measured by the volume of air exhaled by patients in one second. The most common side effects include: headache, injection site reactions, back pain, weakness, and fatigue; hypersensitivity reactions can occur within hours or days including swelling of the face, mouth, and tongue, fainting, dizziness, hives, breathing problems, and rash; herpes zoster infections have occurred. The drug is a monoclonal antibody that inhibits interleukin-5 which helps regular eosinophils, a type of white blood cell that contributes to asthma. The over-production of eosinophils can cause inflammation in the lungs, increasing the frequency of asthma attacks. Patients must also take other medications, including high dose inhaled corticosteroids and at least one additional asthma drug.   problem 3: cough; ? Sensory neuropathic cough (BM) -off Amitriptyline 10 mg up to TID, prn (DC if able) -Neurontin 300 QSH  -continue Promethazine DM prn Sensory neuropathic cough is an etiology of cough that is often realized once someone has been ruled out for common disease such as: asthma, COPD, eosinophilic bronchitis, bronchiectasis, post nasal drip, and GERD. It sometimes develops following a URI, herpes zoster outbreak in pharynx or thyroid or cervical spine injury. However, many patients have no identifiable antecedent explanation.   Problem 3A: +TBM right Bronchomalacia (s/p surgery 2/2023) -s/p Dynamic CT- CTS eval Dr Alvin Stubbs -Tracheomalacia is usually acquired in adults and common causes include damage by tracheostomy or endotracheal intubation damaging the tracheal cartilage with increase risk with multiple intubations, prolonged intubation, and concurrent high dose steroid therapy; external chest wall trauma and surgery; chronic compression of the trachea by benign etiologies (eg, benign mediastinal goiter) or malignancy; relapsing polychondritis; or recurrent infection. Tracheomalacia can be asymptomatic, however signs or symptoms can develop as the severity of the airway narrowing progresses with major symptoms include dyspnea, cough, and sputum retention. Other symptoms include severe paroxysms of coughing, wheezing or stridor, barking cough and may be exacerbated by forced expiration, cough, and valsalva maneuver. Tracheomalacia is diagnosed by a bronchoscopic visualization of dynamic airway collapse on dynamic chest CT. Therapy is warranted in symptomatic patients with severe tracheomalacia and includes surgical repair as tracheobronchoplasty. The patient was referred to Dr. Alvin Stubbs or Dr. Vinny Lilly, at [facility] for a surgical consult.    problem 4: allergic rhinitis  -recommended to use "Navage" nasal rinse device  -continue Claritin 10 mg QAM  -continue to use Xyzal 5 mg QHS -continue to use Omnaris 1 sniff/nostril BID -continue Olopatadine 1 sniff each nostril BID (.6) -follow up with Dr. Ok Islas  - s/p turbinectomy  -Environmental measures for allergies were encouraged including mattress and pillow cover, air purifier, and environmental controls.  Problem 4A: Laryngospasm / Dystonia -Speech Rx -?Botox Rx (pending gHI) -Lidocaine nebulizer 2% QHS -transition bethanechol 5 mg every 8 hours to Baclofen 10 mg premeal, QHS   Problem 4B: VC dysfunction / Dystonia  -Roshan / Dawson ( [facility])  -add Aerobika   problem 5: GERD (FeeSST- c/w LPR) -continue to use Dexilant 60 mg before first meal -continue to use Pepcid 40 mg QHS  -Rule of 2s: avoid eating too much, eating too late, eating too spicy, eating two hours before bed -Things to avoid including overeating, spicy foods, tight clothing, eating within three hours of bed, this list is not all inclusive.  -For treatment of reflux, possible options discussed including diet control, H2 blockers, PPIs, as well as coating motility agents discussed as treatment options. Timing of meals and proximity of last meal to sleep were discussed. If symptoms persist, a formal gastrointestinal evaluation is needed.   problem 6: insomnia - medical marijuana -continue to use Ambien PRN  -recommended good sleep hygiene -Good sleep hygiene was encouraged including avoiding watching television an hour before bed, keeping caffeine at a low,  avoiding reading, television, or anything, in bed, no drinking any liquids three hours before bedtime, and only getting into bed when tired and ready for sleep.  problem 7: foot cramps (resolved) -continue to use MyoCalm PM  Problem 8a: s/p COVID 19 infection 12/2020 (residual Sx)  -Recommended not to get an additional COVID-19 booster at this time until it is updated against the current variants. If planning on travelling, obtaining another booster within 2 weeks of departure is recommended.  -Covid 19 preclusions, vaccine and booster discussed at length and recommended  - S/p Covid 19 vaccine (Moderna) x3 (12/2021) -s/p MAB infusion  -educated patient on COVID 19 vaccine and appropriate recommendations  -vaccine pending COVID-19 precautionary Immune Support Recommendations: -OTC Vitamin C 1000mg BID  -OTC Quercetin 500mg BID  -OTC Zinc 50 mg per day  -OTC Melatonin 5 mg a night  -OTC Vitamin D 2000mg per day  -OTC Tonic Water 8oz per day -Water 0.5-1 gallon per day Additional Support Supplements:  -Liposomal Glutathione 500 mg BID -SPM 1 tablet BID  -Berberine 1000 mg BID   - mg BID   problem 8: health maintenance -PPD negative as of 9/20/2021 -received flu shot in - 9/2021 -recommended strep pneumonia vaccines: Prevnar-13 vaccine, followed by Pneumo vaccine 23 on year following -recommended early intervention for URIs -recommended osteoporosis evaluations -recommended early dermatological evaluations -recommended after the age of 50 to the age of 70, colonoscopy every 5 years -encouraged early intervention   F/U in 4 months He is encouraged to call with any changes, concerns, or questions.   ***********************ALL PRINTED SCRIPTS TO BE FILLED THROUGH [facility]************************************

## 2023-09-07 NOTE — PHYSICAL EXAM
[No Acute Distress] : no acute distress [Normal Oropharynx] : normal oropharynx [II] : Mallampati Class: II [Normal Appearance] : normal appearance [No Neck Mass] : no neck mass [Normal Rate/Rhythm] : normal rate/rhythm [Normal S1, S2] : normal s1, s2 [No Murmurs] : no murmurs [No Resp Distress] : no resp distress [Clear to Auscultation Bilaterally] : clear to auscultation bilaterally [No Abnormalities] : no abnormalities [Benign] : benign [Normal Gait] : normal gait [No Clubbing] : no clubbing [No Cyanosis] : no cyanosis [No Edema] : no edema [FROM] : FROM [Normal Color/ Pigmentation] : normal color/ pigmentation [No Focal Deficits] : no focal deficits [Oriented x3] : oriented x3 [Normal Affect] : normal affect [Kyphosis] : kyphosis [TextBox_68] : I:E ratio 1:3; clear  [TextBox_80] : Mild kyphosis

## 2023-09-12 NOTE — PATIENT PROFILE ADULT - NSPRESCRALCFREQ_GEN_A_NUR
NEUROSURGERY PROGRESS NOTE     LOS: 0 days   Patient Care Team:  Jaret Castellanos MD as PCP - General (Family Medicine)  Felipe Garcia MD as Consulting Physician (Cardiology)  Golden Serna RN as Ambulatory  ([facility])    Chief Complaint:  back pain    Subjective     Interval History:     NAEO, patient reports no acute neurologic issues.  Pt moving all extremities.  Patient does report a history of idiopathic thrombocytopenia of which no formal work-up has been completed.    While in the room and during my examination of the patient I wore a mask and eye protection.  I washed my hands before and after this patient encounter.  The patient was also wearing a mask.     History taken from: patient chart    Objective      Vital Signs  Temp:  [97.9 °F (36.6 °C)-99.4 °F (37.4 °C)] 99 °F (37.2 °C)  Heart Rate:  [] 71  Resp:  [15-22] 19  BP: (106-137)/(54-79) 137/68  Body mass index is 29.69 kg/m².    Intake/Output last 3 shifts:  I/O last 3 completed shifts:  In: 480 [P.O.:480]  Out: 1600 [Urine:1600]    Intake/Output this shift:  No intake/output data recorded.    Physical    General:  Awake, alert, and oriented x 3. NAD  Back:   Point tenderness midline low back       Extremities:   Patient is not wearing SCD bilaterally    Results Review:     I reviewed the patient's new clinical results.    Labs:    Lab Results (last 24 hours)       ** No results found for the last 24 hours. **            Imaging:    I personally reviewed the images from the following radiographic studies.    No new imaging    Current Medications:   Scheduled Meds:allopurinol, 100 mg, Oral, Daily  atorvastatin, 40 mg, Oral, Nightly  cyclobenzaprine, 10 mg, Oral, TID  DULoxetine, 60 mg, Oral, Daily  enoxaparin, 40 mg, Subcutaneous, Q24H  famotidine, 40 mg, Oral, Nightly  metoprolol tartrate, 25 mg, Oral, Daily  sodium chloride, 10 mL, Intravenous, Q12H      Continuous Infusions:     Assessment & Plan       Back  pain  Spinal fracture  Chronic thrombocytopenia    Assessment: Patient is a 75-year-old male being followed for an unstable fracture along L3-L4 with extension into posterior elements of L3.  Patient with chronic history of thrombocytopenia and current platelet level of 86.  Appreciate medical input for surgical clearance.    Plan:     Spinal fracture    -Continue strict bedrest  -Cardiology clearance obtained  - OR Wednesday afternoon pending medical clearance  -Type and screen; preop labs pending      I discussed my findings with patient, nursing staff and Dr. Strauss.        Caroline Robert, APRN  09/12/23  08:03 EDT       2-4 times a month

## 2023-11-10 ENCOUNTER — APPOINTMENT (OUTPATIENT)
Dept: GASTROENTEROLOGY | Facility: CLINIC | Age: 58
End: 2023-11-10
Payer: COMMERCIAL

## 2023-11-10 VITALS
HEART RATE: 77 BPM | TEMPERATURE: 96.8 F | HEIGHT: 67 IN | OXYGEN SATURATION: 97 % | WEIGHT: 170 LBS | SYSTOLIC BLOOD PRESSURE: 116 MMHG | DIASTOLIC BLOOD PRESSURE: 74 MMHG | BODY MASS INDEX: 26.68 KG/M2

## 2023-11-10 DIAGNOSIS — D50.9 IRON DEFICIENCY ANEMIA, UNSPECIFIED: ICD-10-CM

## 2023-11-10 DIAGNOSIS — K22.70 BARRETT'S ESOPHAGUS W/OUT DYSPLASIA: ICD-10-CM

## 2023-11-10 DIAGNOSIS — Z86.010 PERSONAL HISTORY OF COLONIC POLYPS: ICD-10-CM

## 2023-11-10 PROCEDURE — 99214 OFFICE O/P EST MOD 30 MIN: CPT

## 2023-11-10 RX ORDER — SODIUM PICOSULFATE, MAGNESIUM OXIDE, AND ANHYDROUS CITRIC ACID 12; 3.5; 1 G/175ML; G/175ML; MG/175ML
10-3.5-12 MG-GM LIQUID ORAL
Qty: 2 | Refills: 0 | Status: ACTIVE | COMMUNITY
Start: 2023-11-10 | End: 1900-01-01

## 2023-11-13 ENCOUNTER — APPOINTMENT (OUTPATIENT)
Dept: CT IMAGING | Facility: CLINIC | Age: 58
End: 2023-11-13
Payer: COMMERCIAL

## 2023-11-13 ENCOUNTER — OUTPATIENT (OUTPATIENT)
Dept: OUTPATIENT SERVICES | Facility: HOSPITAL | Age: 58
LOS: 1 days | End: 2023-11-13
Payer: SELF-PAY

## 2023-11-13 DIAGNOSIS — Z98.890 OTHER SPECIFIED POSTPROCEDURAL STATES: Chronic | ICD-10-CM

## 2023-11-13 DIAGNOSIS — Z96.641 PRESENCE OF RIGHT ARTIFICIAL HIP JOINT: Chronic | ICD-10-CM

## 2023-11-13 DIAGNOSIS — Z90.49 ACQUIRED ABSENCE OF OTHER SPECIFIED PARTS OF DIGESTIVE TRACT: Chronic | ICD-10-CM

## 2023-11-13 DIAGNOSIS — Z98.1 ARTHRODESIS STATUS: Chronic | ICD-10-CM

## 2023-11-13 DIAGNOSIS — Z98.84 BARIATRIC SURGERY STATUS: Chronic | ICD-10-CM

## 2023-11-13 DIAGNOSIS — Z00.8 ENCOUNTER FOR OTHER GENERAL EXAMINATION: ICD-10-CM

## 2023-11-13 PROCEDURE — 71250 CT THORAX DX C-: CPT | Mod: 26

## 2023-11-13 PROCEDURE — 71250 CT THORAX DX C-: CPT

## 2023-12-10 NOTE — H&P PST ADULT - NSICDXPASTMEDICALHX_GEN_ALL_CORE_FT
independent PAST MEDICAL HISTORY:  Abnormal chest CT     Anemia     Anxiety     Asthma no previous intubation for asthma, reactive airway    Bronchomalacia     Disc displacement, lumbar     Dysphonia     Gastroesophageal reflux disease with hiatal hernia     GERD (gastroesophageal reflux disease)     History of obesity s/p gastric banding 2007 (Pt refuses to deflate lap band for surgery 12/11/19    History of tracheomalacia     HTN (hypertension)     Insomnia     Lung nodule bilateral    Other specified diseases of upper respiratory tract     Other spondylosis with radiculopathy, cervical region     PTSD (post-traumatic stress disorder) [employer] survivor

## 2023-12-21 ENCOUNTER — APPOINTMENT (OUTPATIENT)
Dept: THORACIC SURGERY | Facility: CLINIC | Age: 58
End: 2023-12-21
Payer: COMMERCIAL

## 2023-12-21 VITALS
DIASTOLIC BLOOD PRESSURE: 78 MMHG | OXYGEN SATURATION: 99 % | BODY MASS INDEX: 26.63 KG/M2 | HEART RATE: 85 BPM | SYSTOLIC BLOOD PRESSURE: 112 MMHG | WEIGHT: 170 LBS | RESPIRATION RATE: 15 BRPM

## 2023-12-21 PROCEDURE — 99213 OFFICE O/P EST LOW 20 MIN: CPT

## 2023-12-21 RX ORDER — SUMATRIPTAN SUCCINATE 100 MG/1
TABLET, FILM COATED ORAL
Refills: 0 | Status: ACTIVE | COMMUNITY

## 2023-12-21 RX ORDER — DIAZEPAM 20 MG/4ML
20 GEL RECTAL
Refills: 0 | Status: ACTIVE | COMMUNITY

## 2023-12-21 RX ORDER — DUPILUMAB 200 MG/1.14ML
INJECTION, SOLUTION SUBCUTANEOUS
Refills: 0 | Status: ACTIVE | COMMUNITY

## 2023-12-21 RX ORDER — DARIDOREXANT 25 MG/1
25 TABLET, FILM COATED ORAL
Refills: 0 | Status: ACTIVE | COMMUNITY

## 2023-12-21 RX ORDER — MONTELUKAST SODIUM 10 MG/1
10 TABLET, FILM COATED ORAL
Refills: 0 | Status: ACTIVE | COMMUNITY

## 2023-12-21 RX ORDER — LEVOCETIRIZINE DIHYDROCHLORIDE 5 MG/1
5 TABLET, FILM COATED ORAL
Refills: 0 | Status: ACTIVE | COMMUNITY

## 2023-12-22 NOTE — DATA REVIEWED
[No studies available for review at this time.] : No studies available for review at this time. [FreeTextEntry1] : I have independently reviewed the following: CT Chest on 11/13/23

## 2023-12-22 NOTE — ASSESSMENT
[FreeTextEntry1] : Mr. 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 Justice Lama.  Now s/p a Flex Bronch, Rt VATS R.A. MLND, tracheobronchoplasty on 2/14/23. Path of Levels 11, 7, and 10 LNs all reactive.  I have reviewed the patient's medical records and diagnostic images at time of this office consultation and have made the following recommendation: 1. CT scan showed a new lung nodule, I recommended patient to return to office in 6 months w/ CT Chest w/o contrast.   I, ANGELIA George, personally performed the evaluation and management (E/M) services for this established patient who presents today with (a) new problem(s)/exacerbation of (an) existing condition(s). That E/M includes conducting the examination, assessing all new/exacerbated conditions, and establishing a new plan of care. Today, my ACP, Saba Root NP was here to observe my evaluation and management services for this new problem/exacerbated condition to be followed going forward.

## 2023-12-22 NOTE — CONSULT LETTER
[Dear  ___] : Dear  [unfilled], [Consult Letter:] : I had the pleasure of evaluating your patient, [unfilled]. [( Thank you for referring [unfilled] for consultation for _____ )] : Thank you for referring [unfilled] for consultation for [unfilled] [Please see my note below.] : Please see my note below. [Consult Closing:] : Thank you very much for allowing me to participate in the care of this patient.  If you have any questions, please do not hesitate to contact me. [Sincerely,] : Sincerely, [FreeTextEntry2] : Dr. Justice Lama (Pulm/Referring) [FreeTextEntry3] : Alvin Stubbs MD, MPH \par  System Director of Thoracic Surgery \par  Director of Comprehensive Lung and Foregut Hope \par  Professor Cardiovascular & Thoracic Surgery  \par  [facility] [employer] [facility]\par  \par  [facility] [place]\par  [address]\par  Oncology [address]\[place] [address]\par  Tel: [phone]\par  Fax: [phone]\par

## 2024-01-18 NOTE — H&P PST ADULT - PATIENT'S GENDER IDENTITY
How Severe Is Your Skin Lesion?: mild Is This A New Presentation, Or A Follow-Up?: Skin Lesions Additional History: Pt states the lesion on the nose has been bleeding and not healing. Male

## 2024-01-19 ENCOUNTER — APPOINTMENT (OUTPATIENT)
Dept: PULMONOLOGY | Facility: CLINIC | Age: 59
End: 2024-01-19
Payer: COMMERCIAL

## 2024-01-19 VITALS
DIASTOLIC BLOOD PRESSURE: 74 MMHG | OXYGEN SATURATION: 98 % | WEIGHT: 166 LBS | TEMPERATURE: 97.2 F | BODY MASS INDEX: 26.06 KG/M2 | RESPIRATION RATE: 16 BRPM | SYSTOLIC BLOOD PRESSURE: 126 MMHG | HEIGHT: 67 IN | HEART RATE: 68 BPM

## 2024-01-19 DIAGNOSIS — J45.909 UNSPECIFIED ASTHMA, UNCOMPLICATED: ICD-10-CM

## 2024-01-19 PROCEDURE — 99214 OFFICE O/P EST MOD 30 MIN: CPT

## 2024-01-19 RX ORDER — AZITHROMYCIN 500 MG/1
500 TABLET, FILM COATED ORAL DAILY
Qty: 7 | Refills: 0 | Status: DISCONTINUED | COMMUNITY
Start: 2023-04-26 | End: 2024-01-19

## 2024-01-22 ENCOUNTER — TRANSCRIPTION ENCOUNTER (OUTPATIENT)
Age: 59
End: 2024-01-22

## 2024-01-22 NOTE — ASSESSMENT
[FreeTextEntry1] : **********************ALL PRINTED SCRIPTS TO BE FILLED THROUGH [facility]D************************* Mr. Corrales is a 58 year old male who has a history of asthma, allergy, GERD, cervicale spine Dz (s/p surgery 12/19) and abnormal CT. He is s/p WTC exposure - s/p complicated cervical spine surgery (12/2019) s/p COVID 19 12/2020- s/p THR (right) 5/2021- s/p COVID-19 vaccine reaction 12/2021 - +TBM right Bronchomalacia - s/p TBM surgery (Enoc) - post-op VC issues/ dry cough- SOB / HORVATH Acute Bronchitis / Asthmatic Bronchitis (resolved)-but vocal issues/periodic cough (Dystonia); mucus production  *********************PRE PROCEDURE CLEARANCE FOR ENDOSCOPY*********************** -at this point in time there are no absolute pulmonary contraindications to go forward with the planned procedure  problem 1: abnormal chest CT - c/w inflammation - ?COVID-19 residue (improved -PPD Quantiferon gold (-), ESR / Hypersensitivity panel (wnl) -Follow up Chest CT 3/2021 - 9/2021, 3/2022, 3/2023, 8/2023, 11/2023- next 2/2024  problem 2: asthma (semi-persistent) - (Active) -s/p Breztri 2 puffs BID = Symbicort/Spiriva/Asmanex - now on Trelegy 200 1 puff QD -continue to use Singulair 10 mg QHS -continue to use Xopenex PRN / Xopenex 0.63 via nebulizer q6H prn -s/p Prednisone 20 mg x 7 days, 10 mg x 7 days (Trial: 4/6/2023) Information sheet given to the patient to be reviewed, this medication is never to be used without consulting the prescribing physician. Proper dietary restraint is necessary specifically salt containing foods, if any reaction may occur should be reported. -Asthma is believed to be caused by inherited (genetic) and environmental factor, but its exact cause is unknown. Asthma may be triggered by allergens, lung infections, or irritants in the air. Asthma triggers are different for each person -Inhaler technique reviewed as well as oral hygiene techniques reviewed with patient. Avoidance of cold air, extremes of temperature, rescue inhaler should be used before exercise. Order of medication reviewed with patient. Recommended use of a cool mist humidifier in the bedroom.  problem 2A: Biologic evaluation - Script given for blood work: asthma profile, food IgE panel, eosinophil level, IgE level, Vitamin D level (NC) -Dupixent (Prezant) 7/2023 in place - The safety and efficacy of Nucala was established in three double-blind, randomized, placebo-controlled trials in patients with severe asthma. Compared to a placebo, patients with severe asthma receiving Nucala had fewer exacerbation requiring hospitalization and/or emergency department visits, and a longer time to first exacerbation. In addition, patients with severe asthma receiving Nucala or Fasenra experienced greater reductions in their daily maintenance oral corticosteroid dose, while maintaining asthma control compared with patients receiving placebo. Treatment with Nucala did not result in a significant improvement in lung function, as measured by the volume of air exhaled by patients in one second. The most common side effects include: headache, injection site reactions, back pain, weakness, and fatigue; hypersensitivity reactions can occur within hours or days including swelling of the face, mouth, and tongue, fainting, dizziness, hives, breathing problems, and rash; herpes zoster infections have occurred. The drug is a monoclonal antibody that inhibits interleukin-5 which helps regular eosinophils, a type of white blood cell that contributes to asthma. The over-production of eosinophils can cause inflammation in the lungs, increasing the frequency of asthma attacks. Patients must also take other medications, including high dose inhaled corticosteroids and at least one additional asthma drug.  problem 3: cough; ? Sensory neuropathic cough (BM) -off Amitriptyline 10 mg up to TID, prn (DC if able) -off Neurontin 300 Q8H -add  mg BID  -continue Promethazine DM prn Sensory neuropathic cough is an etiology of cough that is often realized once someone has been ruled out for common disease such as: asthma, COPD, eosinophilic bronchitis, bronchiectasis, post nasal drip, and GERD. It sometimes develops following a URI, herpes zoster outbreak in pharynx or thyroid or cervical spine injury. However, many patients have no identifiable antecedent explanation.  Problem 3A: +TBM right Bronchomalacia (s/p surgery 2/2023)- "nodule" -s/p Dynamic CT- CTS eval Dr Alvin Stubbs -next CT 2/2024 -Tracheomalacia is usually acquired in adults and common causes include damage by tracheostomy or endotracheal intubation damaging the tracheal cartilage with increase risk with multiple intubations, prolonged intubation, and concurrent high dose steroid therapy; external chest wall trauma and surgery; chronic compression of the trachea by benign etiologies (eg, benign mediastinal goiter) or malignancy; relapsing polychondritis; or recurrent infection. Tracheomalacia can be asymptomatic, however signs or symptoms can develop as the severity of the airway narrowing progresses with major symptoms include dyspnea, cough, and sputum retention. Other symptoms include severe paroxysms of coughing, wheezing or stridor, barking cough and may be exacerbated by forced expiration, cough, and valsalva maneuver. Tracheomalacia is diagnosed by a bronchoscopic visualization of dynamic airway collapse on dynamic chest CT. Therapy is warranted in symptomatic patients with severe tracheomalacia and includes surgical repair as tracheobronchoplasty. The patient was referred to Dr. Alvin Stubbs or Dr. Vinny Lilly, at [facility] for a surgical consult.  problem 4: allergic rhinitis -recommended to use "Navage" nasal rinse device -continue Claritin 10 mg QAM -continue to use Xyzal 5 mg QHS -discontinue Omnaris 1 sniff/nostril BID -continue Olopatadine 1 sniff each nostril BID (.6) -use Vicks Sinex nasal spray -follow up with Dr. Ok Islas - s/p turbinectomy -Environmental measures for allergies were encouraged including mattress and pillow cover, air purifier, and environmental controls.  Problem 4A: Laryngospasm / Dystonia -Speech Rx -s/p Botox Rx (gHI) -Lidocaine nebulizer 2% QHS -transition bethanechol 5 mg every 8 hours to Baclofen 10 mg premeal, QHS  Problem 4B: VC dysfunction / Dystonia -Diblasi / [place] ( [place]) -add Aerobika  problem 5: GERD (FEESST- c/w LPR) -continue to use Dexilant 60 mg before first meal -continue to use Pepcid 80 mg QHS -Rule of 2s: avoid eating too much, eating too late, eating too spicy, eating two hours before bed -Things to avoid including overeating, spicy foods, tight clothing, eating within three hours of bed, this list is not all inclusive. -For treatment of reflux, possible options discussed including diet control, H2 blockers, PPIs, as well as coating motility agents discussed as treatment options. Timing of meals and proximity of last meal to sleep were discussed. If symptoms persist, a formal gastrointestinal evaluation is needed.  problem 6: insomnia - medical marijuana -continue to use Ambien PRN -recommended good sleep hygiene -Good sleep hygiene was encouraged including avoiding watching television an hour before bed, keeping caffeine at a low, avoiding reading, television, or anything, in bed, no drinking any liquids three hours before bedtime, and only getting into bed when tired and ready for sleep.  problem 7: foot cramps (resolved) -continue to use MyoCalm PM  Problem 8a: s/p COVID 19 infection 12/2020 (residual Sx) -Recommended not to get an additional COVID-19 booster at this time until it is updated against the current variants. If planning on travelling, obtaining another booster within 2 weeks of departure is recommended. -Covid 19 preclusions, vaccine and booster discussed at length and recommended - S/p Covid 19 vaccine (Moderna) x3 (12/2021) -s/p MAB infusion -educated patient on COVID 19 vaccine and appropriate recommendations -vaccine pending COVID-19 precautionary Immune Support Recommendations: -OTC Vitamin C 1000mg BID -OTC Quercetin 500mg BID -OTC Zinc 50 mg per day -OTC Melatonin 5 mg a night -OTC Vitamin D 2000mg per day -OTC Tonic Water 8oz per day -Water 0.5-1 gallon per day Additional Support Supplements: -Liposomal Glutathione 500 mg BID -SPM 1 tablet BID -Berberine 1000 mg BID - mg BID  problem 8: health maintenance -PPD negative as of 9/20/2021 -received flu shot in - 9/2021 -recommended strep pneumonia vaccines: Prevnar-13 vaccine, followed by Pneumo vaccine 23 on year following -recommended early intervention for URIs -recommended osteoporosis evaluations -recommended early dermatological evaluations -recommended after the age of 50 to the age of 70, colonoscopy every 5 years -encouraged early intervention  F/P in 4 months He is encouraged to call with any changes, concerns, or questions. ***********************ALL PRINTED SCRIPTS TO BE FILLED THROUGH [facility]*******************************  *********************PRE PROCEDURE CLEARANCE FOR ENDOSCOPY*********************** -at this point in time there are no absolute pulmonary contraindications to go forward with the planned procedure

## 2024-01-22 NOTE — ADDENDUM
[FreeTextEntry1] : ****************AMENDED BY CONSTANCE ALMENDAREZ 1/22/2024****************8  Documented by Merissa Stoner acting as a scribe for Dr. Justice Lama on 01/19/2024. All medical record entries made by the Scribe were at my, Dr. Justice Lama's, direction and personally dictated by me on 01/19/2024. I have reviewed the chart and agree that the record accurately reflects my personal performance of the history, physical exam, assessment and plan. I have also personally directed, reviewed, and agree with the discharge instructions.

## 2024-01-22 NOTE — HISTORY OF PRESENT ILLNESS
[FreeTextEntry1] : Mr. Corrales is a 58 year old male with a history of allergic rhinitis, asthma, abnormal chest CT, and GERD presenting to the office today for a follow up pulmonary evaluation. His chief complaint is  -he notes voice is very froggy -he notes he's been coughing frequently -he notes he's now able to bring up sputum s/p Botox. He brings up green, greenish-brown, or clear sputum. The color doesn't depend on the time of day -he notes he's lost some weight due to the Botox -he notes a sensation in his throat as if nothing will go down, so he stops eating -he notes dysphagia with solids and liquids -he notes Dr. Hickey did the RhinAer procedure on him, and he still has chronic rhinitis -he notes he still gets intermittent foot cramps -he notes he's stopped taking gabapentin -he notes he's using Trelegy, and he's on Dupixent -he denies using a nebulizer solution -he notes less severe GERD. He takes 80 mg Pepcid at night and Dexilant qAM. For breakthrough, he takes an OTC herbal anti-acid, which works very well for him -he notes he gets severe headaches after taking Dupixent. Dr. Lee has advised him this is normal for some people -he notes poor quality sleep due to PTSD  -he denies any nausea, emesis, fever, chills, sweats, chest pain, chest pressure, wheezing, palpitations, diarrhea, constipation, vertigo, arthralgias, leg swelling, itchy eyes, itchy ears, or sour taste in the mouth.

## 2024-01-22 NOTE — PHYSICAL EXAM
[No Acute Distress] : no acute distress [Normal Oropharynx] : normal oropharynx [II] : Mallampati Class: II [Normal Appearance] : normal appearance [No Neck Mass] : no neck mass [Normal Rate/Rhythm] : normal rate/rhythm [Normal S1, S2] : normal s1, s2 [No Murmurs] : no murmurs [No Resp Distress] : no resp distress [Clear to Auscultation Bilaterally] : clear to auscultation bilaterally [Kyphosis] : kyphosis [Benign] : benign [Normal Gait] : normal gait [No Clubbing] : no clubbing [No Cyanosis] : no cyanosis [No Edema] : no edema [FROM] : FROM [Normal Color/ Pigmentation] : normal color/ pigmentation [No Focal Deficits] : no focal deficits [Oriented x3] : oriented x3 [Normal Affect] : normal affect [TextBox_68] : I:E ratio 1:3; clear

## 2024-02-20 ENCOUNTER — OUTPATIENT (OUTPATIENT)
Dept: OUTPATIENT SERVICES | Facility: HOSPITAL | Age: 59
LOS: 1 days | End: 2024-02-20
Payer: COMMERCIAL

## 2024-02-20 ENCOUNTER — APPOINTMENT (OUTPATIENT)
Dept: CT IMAGING | Facility: CLINIC | Age: 59
End: 2024-02-20
Payer: COMMERCIAL

## 2024-02-20 DIAGNOSIS — Z98.890 OTHER SPECIFIED POSTPROCEDURAL STATES: Chronic | ICD-10-CM

## 2024-02-20 DIAGNOSIS — Z90.49 ACQUIRED ABSENCE OF OTHER SPECIFIED PARTS OF DIGESTIVE TRACT: Chronic | ICD-10-CM

## 2024-02-20 DIAGNOSIS — Z98.1 ARTHRODESIS STATUS: Chronic | ICD-10-CM

## 2024-02-20 DIAGNOSIS — Z00.8 ENCOUNTER FOR OTHER GENERAL EXAMINATION: ICD-10-CM

## 2024-02-20 DIAGNOSIS — Z96.641 PRESENCE OF RIGHT ARTIFICIAL HIP JOINT: Chronic | ICD-10-CM

## 2024-02-20 DIAGNOSIS — Z98.84 BARIATRIC SURGERY STATUS: Chronic | ICD-10-CM

## 2024-02-20 PROCEDURE — 71250 CT THORAX DX C-: CPT

## 2024-02-20 PROCEDURE — 71250 CT THORAX DX C-: CPT | Mod: 26

## 2024-02-27 ENCOUNTER — NON-APPOINTMENT (OUTPATIENT)
Age: 59
End: 2024-02-27

## 2024-03-06 ENCOUNTER — APPOINTMENT (OUTPATIENT)
Dept: GASTROENTEROLOGY | Facility: HOSPITAL | Age: 59
End: 2024-03-06
Payer: COMMERCIAL

## 2024-03-06 ENCOUNTER — OUTPATIENT (OUTPATIENT)
Dept: OUTPATIENT SERVICES | Facility: HOSPITAL | Age: 59
LOS: 1 days | Discharge: HOME | End: 2024-03-06
Payer: COMMERCIAL

## 2024-03-06 ENCOUNTER — RESULT REVIEW (OUTPATIENT)
Age: 59
End: 2024-03-06

## 2024-03-06 VITALS
DIASTOLIC BLOOD PRESSURE: 68 MMHG | WEIGHT: 164.91 LBS | HEIGHT: 68 IN | RESPIRATION RATE: 16 BRPM | SYSTOLIC BLOOD PRESSURE: 105 MMHG | TEMPERATURE: 97 F | OXYGEN SATURATION: 98 % | HEART RATE: 71 BPM

## 2024-03-06 VITALS
RESPIRATION RATE: 19 BRPM | SYSTOLIC BLOOD PRESSURE: 110 MMHG | HEART RATE: 70 BPM | OXYGEN SATURATION: 99 % | DIASTOLIC BLOOD PRESSURE: 70 MMHG

## 2024-03-06 DIAGNOSIS — Z98.890 OTHER SPECIFIED POSTPROCEDURAL STATES: Chronic | ICD-10-CM

## 2024-03-06 DIAGNOSIS — K22.70 BARRETT'S ESOPHAGUS WITHOUT DYSPLASIA: ICD-10-CM

## 2024-03-06 DIAGNOSIS — Z96.641 PRESENCE OF RIGHT ARTIFICIAL HIP JOINT: Chronic | ICD-10-CM

## 2024-03-06 DIAGNOSIS — Z98.84 BARIATRIC SURGERY STATUS: Chronic | ICD-10-CM

## 2024-03-06 DIAGNOSIS — Z98.1 ARTHRODESIS STATUS: Chronic | ICD-10-CM

## 2024-03-06 DIAGNOSIS — Z90.49 ACQUIRED ABSENCE OF OTHER SPECIFIED PARTS OF DIGESTIVE TRACT: Chronic | ICD-10-CM

## 2024-03-06 PROCEDURE — 45385 COLONOSCOPY W/LESION REMOVAL: CPT | Mod: 33

## 2024-03-06 PROCEDURE — 43239 EGD BIOPSY SINGLE/MULTIPLE: CPT | Mod: 59

## 2024-03-06 PROCEDURE — 45380 COLONOSCOPY AND BIOPSY: CPT | Mod: 33,59

## 2024-03-06 PROCEDURE — 88305 TISSUE EXAM BY PATHOLOGIST: CPT | Mod: 26

## 2024-03-06 RX ORDER — SODIUM CHLORIDE 9 MG/ML
500 INJECTION, SOLUTION INTRAVENOUS
Refills: 0 | Status: DISCONTINUED | OUTPATIENT
Start: 2024-03-06 | End: 2024-03-20

## 2024-03-06 NOTE — ASU PATIENT PROFILE, ADULT - FALL HARM RISK - UNIVERSAL INTERVENTIONS
Bed in lowest position, wheels locked, appropriate side rails in place/Call bell, personal items and telephone in reach/Instruct patient to call for assistance before getting out of bed or chair/Non-slip footwear when patient is out of bed/[place] to call system/Physically safe environment - no spills, clutter or unnecessary equipment/Purposeful Proactive Rounding/Room/bathroom lighting operational, light cord in reach

## 2024-03-11 LAB — SURGICAL PATHOLOGY STUDY: SIGNIFICANT CHANGE UP

## 2024-03-14 ENCOUNTER — NON-APPOINTMENT (OUTPATIENT)
Age: 59
End: 2024-03-14

## 2024-03-21 ENCOUNTER — APPOINTMENT (OUTPATIENT)
Dept: GASTROENTEROLOGY | Facility: AMBULATORY MEDICAL SERVICES | Age: 59
End: 2024-03-21

## 2024-04-25 ENCOUNTER — APPOINTMENT (OUTPATIENT)
Dept: GASTROENTEROLOGY | Facility: CLINIC | Age: 59
End: 2024-04-25
Payer: COMMERCIAL

## 2024-04-25 VITALS
BODY MASS INDEX: 26.06 KG/M2 | DIASTOLIC BLOOD PRESSURE: 76 MMHG | SYSTOLIC BLOOD PRESSURE: 120 MMHG | TEMPERATURE: 97.3 F | HEART RATE: 67 BPM | HEIGHT: 67 IN | WEIGHT: 166 LBS | OXYGEN SATURATION: 98 %

## 2024-04-25 DIAGNOSIS — K44.9 DIAPHRAGMATIC HERNIA W/OUT OBSTRUCTION OR GANGRENE: ICD-10-CM

## 2024-04-25 DIAGNOSIS — D12.6 BENIGN NEOPLASM OF COLON, UNSPECIFIED: ICD-10-CM

## 2024-04-25 DIAGNOSIS — K64.4 RESIDUAL HEMORRHOIDAL SKIN TAGS: ICD-10-CM

## 2024-04-25 DIAGNOSIS — K57.30 DIVERTICULOSIS OF LARGE INTESTINE W/OUT PERFORATION OR ABSCESS W/OUT BLEEDING: ICD-10-CM

## 2024-04-25 DIAGNOSIS — K64.8 OTHER HEMORRHOIDS: ICD-10-CM

## 2024-04-25 PROCEDURE — 99214 OFFICE O/P EST MOD 30 MIN: CPT

## 2024-04-25 RX ORDER — DEXLANSOPRAZOLE 60 MG/1
60 CAPSULE, DELAYED RELEASE ORAL
Refills: 0 | Status: DISCONTINUED | COMMUNITY
End: 2024-04-25

## 2024-04-26 NOTE — CONSULT LETTER
[FreeTextEntry1] : Dear Dr. Christian Joyner and Dr. Justice Lama,  I had the pleasure of seeing your patient ABHIJIT PETERS in the office today.  My office note is attached. PLEASE READ THE "ASSESSMENT" SECTION OF THE NOTE TO SEE MY IMPRESSION AND PLAN.  Thank you very much for allowing me to participate in the care of your patient.  Sincerely,  Bernardo Ramos M.D., FACG, FACP Director, Celiac Program at [facility]  of Medicine, [facility] [employer] at [facility] Adjunct  of Medicine, [facility] Practice Director, [facility] - Gastroenterology at [address] - [address] [place] [address] Tel: [phone] Email: [email or web address]   The attached note has been created using a voice recognition system (Dragon).  There may be some misspellings and typos.  Please call my office if you have any issues or questions.

## 2024-04-26 NOTE — HISTORY OF PRESENT ILLNESS
[FreeTextEntry1] : We reviewed the patient's EGD and colonoscopy performed on March 6, 2024.  EGD was significant for a 2 cm hiatal hernia with a mildly irregular Z-line.  Although the patient has previous evidence of Basilio's changes with intestinal metaplasia at the GE junction, the current biopsies did not show intestinal metaplasia, perhaps due to sampling error.  The patient is status post gastric bypass.  There were no other significant findings on endoscopic biopsies.  Colonoscopy was significant for removal of 3 polyps, 1 tubulovillous adenoma, 1 tubular adenoma, and 1 hyperplastic.  The patient also has mild diverticulosis involving the sigmoid, descending, transverse, and hepatic flexure.  He has internal and external hemorrhoids which are asymptomatic.  The patient is on Dexilant 60 mg in the morning and famotidine 40 mg in the evening.  He gets mild heartburn in the mornings.  He denies dysphagia.  He has occasional separate problems with swallowing related to Botox injections which she is receiving to his vocal cords.  He typically has 1-2 bowel movements a day although he gets occasional periods where he can go up to 4 to 5 days without a bowel movement.  He denies melena or bright red blood per rectum.   Note from 11/10/2023 - The patient has a history of a 2 cm hiatal hernia with reflux esophagitis and intestinal metaplasia at the GE junction.  He also has a history of adenomatous colonic polyps.  He has a history of gastric bypass.  He has had iron deficiency anemia which apparently has been stable as he is seeing hematology and has not required any further iron infusions for about 2 years.  He is on Dexilant 60 mg in the morning and famotidine 40 mg in the evening for reflux esophagitis.  He has also been taking a supplement "reflux Gourmet" which is alginate, which he states has helped him.  Since the patient's last visit on December 2, 2022, the patient had a normal CT enterography on December 14, 2022.  He underwent surgery in March for his bronchomalacia and had tracheal bronchoplasty.  He has since had a Botox injection.  He still gets heartburn despite his treatment.  He denies dysphagia, nausea, vomiting.  He gets occasional left upper quadrant pain.  He has 1-2 bowel movements a day and occasionally skips a day.  Stools are solid without melena or bright red blood per rectum.  The patient is on fiber supplementation.  The patient's weight is stable.   Note from 12/2/2022 - The patient has a history of a 2 cm hiatal hernia with intestinal metaplasia at the GE junction and a history of adenomatous colonic polyps.  He is on Dexilant in the morning and famotidine at bedtime.  He has been diagnosed with bronchomalacia and is short of breath.  He is being evaluated for surgery.  He reports that he has a diagnosis of iron deficiency anemia dating back over 6 years.  The patient has a history of gastric bypass.  He received 2 iron infusions in the past month.  He gets occasional heartburn especially if he eats late.  He denies dysphagia or abdominal pain.  He generally has a bowel movement every 2 to 3 days although gets explosive bowel movements 1-2 times a month.  He denies melena or bright red blood per rectum.  His last EGD and colonoscopy were in February 2021.   Note from 3/23/2021 - We reviewed the patient's EGD and colonoscopy performed on February 5, 2021.  EGD was significant for a 2 cm hiatal hernia.  The patient is status post gastric bypass.  Biopsies revealed intestinal metaplasia at the GE junction but were otherwise negative.  Colonoscopy was significant for removal of and appendiceal orifice polyp with negative biopsies.  The patient has mild to moderate diverticulosis involving the hepatic flexure, transverse colon, descending colon, and sigmoid.  Random right and left colonic biopsies were negative the patient also has internal and external hemorrhoids.  He is on Dexilant in the morning and famotidine at bedtime.  He states that 1-2 times a week, after his evening pills, he will get heartburn relieved with Tums.  He notes that his stools are improved with much less in the way of loose stools.  He is taking tramadol which constipates him.  He generally has 1 bowel movement a day and denies melena or bright red blood per rectum.  He has a history of adenomatous colonic polyps.   Note from 1/11/2021 - The patient has a history of adenomatous colonic polyps with a large polyp removed in a piecemeal fashion from the hepatic flexure on his last colonoscopy.  He also has a history of reflux esophagitis and gastric bypass.  He is currently recovering from COVID-19.  He was diagnosed on December 22 and had shortness of breath.  He was not hospitalized but was treated with IV antibodies, dexamethasone, and Zithromax for 5 days.  He denies abdominal pain.  He gets heartburn 2-3 times a week despite taking Dexilant in the morning and famotidine at bedtime.  He moves his bowels about once a day but states that 50% of his stools are loose with urgency while the other 50% of solid.  He denies melena or bright red blood per rectum.  The patient has not been admitted to the hospital in the past year and denies any cardiac issues.   Note from 6/10/2020 - We reviewed the evaluations done since the patient's last visit on February 12, 2020.  Blood work and stool testing was normal.  The patient underwent EGD and colonoscopy on March 10, 2020.  EGD revealed an irregular Z line with biopsy showing evidence of reflux esophagitis.  The patient is status post gastric bypass.  All the biopsies were negative.  Colonoscopy was significant for removal of 2 tubular adenomas including one that was large and flat at the hepatic flexure and required piecemeal polypectomy.  The patient also has diverticulosis in the transverse, descending, and sigmoid colon as well as internal and external hemorrhoids which are asymptomatic.  The patient was taking Dexilant in the evening and famotidine in the morning.  He notes heartburn/sour taste in the early morning and sometimes at night.  He works varying schedules and sometimes works nights .   Note from 2/12/2020 - The patient is a 54-year-old man with a history of [place] exposure and a history of a gastric bypass as well as a lap band who underwent laminectomy and lumbar fusion in July 2019 complicated by postoperative infection and then underwent cervical disc replacement and fusion in December.  Following his surgeries in July the patient noticed a change in his bowel habits.  He is now having 2-3 bowel movements at a time approximately 3-4 times a week which is bloody/soft in consistency.  He denies melena or bright red blood per rectum.  Patient is on Dexilant for chronic reflux.  He denies heartburn, dysphasia, or abdominal pain.  He has gained a little weight.  The patient was hospitalized last year for the above surgeries and the postoperative infection.  He denies any cardiac history.

## 2024-04-26 NOTE — REASON FOR VISIT
[FreeTextEntry1] : Tubulovillous adenoma of the colon, adenomatous colonic polyps, diverticulosis, hemorrhoids, hiatal hernia, history of Basilio's esophagus

## 2024-04-26 NOTE — ASSESSMENT
[FreeTextEntry1] : Patient with reflux who has a 2 cm hiatal hernia.  There was previous evidence of Basilio's changes on endoscopic biopsies but these were not seen on the current biopsies.  The patient has adenomatous colonic polyps including 1 tubulovillous adenoma.  He also has mild diverticulosis.  He is on Dexilant 60 mg in the morning and famotidine 40 mg in the evening.  He has a history of 9/11 exposure.  We will continue the patient's current medications.  Information was given to the patient regarding diverticulosis and a high-fiber diet.  We discussed this in depth.  Given the advanced nature of the colonic polyp, we will repeat the colonoscopy in 1 year that is March 2025.  Given the history of Basilio's changes, we will repeat an EGD in 3 years that is March 2027.  Patient will return to see me in 1 year or sooner if needed.   Plan from 11/10/2023 - Patient with a 2 cm hiatal hernia, reflux esophagitis, intestinal metaplasia at the GE junction, and a history of adenomatous colonic polyps.  He has a history of iron deficiency anemia as well, likely related to his gastric bypass.  He is on Dexilant 60 mg in the morning and famotidine 40 mg at bedtime along with an alginate supplement.  He continues to have heartburn.  He is otherwise stable although has significant hoarseness related to his bronchomalacia.  An EGD and colonoscopy have been scheduled. The risks, benefits, alternatives, and limitations of the procedures, including the possibility of missed lesions, were explained.  This will be done early next year.  The patient will require pulmonary and anesthesia clearance prior to the procedure.   Plan from 12/2/2022 - Patient with a 2 cm hiatal hernia, intestinal metaplasia at the GE junction, and a history of adenomatous colonic polyps. He has iron deficiency anemia and is recently received iron infusions. He has been diagnosed with bronchomalacia and is actively short of breath and is being considered for surgery. He has a history of gastric bypass. He is on Dexilant 60 mg in the morning and famotidine 40 mg at bedtime. He has constipation.    As the patient had EGD and colonoscopy in February 2021, especially in light of the patient's active respiratory issues, I will begin my evaluation of the iron deficiency anemia in the noninvasive manner. The patient was sent for a CT enterography, which will be able to visualize the bypassed segment of the GI tract as well as serve as a noninvasive means of evaluating the rest of the GI tract.    The patient was advised to eat start taking MiraLAX 17 g in 8 ounces of water daily. He will call me if his bowel movements do not improve.    Patient will continue Dexilant and famotidine.    Patient is due for EGD and colonoscopy in February 2024.      Plan from 3/23/2021 - Patient with reflux with a 2 cm hiatal hernia and intestinal metaplasia at the GE junction found on endoscopy. Colonoscopy was significant for diverticulosis. He has a history of adenomatous colonic polyps. He is on Dexilant in the morning and famotidine at bedtime.    Patient will continue his current medications.    Patient was counseled regarding the importance of a high-fiber diet.    We will repeat EGD and colonoscopy in 3 years that is February 2024.    Patient will return to see me in 1 year or sooner if needed.      Plan from 1/11/2021 - Patient with a history of adenomatous colonic polyps including a large one removed in a piecemeal fashion from the hepatic flexure, reflux esophagitis with ongoing symptoms despite taking Dexilant and famotidine, and frequent loose stools with urgency. The patient is currently recovering from COVID-19 infection.    We will plan on performing EGD and colonoscopy at the end of March. An EGD and colonoscopy will be scheduled. The risks, benefits, alternatives, and limitations of the procedures, including the possibility of missed lesions, were explained. The patient will check with his pulmonary doctor to see if there are any concerns regarding ongoing lung problems from COVID-19.      Plan from 6/10/2020 - Patient with evidence of reflux esophagitis on endoscopy. He had tubular adenomas including a large one that was removed in piecemeal fashion and also has diverticulosis.    I advised the patient to change the medication to Dexilant in the morning before breakfast and famotidine at bedtime.    A list of dietary and lifestyle modifications in the treatment of GERD was given to the patient.    Information was given to the patient regarding diverticulosis and a high-fiber diet.    Given the piecemeal polypectomy, we will repeat a colonoscopy in March 2021.      Plan from 2/12/2020 - Patient with a change in bowel habits with bloody/soft stools dating back to her surgeries last year. He also has chronic reflux and is doing well on Dexilant. He has a history of [place] exposure.    An EGD and colonoscopy have been scheduled. The risks, benefits, alternatives, and limitations of the procedures, including the possibility of missed lesions, were explained. The patient will require anesthesia clearance at [facility] prior to the procedure.    Bloodwork was sent for CBC, chem-pack, TSH, celiac markers.    Stool studies will be sent for a GI infectious PCR panel and C. diff by PCR.

## 2024-04-30 ENCOUNTER — APPOINTMENT (OUTPATIENT)
Dept: PULMONOLOGY | Facility: CLINIC | Age: 59
End: 2024-04-30
Payer: COMMERCIAL

## 2024-04-30 VITALS
DIASTOLIC BLOOD PRESSURE: 70 MMHG | BODY MASS INDEX: 26.06 KG/M2 | TEMPERATURE: 97.5 F | SYSTOLIC BLOOD PRESSURE: 118 MMHG | HEART RATE: 71 BPM | HEIGHT: 67 IN | OXYGEN SATURATION: 98 % | RESPIRATION RATE: 16 BRPM | WEIGHT: 166 LBS

## 2024-04-30 DIAGNOSIS — R06.02 SHORTNESS OF BREATH: ICD-10-CM

## 2024-04-30 DIAGNOSIS — R25.2 CRAMP AND SPASM: ICD-10-CM

## 2024-04-30 DIAGNOSIS — K21.00 GASTRO-ESOPHAGEAL REFLUX DISEASE WITH ESOPHAGITIS, WITHOUT BLEEDING: ICD-10-CM

## 2024-04-30 DIAGNOSIS — J30.9 ALLERGIC RHINITIS, UNSPECIFIED: ICD-10-CM

## 2024-04-30 DIAGNOSIS — J39.8 OTHER SPECIFIED DISEASES OF UPPER RESPIRATORY TRACT: ICD-10-CM

## 2024-04-30 DIAGNOSIS — R93.89 ABNORMAL FINDINGS ON DIAGNOSTIC IMAGING OF OTHER SPECIFIED BODY STRUCTURES: ICD-10-CM

## 2024-04-30 PROCEDURE — 99214 OFFICE O/P EST MOD 30 MIN: CPT | Mod: 25

## 2024-04-30 PROCEDURE — 94729 DIFFUSING CAPACITY: CPT

## 2024-04-30 PROCEDURE — 94010 BREATHING CAPACITY TEST: CPT

## 2024-04-30 PROCEDURE — ZZZZZ: CPT

## 2024-04-30 PROCEDURE — 95012 NITRIC OXIDE EXP GAS DETER: CPT

## 2024-04-30 PROCEDURE — 94727 GAS DIL/WSHOT DETER LNG VOL: CPT

## 2024-04-30 NOTE — ADDENDUM
[FreeTextEntry1] :  Documented by Yuli Olsen acting as a scribe for Dr. Justice Lama on 04/30/2024 .   All medical record entries made by the Scribe were at my, Dr. Justice Lama's direction and personally dictated by me on 04/30/2024 . I have reviewed the chart and agree that the record accurately reflects my personal performance of the history, Physical exam, assessment, and plan. I have also personally directed, reviewed, and agree with the discharge instructions.

## 2024-04-30 NOTE — ASSESSMENT
[FreeTextEntry1] : **********************ALL PRINTED SCRIPTS TO BE FILLED THROUGH [facility]D************************* Mr. Corrales is a 58 year old male who has a history of asthma, allergy, GERD, cervicale spine Dz (s/p surgery 12/19) and abnormal CT. He is s/p [employer] exposure - s/p complicated cervical spine surgery (12/2019) s/p COVID 19 12/2020- s/p THR (right) 5/2021- s/p COVID-19 vaccine reaction 12/2021 - +TBM right Bronchomalacia - s/p TBM surgery ([facility]) - post-op VC issues/ dry cough- SOB / HORVATH Acute Bronchitis / Asthmatic Bronchitis (resolved)-but vocal issues/periodic cough (Dystonia); s/p Botox for laryngospasm.    problem 1: abnormal chest CT - c/w inflammation - ?COVID-19 residue (improved -PPD Quantiferon gold (-), ESR / Hypersensitivity panel (wnl) -Follow up Chest CT 3/2021 - 9/2021, 3/2022, 3/2023, 8/2023, 11/2023- 2/2024, 2/2025  problem 2: asthma (semi-persistent) - (Active) -s/p Breztri 2 puffs BID = Symbicort/Spiriva/Asmanex - now on Trelegy 200 1 puff QD -continue to use Singulair 10 mg QHS -continue to use Xopenex PRN / Xopenex 0.63 via nebulizer q6H prn -s/p Prednisone 20 mg x 7 days, 10 mg x 7 days (Trial: 4/6/2023) Information sheet given to the patient to be reviewed, this medication is never to be used without consulting the prescribing physician. Proper dietary restraint is necessary specifically salt containing foods, if any reaction may occur should be reported. -Asthma is believed to be caused by inherited (genetic) and environmental factor, but its exact cause is unknown. Asthma may be triggered by allergens, lung infections, or irritants in the air. Asthma triggers are different for each person -Inhaler technique reviewed as well as oral hygiene techniques reviewed with patient. Avoidance of cold air, extremes of temperature, rescue inhaler should be used before exercise. Order of medication reviewed with patient. Recommended use of a cool mist humidifier in the bedroom.  problem 2A: Biologic evaluation - Script given for blood work: asthma profile, food IgE panel, eosinophil level, IgE level, Vitamin D level (NC) -Dupixent (Prezant) 7/2023 in place - The safety and efficacy of Nucala was established in three double-blind, randomized, placebo-controlled trials in patients with severe asthma. Compared to a placebo, patients with severe asthma receiving Nucala had fewer exacerbation requiring hospitalization and/or emergency department visits, and a longer time to first exacerbation. In addition, patients with severe asthma receiving Nucala or Fasenra experienced greater reductions in their daily maintenance oral corticosteroid dose, while maintaining asthma control compared with patients receiving placebo. Treatment with Nucala did not result in a significant improvement in lung function, as measured by the volume of air exhaled by patients in one second. The most common side effects include: headache, injection site reactions, back pain, weakness, and fatigue; hypersensitivity reactions can occur within hours or days including swelling of the face, mouth, and tongue, fainting, dizziness, hives, breathing problems, and rash; herpes zoster infections have occurred. The drug is a monoclonal antibody that inhibits interleukin-5 which helps regular eosinophils, a type of white blood cell that contributes to asthma. The over-production of eosinophils can cause inflammation in the lungs, increasing the frequency of asthma attacks. Patients must also take other medications, including high dose inhaled corticosteroids and at least one additional asthma drug.  problem 3: cough; ? Sensory neuropathic cough (BM) -off Amitriptyline 10 mg up to TID, prn (DC if able) -off Neurontin 300 Q8H -add  mg BID  -continue Promethazine DM prn Sensory neuropathic cough is an etiology of cough that is often realized once someone has been ruled out for common disease such as: asthma, COPD, eosinophilic bronchitis, bronchiectasis, post nasal drip, and GERD. It sometimes develops following a URI, herpes zoster outbreak in pharynx or thyroid or cervical spine injury. However, many patients have no identifiable antecedent explanation.  Problem 3A: +TBM right Bronchomalacia (s/p surgery 2/2023)- "nodule" -s/p Dynamic CT- CTS eval Dr Alvin Stubbs -next CT 2/2025 -Tracheomalacia is usually acquired in adults and common causes include damage by tracheostomy or endotracheal intubation damaging the tracheal cartilage with increase risk with multiple intubations, prolonged intubation, and concurrent high dose steroid therapy; external chest wall trauma and surgery; chronic compression of the trachea by benign etiologies (eg, benign mediastinal goiter) or malignancy; relapsing polychondritis; or recurrent infection. Tracheomalacia can be asymptomatic, however signs or symptoms can develop as the severity of the airway narrowing progresses with major symptoms include dyspnea, cough, and sputum retention. Other symptoms include severe paroxysms of coughing, wheezing or stridor, barking cough and may be exacerbated by forced expiration, cough, and valsalva maneuver. Tracheomalacia is diagnosed by a bronchoscopic visualization of dynamic airway collapse on dynamic chest CT. Therapy is warranted in symptomatic patients with severe tracheomalacia and includes surgical repair as tracheobronchoplasty. The patient was referred to Dr. Alvin Stubbs or Dr. Vinny Lilly, at [facility] for a surgical consult.  problem 4: allergic rhinitis -recommended to use "Navage" nasal rinse device -continue Claritin 10 mg QAM -continue to use Xyzal 5 mg QHS -discontinue Omnaris 1 sniff/nostril BID -continue Olopatadine 1 sniff each nostril BID (.6) -use Vicks Sinex nasal spray -follow up with Dr. Ok Islas - s/p turbinectomy -Environmental measures for allergies were encouraged including mattress and pillow cover, air purifier, and environmental controls.  Problem 4A: Laryngospasm / Dystonia -Speech Rx- [place] - Botox rx- [place]  -s/p Botox Rx ([facility]) -Lidocaine nebulizer 2% QHS -transition bethanechol 5 mg every 8 hours to Baclofen 10 mg premeal, QHS  Problem 4B: VC dysfunction / Dystonia -Diblasi / [place] ( [place])/ [place] -add Aerobika  problem 5: GERD (FEESST- c/w LPR) -continue to use Dexilant 60 mg before first meal -continue to use Pepcid 80 mg QHS -Rule of 2s: avoid eating too much, eating too late, eating too spicy, eating two hours before bed -Things to avoid including overeating, spicy foods, tight clothing, eating within three hours of bed, this list is not all inclusive. -For treatment of reflux, possible options discussed including diet control, H2 blockers, PPIs, as well as coating motility agents discussed as treatment options. Timing of meals and proximity of last meal to sleep were discussed. If symptoms persist, a formal gastrointestinal evaluation is needed.  problem 6: insomnia - medical marijuana -continue to use Ambien PRN -recommended good sleep hygiene -Good sleep hygiene was encouraged including avoiding watching television an hour before bed, keeping caffeine at a low, avoiding reading, television, or anything, in bed, no drinking any liquids three hours before bedtime, and only getting into bed when tired and ready for sleep.  problem 7: foot cramps (resolved) -continue to use MyoCalm PM  Problem 8a: s/p COVID 19 infection 12/2020 (residual Sx) -Recommended not to get an additional COVID-19 booster at this time until it is updated against the current variants. If planning on travelling, obtaining another booster within 2 weeks of departure is recommended. -Covid 19 preclusions, vaccine and booster discussed at length and recommended - S/p Covid 19 vaccine (Moderna) x3 (12/2021) -s/p MAB infusion -educated patient on COVID 19 vaccine and appropriate recommendations -vaccine pending COVID-19 precautionary Immune Support Recommendations: -OTC Vitamin C 1000mg BID -OTC Quercetin 500mg BID -OTC Zinc 50 mg per day -OTC Melatonin 5 mg a night -OTC Vitamin D 2000mg per day -OTC Tonic Water 8oz per day -Water 0.5-1 gallon per day Additional Support Supplements: -Liposomal Glutathione 500 mg BID -SPM 1 tablet BID -Berberine 1000 mg BID - mg BID  problem 8: health maintenance -PPD negative as of 9/20/2021 -received flu shot in - 9/2023 -recommended strep pneumonia vaccines: Prevnar-13 vaccine, followed by Pneumo vaccine 23 on year following -recommended early intervention for URIs -recommended osteoporosis evaluations -recommended early dermatological evaluations -recommended after the age of 50 to the age of 70, colonoscopy every 5 years -encouraged early intervention  F/P in 4 months He is encouraged to call with any changes, concerns, or questions. ***********************ALL PRINTED SCRIPTS TO BE FILLED THROUGH [facility]D*******************************  *********************PRE PROCEDURE CLEARANCE FOR ENDOSCOPY*********************** -at this point in time there are no absolute pulmonary contraindications to go forward with the planned procedure

## 2024-04-30 NOTE — PROCEDURE
[FreeTextEntry1] : PFT([facility]) reveals normal flows; FEV1 was 3.59 L which is 110.5 % of predicted with a abnormal inspiratory limb. PFT's for performed to evaluate for SOB.  Full PFT reveals moderate obstructive dysfunction; FEV1 was 1.92 L which is 59 % of predicted, normal lung volumes, mild diffusions, at 14.7 L which is 66% predicted, abnormal inspiratory limb. PFT's for performed to evaluate for SOB.   Feno was unable to be performed.

## 2024-04-30 NOTE — HISTORY OF PRESENT ILLNESS
[FreeTextEntry1] : Mr. Corrales is a 58 year old male with a history of allergic rhinitis, asthma, abnormal chest CT, and GERD presenting to the office today for a follow up pulmonary evaluation. His chief complaint is  -he notes dysphonia -he notes sob on exertion -he notes his weight is stable  -he notes some constipation -he notes poor appetite -he notes exercising is difficult due to hip issues -he notes GERD is still an issue -he notes still getting dry eyes due to medication and uses dry eye drops -he notes his sleep is poor  -no new medications, vitamins, or supplements -he notes getting Botox -He notes Endoscopy revealed he has Barretts disease -he notes mucus and drooling   -Patient denies any headaches, nausea, vomiting, fever, chills, sweats, chest pain, chest pressure, palpitations, coughing, wheezing, fatigue, diarrhea, dysphagia, arthralgias, myalgias, dizziness, leg swelling, leg pain, itchy eyes, itchy ears, heartburn, or sour taste in mouth.

## 2024-06-13 ENCOUNTER — APPOINTMENT (OUTPATIENT)
Dept: THORACIC SURGERY | Facility: CLINIC | Age: 59
End: 2024-06-13
Payer: COMMERCIAL

## 2024-06-13 ENCOUNTER — NON-APPOINTMENT (OUTPATIENT)
Age: 59
End: 2024-06-13

## 2024-06-13 VITALS
SYSTOLIC BLOOD PRESSURE: 120 MMHG | BODY MASS INDEX: 25.01 KG/M2 | RESPIRATION RATE: 17 BRPM | WEIGHT: 165 LBS | OXYGEN SATURATION: 100 % | DIASTOLIC BLOOD PRESSURE: 80 MMHG | HEART RATE: 67 BPM | HEIGHT: 68 IN

## 2024-06-13 DIAGNOSIS — J98.09 OTHER DISEASES OF BRONCHUS, NOT ELSEWHERE CLASSIFIED: ICD-10-CM

## 2024-06-13 PROCEDURE — 99213 OFFICE O/P EST LOW 20 MIN: CPT

## 2024-06-13 PROCEDURE — G2211 COMPLEX E/M VISIT ADD ON: CPT

## 2024-06-13 RX ORDER — FLUTICASONE FUROATE, UMECLIDINIUM BROMIDE AND VILANTEROL TRIFENATATE 100; 62.5; 25 UG/1; UG/1; UG/1
100-62.5-25 POWDER RESPIRATORY (INHALATION)
Refills: 0 | Status: ACTIVE | COMMUNITY

## 2024-06-13 NOTE — ASSESSMENT
[FreeTextEntry1] : Mr. 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 Justice Lama.  Now s/p a Flex Bronch, Rt VATS R.A. MLND, tracheobronchoplasty on 2/14/23. Path of Levels 11, 7, and 10 LNs all reactive.  I have reviewed the patient's medical records and diagnostic images at time of this office consultation and have made the following recommendation: 1. CT scan reviewed and discussed with patient, unchanged lung nodule. Advise patient to continue follow up with Dr. Lama (pulm) and return to clinic on prn basis.    I, ANGELIA George, personally performed the evaluation and management (E/M) services for this established patient who presents today with (a) new problem(s)/exacerbation of (an) existing condition(s).  That E/M includes conducting the examination, assessing all new/exacerbated conditions, and establishing a new plan of care.  Today, my ACP, BASSAM Khalil, was here to observe my evaluation and management services for this new problem/exacerbated condition to be followed going forward.

## 2024-06-13 NOTE — CONSULT LETTER
[FreeTextEntry2] : Dr. Justice Lama (Pulm/Referring) [FreeTextEntry3] : Alvin Stubbs MD, MPH \par  System Director of Thoracic Surgery \par  Director of Comprehensive Lung and Foregut [place] \par  Professor Cardiovascular & Thoracic Surgery  \par  [facility] [employer] at [facility]\par  \par  [facility]\par  [address]\par  Oncology [address]\[place] [address]\par  Tel: [phone]\par  Fax: [phone]\par

## 2024-06-13 NOTE — HISTORY OF PRESENT ILLNESS
[FreeTextEntry1] : Mr. 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 Justice Lama.  Now s/p awake bronchoscopy on 1/12/23. Upon deep exhalation, I was able to appreciate approximately 70% posterior collapse of the distal trachea and 70% collapse of the right mainstem bronchus. There was approximately 60% collapse of the left mainstem bronchus.  Now s/p Flex Bronch, Rt VATS R.A. MLND, tracheobronchoplasty on 2/14/23. Path of Levels 11, 7, and 10 LNs all reactive.  CXR on 6/29/23: - Cardiomediastinal silhouette is within normal limits. - Lungs are clear; No pleural effusion or pneumothorax. - No acute bony findings; Cervical fusion. - S/P gastric bypass surgery with gastric lap band present. S/P cholecystectomy.  CT Chest on 11/13/23 (compared to CT Chest in Aug 2023): - 4 mm right lower lobe nodule (2, 38) is new from the prior study as is ill-defined right upper lobe groundglass and nodular opacity.  - Other bilateral patchy opacities are decreased from the prior study.  CT chest on 02/21/2024: - 4 mm right lower lobe solid nodule (series 2, image 43) is unchanged since November 13, 2023 - few subcentimeter pulmonary nodules are also unchanged including a 2 mm left upper lobe nodule (series 2, image 73) - stable mild peripheral groundglass nodular opacities in the right upper and right middle lobes. - gastric band.   - status post cholecystectomy.   Patient is here today for follow up. He reports of chronic voice hoarseness s/p botox 3/2024, admits to SOB 2/2 to asthma.

## 2024-06-15 ENCOUNTER — NON-APPOINTMENT (OUTPATIENT)
Age: 59
End: 2024-06-15

## 2024-06-20 ENCOUNTER — APPOINTMENT (OUTPATIENT)
Dept: THORACIC SURGERY | Facility: CLINIC | Age: 59
End: 2024-06-20

## 2024-08-13 ENCOUNTER — APPOINTMENT (OUTPATIENT)
Dept: CT IMAGING | Facility: CLINIC | Age: 59
End: 2024-08-13
Payer: COMMERCIAL

## 2024-08-13 PROCEDURE — 71250 CT THORAX DX C-: CPT | Mod: 26

## 2024-08-19 ENCOUNTER — APPOINTMENT (OUTPATIENT)
Dept: PULMONOLOGY | Facility: CLINIC | Age: 59
End: 2024-08-19
Payer: COMMERCIAL

## 2024-08-19 VITALS
RESPIRATION RATE: 16 BRPM | WEIGHT: 165 LBS | HEART RATE: 71 BPM | TEMPERATURE: 97.2 F | DIASTOLIC BLOOD PRESSURE: 64 MMHG | SYSTOLIC BLOOD PRESSURE: 100 MMHG | OXYGEN SATURATION: 98 % | BODY MASS INDEX: 25.01 KG/M2 | HEIGHT: 68 IN

## 2024-08-19 DIAGNOSIS — J45.909 UNSPECIFIED ASTHMA, UNCOMPLICATED: ICD-10-CM

## 2024-08-19 DIAGNOSIS — R93.89 ABNORMAL FINDINGS ON DIAGNOSTIC IMAGING OF OTHER SPECIFIED BODY STRUCTURES: ICD-10-CM

## 2024-08-19 DIAGNOSIS — J30.9 ALLERGIC RHINITIS, UNSPECIFIED: ICD-10-CM

## 2024-08-19 DIAGNOSIS — Z23 ENCOUNTER FOR IMMUNIZATION: ICD-10-CM

## 2024-08-19 DIAGNOSIS — J39.8 OTHER SPECIFIED DISEASES OF UPPER RESPIRATORY TRACT: ICD-10-CM

## 2024-08-19 DIAGNOSIS — R06.02 SHORTNESS OF BREATH: ICD-10-CM

## 2024-08-19 DIAGNOSIS — K21.00 GASTRO-ESOPHAGEAL REFLUX DISEASE WITH ESOPHAGITIS, WITHOUT BLEEDING: ICD-10-CM

## 2024-08-19 PROCEDURE — 90715 TDAP VACCINE 7 YRS/> IM: CPT

## 2024-08-19 PROCEDURE — 94010 BREATHING CAPACITY TEST: CPT

## 2024-08-19 PROCEDURE — 90471 IMMUNIZATION ADMIN: CPT

## 2024-08-19 PROCEDURE — 99214 OFFICE O/P EST MOD 30 MIN: CPT | Mod: 25

## 2024-08-19 NOTE — PROCEDURE
[FreeTextEntry1] : PFTs revealed mild restrictive and moderate obstructive dysfunction; FEV1 was 1.63L, which is 50% of predicted; abnormal inspiratory limb. PFTs were performed to evaluate for SOB, asthma

## 2024-08-19 NOTE — ADDENDUM
[FreeTextEntry1] : Documented by Merissa Stoner acting as a scribe for Dr. Justice Lama on 08/19/2024. All medical record entries made by the Scribe were at my, Dr. Justice Lama's, direction and personally dictated by me on 08/19/2024. I have reviewed the chart and agree that the record accurately reflects my personal performance of the history, physical exam, assessment and plan. I have also personally directed, reviewed, and agree with the discharge instructions.

## 2024-08-19 NOTE — HISTORY OF PRESENT ILLNESS
[FreeTextEntry1] : Mr. Corrales is a 58 year old male with a history of allergic rhinitis, asthma, abnormal chest CT, and GERD presenting to the office today for a follow-up pulmonary evaluation. His chief complaint is  -he notes he had a CT scan 8/13/2024 -he notes bowels are regular  -he notes dysphagia due to throat soreness. He's following up with Dr. Hickey, who's giving him injections. His last appointment was the end of 7/2024 -he notes slight breathing issues with the humidity -he notes itchy eyes, slightly dry -he notes back stiffness and residual hip pain s/p hip surgery. He went to pain management, and had spinal injections as well as 5 trigger point injections starting from his shoulder. He still has soreness just above his pelvis, not debilitating. -he notes exercising (walking in the pool 50 labs, swimming 15 laps). He has SOB with swimming, but not with the walking in the pool. -he notes he lost some weight -he notes he's still using Trelegy -he notes he's on Dupixent and tolerating it well -he notes he has a pending appointment with Dr. 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

## 2024-08-19 NOTE — ASSESSMENT
[FreeTextEntry1] : **********************ALL PRINTED SCRIPTS TO BE FILLED THROUGH [facility] FUND************************* Mr. Corrales is a 58 year old male who has a history of asthma, allergy, GERD, cervicale spine Dz (s/p surgery 12/19) and abnormal CT. He is s/p [facility] exposure - s/p complicated cervical spine surgery (12/2019) s/p COVID 19 12/2020- s/p THR (right) 5/2021- s/p COVID-19 vaccine reaction 12/2021 - +TBM right Bronchomalacia - s/p TBM surgery (Enoc) - post-op VC issues/ dry cough- SOB / HORVATH Acute Bronchitis / Asthmatic Bronchitis (resolved)-but vocal issues/periodic cough (Dystonia); s/p Botox for laryngospasm (Edelmira)   problem 1: abnormal chest CT - c/w inflammation - ?COVID-19 residua (improved) -PPD Quantiferon gold (-), ESR / Hypersensitivity panel (wnl) -Follow up Chest CT 3/2021 - 9/2021, 3/2022, 3/2023, 8/2023, 11/2023- 2/2024, 8/2024 (pending result)  problem 2: asthma (semi-persistent) - (Active) -continue Trelegy 200 1 puff QD -continue to use Singulair 10 mg QHS -continue to use Xopenex PRN / Xopenex 0.63 via nebulizer q6H prn -s/p Prednisone 20 mg x 7 days, 10 mg x 7 days (Trial: 4/6/2023) Information sheet given to the patient to be reviewed, this medication is never to be used without consulting the prescribing physician. Proper dietary restraint is necessary specifically salt containing foods, if any reaction may occur should be reported. -Asthma is believed to be caused by inherited (genetic) and environmental factor, but its exact cause is unknown. Asthma may be triggered by allergens, lung infections, or irritants in the air. Asthma triggers are different for each person -Inhaler technique reviewed as well as oral hygiene techniques reviewed with patient. Avoidance of cold air, extremes of temperature, rescue inhaler should be used before exercise. Order of medication reviewed with patient. Recommended use of a cool mist humidifier in the bedroom.  problem 2A: Biologic evaluation - Script given for blood work: asthma profile, food IgE panel, eosinophil level, IgE level, Vitamin D level (NC) -Dupixent (Prezant) 7/2023 in place - The safety and efficacy of Nucala was established in three double-blind, randomized, placebo-controlled trials in patients with severe asthma. Compared to a placebo, patients with severe asthma receiving Nucala had fewer exacerbation requiring hospitalization and/or emergency department visits, and a longer time to first exacerbation. In addition, patients with severe asthma receiving Nucala or Fasenra experienced greater reductions in their daily maintenance oral corticosteroid dose, while maintaining asthma control compared with patients receiving placebo. Treatment with Nucala did not result in a significant improvement in lung function, as measured by the volume of air exhaled by patients in one second. The most common side effects include: headache, injection site reactions, back pain, weakness, and fatigue; hypersensitivity reactions can occur within hours or days including swelling of the face, mouth, and tongue, fainting, dizziness, hives, breathing problems, and rash; herpes zoster infections have occurred. The drug is a monoclonal antibody that inhibits interleukin-5 which helps regular eosinophils, a type of white blood cell that contributes to asthma. The over-production of eosinophils can cause inflammation in the lungs, increasing the frequency of asthma attacks. Patients must also take other medications, including high dose inhaled corticosteroids and at least one additional asthma drug.  problem 3: cough; ? Sensory neuropathic cough (BM) -off Amitriptyline 10 mg up to TID, prn (DC if able) -off Neurontin 300 Q8H -continue  mg BID  -continue Promethazine DM prn Sensory neuropathic cough is an etiology of cough that is often realized once someone has been ruled out for common disease such as: asthma, COPD, eosinophilic bronchitis, bronchiectasis, post nasal drip, and GERD. It sometimes develops following a URI, herpes zoster outbreak in pharynx or thyroid or cervical spine injury. However, many patients have no identifiable antecedent explanation.  Problem 3A: +TBM right Bronchomalacia (s/p surgery 2/2023)- "nodule" -s/p Dynamic CT- CTS eval Dr Alvin Stubbs -pending CT 8/2024 -Tracheomalacia is usually acquired in adults and common causes include damage by tracheostomy or endotracheal intubation damaging the tracheal cartilage with increase risk with multiple intubations, prolonged intubation, and concurrent high dose steroid therapy; external chest wall trauma and surgery; chronic compression of the trachea by benign etiologies (eg, benign mediastinal goiter) or malignancy; relapsing polychondritis; or recurrent infection. Tracheomalacia can be asymptomatic, however signs or symptoms can develop as the severity of the airway narrowing progresses with major symptoms include dyspnea, cough, and sputum retention. Other symptoms include severe paroxysms of coughing, wheezing or stridor, barking cough and may be exacerbated by forced expiration, cough, and valsalva maneuver. Tracheomalacia is diagnosed by a bronchoscopic visualization of dynamic airway collapse on dynamic chest CT. Therapy is warranted in symptomatic patients with severe tracheomalacia and includes surgical repair as tracheobronchoplasty. The patient was referred to Dr. Alvin Stubbs or Dr. Vinny Lilly, at [facility] for a surgical consult.  problem 4: allergic rhinitis -recommended to use "Navage" nasal rinse device -continue Claritin 10 mg QAM -continue to use Xyzal 5 mg QHS -discontinue Omnaris 1 sniff/nostril BID -continue Olopatadine 1 sniff each nostril BID (.6) -use Vicks Sinex nasal spray -follow up with Dr. Ok Islsa - s/p turbinectomy -Environmental measures for allergies were encouraged including mattress and pillow cover, air purifier, and environmental controls.  Problem 4A: Laryngospasm / Dystonia -Speech Rx- Edelmira - Botox rx- Edelmira  -s/p Botox Rx (gHI) -Lidocaine nebulizer 2% QHS -transition bethanechol 5 mg every 8 hours to Baclofen 10 mg premeal, QHS  Problem 4B: VC dysfunction / Dystonia -Diblasi / [place] ( [place])/ Edelmira -add Aerobika  problem 5: GERD (FEESST- c/w LPR) -continue to use Dexilant 60 mg before first meal -continue to use Pepcid 80 mg QHS -Rule of 2s: avoid eating too much, eating too late, eating too spicy, eating two hours before bed -Things to avoid including overeating, spicy foods, tight clothing, eating within three hours of bed, this list is not all inclusive. -For treatment of reflux, possible options discussed including diet control, H2 blockers, PPIs, as well as coating motility agents discussed as treatment options. Timing of meals and proximity of last meal to sleep were discussed. If symptoms persist, a formal gastrointestinal evaluation is needed.  problem 6: insomnia - medical marijuana -continue to use Ambien PRN -recommended good sleep hygiene -Good sleep hygiene was encouraged including avoiding watching television an hour before bed, keeping caffeine at a low, avoiding reading, television, or anything, in bed, no drinking any liquids three hours before bedtime, and only getting into bed when tired and ready for sleep.  problem 7: foot cramps (resolved) -continue to use MyoCalm PM  Problem 8a: s/p COVID 19 infection 12/2020 (residual Sx) -Recommended not to get an additional COVID-19 booster at this time until it is updated against the current variants. If planning on travelling, obtaining another booster within 2 weeks of departure is recommended. -Covid 19 preclusions, vaccine and booster discussed at length and recommended - S/p Covid 19 vaccine (Moderna) x3 (12/2021) -s/p MAB infusion -educated patient on COVID 19 vaccine and appropriate recommendations -vaccine pending COVID-19 precautionary Immune Support Recommendations: -OTC Vitamin C 1000mg BID -OTC Quercetin 500mg BID -OTC Zinc 50 mg per day -OTC Melatonin 5 mg a night -OTC Vitamin D 2000mg per day -OTC Tonic Water 8oz per day -Water 0.5-1 gallon per day Additional Support Supplements: -Liposomal Glutathione 500 mg BID -SPM 1 tablet BID -Berberine 1000 mg BID - mg BID  problem 8: health maintenance -TDaP vaccine given in office 08/19/2024  -PPD negative as of 9/20/2021 -received flu shot in - 9/2023 -recommended strep pneumonia vaccines: Prevnar-13 vaccine, followed by Pneumo vaccine 23 on year following -recommended early intervention for URIs -recommended osteoporosis evaluations -recommended early dermatological evaluations -recommended after the age of 50 to the age of 70, colonoscopy every 5 years -encouraged early intervention  F/P in 4 months He is encouraged to call with any changes, concerns, or questions. ***********************ALL PRINTED SCRIPTS TO BE FILLED THROUGH [facility] FUND*******************************

## 2024-08-19 NOTE — PHYSICAL EXAM
[No Acute Distress] : no acute distress [Normal Oropharynx] : normal oropharynx [Normal Appearance] : normal appearance [No Neck Mass] : no neck mass [Normal Rate/Rhythm] : normal rate/rhythm [Normal S1, S2] : normal s1, s2 [No Murmurs] : no murmurs [No Resp Distress] : no resp distress [Clear to Auscultation Bilaterally] : clear to auscultation bilaterally [Kyphosis] : kyphosis [Benign] : benign [Normal Gait] : normal gait [No Clubbing] : no clubbing [No Cyanosis] : no cyanosis [No Edema] : no edema [FROM] : FROM [Normal Color/ Pigmentation] : normal color/ pigmentation [No Focal Deficits] : no focal deficits [Oriented x3] : oriented x3 [Normal Affect] : normal affect [III] : Mallampati Class: III [TextBox_68] : I:E ratio 1:3; clear

## 2024-12-06 ENCOUNTER — APPOINTMENT (OUTPATIENT)
Dept: GASTROENTEROLOGY | Facility: CLINIC | Age: 59
End: 2024-12-06
Payer: COMMERCIAL

## 2024-12-06 VITALS
HEIGHT: 68 IN | TEMPERATURE: 97.5 F | BODY MASS INDEX: 25.08 KG/M2 | SYSTOLIC BLOOD PRESSURE: 110 MMHG | DIASTOLIC BLOOD PRESSURE: 62 MMHG | OXYGEN SATURATION: 98 % | WEIGHT: 165.5 LBS | HEART RATE: 86 BPM

## 2024-12-06 DIAGNOSIS — Z86.0101 PERSONAL HISTORY OF ADENOMATOUS AND SERRATED COLON POLYPS: ICD-10-CM

## 2024-12-06 DIAGNOSIS — K21.00 GASTRO-ESOPHAGEAL REFLUX DISEASE WITH ESOPHAGITIS, WITHOUT BLEEDING: ICD-10-CM

## 2024-12-06 DIAGNOSIS — K22.70 BARRETT'S ESOPHAGUS W/OUT DYSPLASIA: ICD-10-CM

## 2024-12-06 DIAGNOSIS — K44.9 DIAPHRAGMATIC HERNIA W/OUT OBSTRUCTION OR GANGRENE: ICD-10-CM

## 2024-12-06 PROCEDURE — 99213 OFFICE O/P EST LOW 20 MIN: CPT

## 2024-12-26 ENCOUNTER — APPOINTMENT (OUTPATIENT)
Dept: PULMONOLOGY | Facility: CLINIC | Age: 59
End: 2024-12-26
Payer: COMMERCIAL

## 2024-12-26 VITALS
WEIGHT: 178 LBS | OXYGEN SATURATION: 98 % | HEIGHT: 68 IN | RESPIRATION RATE: 16 BRPM | HEART RATE: 77 BPM | DIASTOLIC BLOOD PRESSURE: 80 MMHG | SYSTOLIC BLOOD PRESSURE: 119 MMHG | TEMPERATURE: 98 F | BODY MASS INDEX: 26.98 KG/M2

## 2024-12-26 DIAGNOSIS — J45.909 UNSPECIFIED ASTHMA, UNCOMPLICATED: ICD-10-CM

## 2024-12-26 DIAGNOSIS — R93.89 ABNORMAL FINDINGS ON DIAGNOSTIC IMAGING OF OTHER SPECIFIED BODY STRUCTURES: ICD-10-CM

## 2024-12-26 DIAGNOSIS — J30.9 ALLERGIC RHINITIS, UNSPECIFIED: ICD-10-CM

## 2024-12-26 DIAGNOSIS — R06.02 SHORTNESS OF BREATH: ICD-10-CM

## 2024-12-26 DIAGNOSIS — J39.8 OTHER SPECIFIED DISEASES OF UPPER RESPIRATORY TRACT: ICD-10-CM

## 2024-12-26 DIAGNOSIS — J98.09 OTHER DISEASES OF BRONCHUS, NOT ELSEWHERE CLASSIFIED: ICD-10-CM

## 2024-12-26 PROCEDURE — 94010 BREATHING CAPACITY TEST: CPT

## 2024-12-26 PROCEDURE — 99214 OFFICE O/P EST MOD 30 MIN: CPT | Mod: 25

## 2024-12-26 PROCEDURE — 94799 UNLISTED PULMONARY SVC/PX: CPT

## 2024-12-26 PROCEDURE — ZZZZZ: CPT

## 2025-01-29 NOTE — DISCHARGE NOTE PROVIDER - NSDCHOSPICE_GEN_A_CORE
Problem: PHYSICAL THERAPY ADULT  Goal: Performs mobility at highest level of function for planned discharge setting.  See evaluation for individualized goals.  Description: Treatment/Interventions: Functional transfer training, LE strengthening/ROM, Therapeutic exercise, Endurance training, Cognitive reorientation, Patient/family training, Equipment eval/education, Bed mobility, Gait training, Compensatory technique education          See flowsheet documentation for full assessment, interventions and recommendations.  Outcome: Not Progressing  Note: Prognosis: Fair  Problem List: Decreased strength, Decreased endurance, Impaired balance, Decreased mobility, Decreased coordination, Decreased cognition, Impaired judgement, Decreased safety awareness  Assessment: pt began tx session lying supine in the bed as pt was agreeable to participate in PT intervention. Care coordination with OT Ngoc due to pt anticipated level of assistance for bed mobility, functional transfers and possible ambulation. pt continues to remain consistant fro requiring max Ax1 for completing a supine<>sit EOB tranfer with LE and trunk management. While seated EOB pt level of assistance for sitting balance varied frrom close /s to mod Ax1 as pt did demonstrate a posterior lean. pt continues to require mod ax2 for all STS w/ RW and max Ax2 for completing a SPT from EOB to recliner with RW. pt was able to increase static standing tolerance as pt stood 1 minute w/ Ax2 as RN completed a rectal temp. pt did require a seated therapeutic rest break before attempting to transfer to recliner due to fatigue and generalized weakness. While completing SPT w/ RW from EOB to recliner pt required max Ax2 with VC's for lateral stepping and mod Ax1 for assistance w/ RW maangement. pt continues to be at an increased risk for falls and injuries with all OOB activities at this time as pt would benefit from continued skilled PT intervention in order to reduce  patients risk for falls and injuries. Continue to recommend DC w/ level 2 moderate rehab resource intensity when medically cleared  Barriers to Discharge: Inaccessible home environment     Rehab Resource Intensity Level, PT: II (Moderate Resource Intensity)    See flowsheet documentation for full assessment.         No

## 2025-02-06 ENCOUNTER — TRANSCRIPTION ENCOUNTER (OUTPATIENT)
Age: 60
End: 2025-02-06

## 2025-02-25 NOTE — PATIENT PROFILE ADULT - FUNCTIONAL ASSESSMENT - BASIC MOBILITY 6.
Medical Necessity Clause: This procedure was medically necessary because the lesions that were treated were: Total Volume (Ccs): 0.3 Kenalog Preparation: Kenalog Validate Note Data When Using Inventory: Yes Bill For Wasted Drug (Kenalog)?: no How Many Mls Were Removed From The 40 Mg/Ml (5ml) Vial When Preparing The Injectable Solution?: 0 Ndc# For Kenalog Only: [id number] Show Inventory Tab: Hide Concentration Of Kenalog Solution Injected (Mg/Ml): 5.0 Kenalog Type Of Vial: Multiple Dose Consent: The risks of atrophy were reviewed with the patient. Detail Level: Detailed Administered By (Optional): Dr. Pierre Treatment Number (Optional): 1 4 = No assist / stand by assistance

## 2025-03-28 ENCOUNTER — APPOINTMENT (OUTPATIENT)
Dept: GASTROENTEROLOGY | Facility: AMBULATORY MEDICAL SERVICES | Age: 60
End: 2025-03-28
Payer: COMMERCIAL

## 2025-03-28 PROCEDURE — 45378 DIAGNOSTIC COLONOSCOPY: CPT | Mod: 33

## 2025-03-28 PROCEDURE — 43239 EGD BIOPSY SINGLE/MULTIPLE: CPT | Mod: 59

## 2025-04-15 ENCOUNTER — NON-APPOINTMENT (OUTPATIENT)
Age: 60
End: 2025-04-15

## 2025-05-19 ENCOUNTER — NON-APPOINTMENT (OUTPATIENT)
Age: 60
End: 2025-05-19

## 2025-05-21 ENCOUNTER — APPOINTMENT (OUTPATIENT)
Dept: PULMONOLOGY | Facility: CLINIC | Age: 60
End: 2025-05-21
Payer: COMMERCIAL

## 2025-05-21 VITALS
OXYGEN SATURATION: 98 % | DIASTOLIC BLOOD PRESSURE: 64 MMHG | TEMPERATURE: 96.6 F | SYSTOLIC BLOOD PRESSURE: 110 MMHG | BODY MASS INDEX: 25.46 KG/M2 | HEIGHT: 68 IN | HEART RATE: 72 BPM | RESPIRATION RATE: 16 BRPM | WEIGHT: 168 LBS

## 2025-05-21 DIAGNOSIS — R93.89 ABNORMAL FINDINGS ON DIAGNOSTIC IMAGING OF OTHER SPECIFIED BODY STRUCTURES: ICD-10-CM

## 2025-05-21 DIAGNOSIS — J30.9 ALLERGIC RHINITIS, UNSPECIFIED: ICD-10-CM

## 2025-05-21 DIAGNOSIS — R06.02 SHORTNESS OF BREATH: ICD-10-CM

## 2025-05-21 DIAGNOSIS — J98.09 OTHER DISEASES OF BRONCHUS, NOT ELSEWHERE CLASSIFIED: ICD-10-CM

## 2025-05-21 DIAGNOSIS — K21.00 GASTRO-ESOPHAGEAL REFLUX DISEASE WITH ESOPHAGITIS, WITHOUT BLEEDING: ICD-10-CM

## 2025-05-21 DIAGNOSIS — J45.909 UNSPECIFIED ASTHMA, UNCOMPLICATED: ICD-10-CM

## 2025-05-21 PROCEDURE — ZZZZZ: CPT

## 2025-05-21 PROCEDURE — 94727 GAS DIL/WSHOT DETER LNG VOL: CPT

## 2025-05-21 PROCEDURE — 94729 DIFFUSING CAPACITY: CPT

## 2025-05-21 PROCEDURE — 94799 UNLISTED PULMONARY SVC/PX: CPT

## 2025-05-21 PROCEDURE — 94010 BREATHING CAPACITY TEST: CPT

## 2025-05-21 PROCEDURE — 99214 OFFICE O/P EST MOD 30 MIN: CPT | Mod: 25

## 2025-05-21 RX ORDER — BACLOFEN 20 MG/1
20 TABLET ORAL
Qty: 360 | Refills: 1 | Status: ACTIVE | COMMUNITY
Start: 2025-05-21 | End: 1900-01-01

## 2025-06-05 ENCOUNTER — APPOINTMENT (OUTPATIENT)
Dept: GASTROENTEROLOGY | Facility: CLINIC | Age: 60
End: 2025-06-05
Payer: COMMERCIAL

## 2025-06-05 VITALS
BODY MASS INDEX: 25.46 KG/M2 | SYSTOLIC BLOOD PRESSURE: 122 MMHG | HEIGHT: 68 IN | WEIGHT: 168 LBS | OXYGEN SATURATION: 98 % | DIASTOLIC BLOOD PRESSURE: 60 MMHG | HEART RATE: 71 BPM | TEMPERATURE: 97.7 F

## 2025-06-05 DIAGNOSIS — K21.00 GASTRO-ESOPHAGEAL REFLUX DISEASE WITH ESOPHAGITIS, WITHOUT BLEEDING: ICD-10-CM

## 2025-06-05 DIAGNOSIS — K57.30 DIVERTICULOSIS OF LARGE INTESTINE W/OUT PERFORATION OR ABSCESS W/OUT BLEEDING: ICD-10-CM

## 2025-06-05 DIAGNOSIS — K64.4 RESIDUAL HEMORRHOIDAL SKIN TAGS: ICD-10-CM

## 2025-06-05 DIAGNOSIS — K44.9 DIAPHRAGMATIC HERNIA W/OUT OBSTRUCTION OR GANGRENE: ICD-10-CM

## 2025-06-05 DIAGNOSIS — K64.8 OTHER HEMORRHOIDS: ICD-10-CM

## 2025-06-05 DIAGNOSIS — Z86.0101 PERSONAL HISTORY OF ADENOMATOUS AND SERRATED COLON POLYPS: ICD-10-CM

## 2025-06-05 PROCEDURE — 99213 OFFICE O/P EST LOW 20 MIN: CPT

## 2025-06-05 RX ORDER — FOLIC ACID 20 MG
CAPSULE ORAL
Refills: 0 | Status: ACTIVE | COMMUNITY

## 2025-06-20 NOTE — H&P PST ADULT - ASSESSMENT
Infant admitted to NICU bed #5. Accompanied by AXEL LOMELI  
No
57 year old male PMH of HTN, GERD, obesity s/p gastric by pass 2002, lap banding (2008) PTSD, Asthma, lung nodules, insomnia, spondylolisthesis lumbar region, S/P Lumbar 3 laminectomy , presents to PST, with pre op diagnosis of Tracheobronchomalacia for pre op evaluation prior to scheduled surgery- Awake Bronchoscopy with Dr Stubbs.

## 2025-06-20 NOTE — DISCHARGE NOTE NURSING/CASE MANAGEMENT/SOCIAL WORK - NURSING SECTION COMPLETE
Include Z78.9 (Other Specified Conditions Influencing Health Status) As An Associated Diagnosis?: No Medical Necessity Clause: This procedure was medically necessary because the lesions that were treated were: Consent: The patient's consent was obtained including but not limited to risks of crusting, scabbing, blistering, scarring, darker or lighter pigmentary change, recurrence, incomplete removal and infection. Anesthesia Volume In Cc: 0 Treatment Number (Will Not Render If 0): 2 Detail Level: Simple Post-Care Instructions: I reviewed with the patient in detail post-care instructions. Patient is to wear sunprotection, and avoid picking at any of the treated lesions. Pt may apply Vaseline to crusted or scabbing areas. Medical Necessity Information: It is in your best interest to select a reason for this procedure from the list below. All of these items fulfill various CMS LCD requirements except the new and changing color options. Patient/Caregiver provided printed discharge information.

## 2025-07-22 ENCOUNTER — OUTPATIENT (OUTPATIENT)
Dept: OUTPATIENT SERVICES | Facility: HOSPITAL | Age: 60
LOS: 1 days | End: 2025-07-22
Payer: COMMERCIAL

## 2025-07-22 ENCOUNTER — APPOINTMENT (OUTPATIENT)
Dept: CT IMAGING | Facility: CLINIC | Age: 60
End: 2025-07-22
Payer: COMMERCIAL

## 2025-07-22 DIAGNOSIS — Z98.890 OTHER SPECIFIED POSTPROCEDURAL STATES: Chronic | ICD-10-CM

## 2025-07-22 DIAGNOSIS — Z98.84 BARIATRIC SURGERY STATUS: Chronic | ICD-10-CM

## 2025-07-22 DIAGNOSIS — Z96.641 PRESENCE OF RIGHT ARTIFICIAL HIP JOINT: Chronic | ICD-10-CM

## 2025-07-22 DIAGNOSIS — R93.89 ABNORMAL FINDINGS ON DIAGNOSTIC IMAGING OF OTHER SPECIFIED BODY STRUCTURES: ICD-10-CM

## 2025-07-22 DIAGNOSIS — Z90.49 ACQUIRED ABSENCE OF OTHER SPECIFIED PARTS OF DIGESTIVE TRACT: Chronic | ICD-10-CM

## 2025-07-22 DIAGNOSIS — Z98.1 ARTHRODESIS STATUS: Chronic | ICD-10-CM

## 2025-07-22 PROCEDURE — 71250 CT THORAX DX C-: CPT

## 2025-07-22 PROCEDURE — 71250 CT THORAX DX C-: CPT | Mod: 26

## 2025-09-18 ENCOUNTER — APPOINTMENT (OUTPATIENT)
Dept: PULMONOLOGY | Facility: CLINIC | Age: 60
End: 2025-09-18

## 2025-09-18 ENCOUNTER — NON-APPOINTMENT (OUTPATIENT)
Age: 60
End: 2025-09-18

## 2025-09-18 VITALS
DIASTOLIC BLOOD PRESSURE: 62 MMHG | BODY MASS INDEX: 23.49 KG/M2 | OXYGEN SATURATION: 97 % | HEIGHT: 68 IN | RESPIRATION RATE: 16 BRPM | SYSTOLIC BLOOD PRESSURE: 102 MMHG | WEIGHT: 155 LBS | HEART RATE: 72 BPM | TEMPERATURE: 97.3 F

## 2025-09-18 DIAGNOSIS — R93.89 ABNORMAL FINDINGS ON DIAGNOSTIC IMAGING OF OTHER SPECIFIED BODY STRUCTURES: ICD-10-CM

## 2025-09-18 DIAGNOSIS — J45.909 UNSPECIFIED ASTHMA, UNCOMPLICATED: ICD-10-CM

## 2025-09-18 DIAGNOSIS — J30.9 ALLERGIC RHINITIS, UNSPECIFIED: ICD-10-CM

## 2025-09-18 DIAGNOSIS — J98.09 OTHER DISEASES OF BRONCHUS, NOT ELSEWHERE CLASSIFIED: ICD-10-CM

## 2025-09-18 DIAGNOSIS — R06.02 SHORTNESS OF BREATH: ICD-10-CM

## 2025-09-18 DIAGNOSIS — K21.00 GASTRO-ESOPHAGEAL REFLUX DISEASE WITH ESOPHAGITIS, WITHOUT BLEEDING: ICD-10-CM

## 2025-09-18 DIAGNOSIS — J39.8 OTHER SPECIFIED DISEASES OF UPPER RESPIRATORY TRACT: ICD-10-CM

## (undated) DEVICE — GOWN LG

## (undated) DEVICE — BITE BLOCK ADULT 20 X 27MM (GREEN)

## (undated) DEVICE — LUBRICATING JELLY HR ONE SHOT 3G

## (undated) DEVICE — TUBING STRYKEFLOW II SUCTION / IRRIGATOR

## (undated) DEVICE — SOL ANTI FOG

## (undated) DEVICE — PACK ROBOTIC LIJ

## (undated) DEVICE — ELCTR GROUNDING PAD ADULT COVIDIEN

## (undated) DEVICE — TUBING SUCTION NONCONDUCTIVE 6MM X 12FT

## (undated) DEVICE — PACK IV START WITH CHG

## (undated) DEVICE — D HELP - CLEARVIEW CLEARIFY SYSTEM

## (undated) DEVICE — STERIS DEFENDO 3-PIECE KIT (AIR/WATER, SUCTION & BIOPSY VALVES)

## (undated) DEVICE — SALIVA EJECTOR (BLUE)

## (undated) DEVICE — MARKING PEN W RULER

## (undated) DEVICE — TUBING IV SET GRAVITY 3Y 100" MACRO

## (undated) DEVICE — BIOPSY FORCEP RADIAL JAW 4 STANDARD WITH NEEDLE

## (undated) DEVICE — SYR SLIP 10CC

## (undated) DEVICE — POSITIONER STRAP ARMBOARD VELCRO TS-30

## (undated) DEVICE — ENDOCATCH GENERAL 10MM (PURPLE)

## (undated) DEVICE — TROCAR COVIDIEN VERSAONE BLADELESS FIXATION 12MM STANDARD

## (undated) DEVICE — VALVE BIOPSY BRONCHOVIDEOSCOPE

## (undated) DEVICE — ENDOCATCH GENERAL 15MM (PURPLE)

## (undated) DEVICE — SUT VICRYL 0 27" UR-6

## (undated) DEVICE — XI OBTURATOR OPTICAL BLADELESS 8MM

## (undated) DEVICE — NDL HYPO REGULAR BEVEL 22G X 1.5" (TURQUOISE)

## (undated) DEVICE — VENODYNE/SCD SLEEVE CALF MEDIUM

## (undated) DEVICE — SOL IRR POUR NS 0.9% 500ML

## (undated) DEVICE — XI SEAL UNIV 5- 8 MM

## (undated) DEVICE — CONTAINER FORMALIN 80ML YELLOW

## (undated) DEVICE — GRASPER LAPA 5MMX35CM

## (undated) DEVICE — BIOPSY FORCEP COLD DISP

## (undated) DEVICE — DRSG BANDAID 0.75X3"

## (undated) DEVICE — CATH IV SAFE BC 22G X 1" (BLUE)

## (undated) DEVICE — DRSG CURITY GAUZE SPONGE 4 X 4" 12-PLY

## (undated) DEVICE — VALVE SUCTION EVIS 160/200/240

## (undated) DEVICE — SUT PROLENE 0 30" CT-1

## (undated) DEVICE — DRSG GAUZE PACKTNER ROLL

## (undated) DEVICE — ELCTR BOVIE TIP BLADE INSULATED 6.5" EDGE

## (undated) DEVICE — TUBING SUCTION 20FT

## (undated) DEVICE — SUT SILK 2-0 30" SH

## (undated) DEVICE — GOWN XL

## (undated) DEVICE — BLADE SURGICAL #10 STAINLESS

## (undated) DEVICE — BASIN EMESIS 10IN GRADUATED MAUVE

## (undated) DEVICE — XI DRAPE COLUMN

## (undated) DEVICE — TROCAR COVIDIEN VERSASTEP PLUS 15MM STANDARD

## (undated) DEVICE — DENTURE CUP PINK

## (undated) DEVICE — CHEST DRAIN OASIS DRY SUCTION WATER SEAL

## (undated) DEVICE — CLAMP BX HOT RAD JAW 3

## (undated) DEVICE — SUT VICRYL 4-0 27" PS-2 UNDYED

## (undated) DEVICE — DRSG CURITY GAUZE SPONGE 4 X 4" 12-PLY NON-STERILE

## (undated) DEVICE — CHEST DRAIN PLEUR-EVAC PNEUMENECTOMY

## (undated) DEVICE — ELCTR BOVIE TIP BLADE INSULATED 2.75" EDGE

## (undated) DEVICE — DRSG TEGADERM 4X4.75"

## (undated) DEVICE — POLY TRAP ETRAP

## (undated) DEVICE — TUBING MEDI-VAC W MAXIGRIP CONNECTORS 1/4"X6'

## (undated) DEVICE — SOL INJ LR 1000ML

## (undated) DEVICE — TUBING OLYMPUS INSUFFLATION

## (undated) DEVICE — CHEST DRAIN PLEUR-EVAC DRY/DRY ADULT-PEDS SINGLE (QUICK)

## (undated) DEVICE — ELCTR ECG CONDUCTIVE ADHESIVE

## (undated) DEVICE — TONSIL ROLLS

## (undated) DEVICE — XI ARM FORCEP FENESTRATED BIPOLAR 8MM

## (undated) DEVICE — XI ARM NEEDLE DRIVER SUTURECUT MEGA 8MM

## (undated) DEVICE — XI VESSEL SEALER

## (undated) DEVICE — SYR LUER LOK 20CC

## (undated) DEVICE — CONTAINER FORMALIN 10% 20ML

## (undated) DEVICE — UNDERPAD LINEN SAVER 17 X 24"

## (undated) DEVICE — DRSG 2X2

## (undated) DEVICE — XI DRAPE ARM

## (undated) DEVICE — XI ARM CLIP APPLIER MEDIUM-LARGE